# Patient Record
Sex: MALE | Race: WHITE | HISPANIC OR LATINO | URBAN - METROPOLITAN AREA
[De-identification: names, ages, dates, MRNs, and addresses within clinical notes are randomized per-mention and may not be internally consistent; named-entity substitution may affect disease eponyms.]

---

## 2016-07-03 RX ORDER — LABETALOL HCL 100 MG
2 TABLET ORAL
Qty: 180 | Refills: 0 | COMMUNITY
Start: 2016-07-03 | End: 2016-08-02

## 2017-01-10 ENCOUNTER — EMERGENCY (EMERGENCY)
Facility: HOSPITAL | Age: 62
LOS: 0 days | Discharge: ANOTHER HOSPITAL | End: 2017-01-10
Attending: EMERGENCY MEDICINE
Payer: MEDICAID

## 2017-01-10 ENCOUNTER — INPATIENT (INPATIENT)
Facility: HOSPITAL | Age: 62
LOS: 13 days | Discharge: HOME | DRG: 248 | End: 2017-01-24
Attending: STUDENT IN AN ORGANIZED HEALTH CARE EDUCATION/TRAINING PROGRAM | Admitting: STUDENT IN AN ORGANIZED HEALTH CARE EDUCATION/TRAINING PROGRAM
Payer: MEDICAID

## 2017-01-10 VITALS
HEIGHT: 66 IN | OXYGEN SATURATION: 97 % | SYSTOLIC BLOOD PRESSURE: 157 MMHG | RESPIRATION RATE: 18 BRPM | HEART RATE: 84 BPM | DIASTOLIC BLOOD PRESSURE: 96 MMHG | TEMPERATURE: 98 F | WEIGHT: 141.98 LBS

## 2017-01-10 VITALS
RESPIRATION RATE: 16 BRPM | TEMPERATURE: 98 F | HEIGHT: 68 IN | WEIGHT: 138.45 LBS | DIASTOLIC BLOOD PRESSURE: 88 MMHG | SYSTOLIC BLOOD PRESSURE: 156 MMHG | OXYGEN SATURATION: 99 % | HEART RATE: 82 BPM

## 2017-01-10 VITALS
HEART RATE: 78 BPM | OXYGEN SATURATION: 99 % | DIASTOLIC BLOOD PRESSURE: 103 MMHG | SYSTOLIC BLOOD PRESSURE: 171 MMHG | RESPIRATION RATE: 18 BRPM

## 2017-01-10 DIAGNOSIS — I12.0 HYPERTENSIVE CHRONIC KIDNEY DISEASE WITH STAGE 5 CHRONIC KIDNEY DISEASE OR END STAGE RENAL DISEASE: ICD-10-CM

## 2017-01-10 DIAGNOSIS — R11.10 VOMITING, UNSPECIFIED: ICD-10-CM

## 2017-01-10 DIAGNOSIS — Z79.4 LONG TERM (CURRENT) USE OF INSULIN: ICD-10-CM

## 2017-01-10 DIAGNOSIS — N18.6 END STAGE RENAL DISEASE: ICD-10-CM

## 2017-01-10 DIAGNOSIS — I21.3 ST ELEVATION (STEMI) MYOCARDIAL INFARCTION OF UNSPECIFIED SITE: ICD-10-CM

## 2017-01-10 DIAGNOSIS — E11.9 TYPE 2 DIABETES MELLITUS WITHOUT COMPLICATIONS: ICD-10-CM

## 2017-01-10 DIAGNOSIS — R10.9 UNSPECIFIED ABDOMINAL PAIN: ICD-10-CM

## 2017-01-10 DIAGNOSIS — Z99.2 DEPENDENCE ON RENAL DIALYSIS: ICD-10-CM

## 2017-01-10 DIAGNOSIS — E13.10 OTHER SPECIFIED DIABETES MELLITUS WITH KETOACIDOSIS WITHOUT COMA: ICD-10-CM

## 2017-01-10 DIAGNOSIS — N18.9 CHRONIC KIDNEY DISEASE, UNSPECIFIED: ICD-10-CM

## 2017-01-10 LAB
ACETONE SERPL-MCNC: NEGATIVE — SIGNIFICANT CHANGE UP
ALBUMIN SERPL ELPH-MCNC: 3.5 G/DL — SIGNIFICANT CHANGE UP (ref 3.3–5)
ALBUMIN SERPL ELPH-MCNC: 3.7 G/DL — SIGNIFICANT CHANGE UP (ref 3.3–5)
ALBUMIN SERPL ELPH-MCNC: 3.9 G/DL — SIGNIFICANT CHANGE UP (ref 3.3–5)
ALBUMIN SERPL ELPH-MCNC: 3.9 G/DL — SIGNIFICANT CHANGE UP (ref 3.3–5)
ALBUMIN SERPL ELPH-MCNC: 4.1 G/DL — SIGNIFICANT CHANGE UP (ref 3.3–5)
ALBUMIN SERPL ELPH-MCNC: 4.1 G/DL — SIGNIFICANT CHANGE UP (ref 3.3–5)
ALP SERPL-CCNC: 138 U/L — HIGH (ref 40–120)
ALP SERPL-CCNC: 144 U/L — HIGH (ref 40–120)
ALP SERPL-CCNC: 147 U/L — HIGH (ref 40–120)
ALP SERPL-CCNC: 153 U/L — HIGH (ref 40–120)
ALP SERPL-CCNC: 181 U/L — HIGH (ref 40–120)
ALP SERPL-CCNC: 184 U/L — HIGH (ref 40–120)
ALT FLD-CCNC: 127 U/L — HIGH (ref 12–78)
ALT FLD-CCNC: 134 U/L RC — HIGH (ref 10–45)
ALT FLD-CCNC: 138 U/L RC — HIGH (ref 10–45)
ALT FLD-CCNC: 140 U/L RC — HIGH (ref 10–45)
ALT FLD-CCNC: 142 U/L RC — HIGH (ref 10–45)
ALT FLD-CCNC: 147 U/L RC — HIGH (ref 10–45)
AMYLASE P1 CFR SERPL: 56 U/L — SIGNIFICANT CHANGE UP (ref 25–125)
AMYLASE P1 CFR SERPL: 60 U/L — SIGNIFICANT CHANGE UP (ref 25–125)
ANION GAP SERPL CALC-SCNC: 19 MMOL/L — HIGH (ref 5–17)
ANION GAP SERPL CALC-SCNC: 20 MMOL/L — HIGH (ref 5–17)
ANION GAP SERPL CALC-SCNC: 20 MMOL/L — HIGH (ref 5–17)
ANION GAP SERPL CALC-SCNC: 22 MMOL/L — HIGH (ref 5–17)
ANION GAP SERPL CALC-SCNC: 27 MMOL/L — HIGH (ref 5–17)
ANION GAP SERPL CALC-SCNC: 34 MMOL/L — HIGH (ref 5–17)
APTT BLD: 119.3 SEC — HIGH (ref 27.5–37.4)
APTT BLD: 28.1 SEC — SIGNIFICANT CHANGE UP (ref 27.5–37.4)
AST SERPL-CCNC: 172 U/L — HIGH (ref 15–37)
AST SERPL-CCNC: 232 U/L — HIGH (ref 10–40)
AST SERPL-CCNC: 404 U/L — HIGH (ref 10–40)
AST SERPL-CCNC: 413 U/L — HIGH (ref 10–40)
AST SERPL-CCNC: 447 U/L — HIGH (ref 10–40)
AST SERPL-CCNC: 475 U/L — HIGH (ref 10–40)
B-OH-BUTYR SERPL-SCNC: 6.6 MMOL/L — HIGH
BASOPHILS # BLD AUTO: 0 K/UL — SIGNIFICANT CHANGE UP (ref 0–0.2)
BASOPHILS # BLD AUTO: 0 K/UL — SIGNIFICANT CHANGE UP (ref 0–0.2)
BASOPHILS NFR BLD AUTO: 0.1 % — SIGNIFICANT CHANGE UP (ref 0–2)
BASOPHILS NFR BLD AUTO: 0.3 % — SIGNIFICANT CHANGE UP (ref 0–2)
BILIRUB SERPL-MCNC: 0.3 MG/DL — SIGNIFICANT CHANGE UP (ref 0.2–1.2)
BILIRUB SERPL-MCNC: 0.3 MG/DL — SIGNIFICANT CHANGE UP (ref 0.2–1.2)
BILIRUB SERPL-MCNC: 0.4 MG/DL — SIGNIFICANT CHANGE UP (ref 0.2–1.2)
BILIRUB SERPL-MCNC: 0.6 MG/DL — SIGNIFICANT CHANGE UP (ref 0.2–1.2)
BLD GP AB SCN SERPL QL: NEGATIVE — SIGNIFICANT CHANGE UP
BUN SERPL-MCNC: 28 MG/DL — HIGH (ref 7–23)
BUN SERPL-MCNC: 51 MG/DL — HIGH (ref 7–23)
BUN SERPL-MCNC: 54 MG/DL — HIGH (ref 7–23)
BUN SERPL-MCNC: 58 MG/DL — HIGH (ref 7–23)
BUN SERPL-MCNC: 60 MG/DL — HIGH (ref 7–23)
BUN SERPL-MCNC: 60 MG/DL — HIGH (ref 7–23)
CALCIUM SERPL-MCNC: 8.1 MG/DL — LOW (ref 8.5–10.1)
CALCIUM SERPL-MCNC: 8.8 MG/DL — SIGNIFICANT CHANGE UP (ref 8.4–10.5)
CALCIUM SERPL-MCNC: 8.8 MG/DL — SIGNIFICANT CHANGE UP (ref 8.4–10.5)
CALCIUM SERPL-MCNC: 8.9 MG/DL — SIGNIFICANT CHANGE UP (ref 8.4–10.5)
CALCIUM SERPL-MCNC: 8.9 MG/DL — SIGNIFICANT CHANGE UP (ref 8.4–10.5)
CALCIUM SERPL-MCNC: 9.1 MG/DL — SIGNIFICANT CHANGE UP (ref 8.4–10.5)
CHLORIDE SERPL-SCNC: 81 MMOL/L — LOW (ref 96–108)
CHLORIDE SERPL-SCNC: 83 MMOL/L — LOW (ref 96–108)
CHLORIDE SERPL-SCNC: 90 MMOL/L — LOW (ref 96–108)
CHLORIDE SERPL-SCNC: 91 MMOL/L — LOW (ref 96–108)
CHLORIDE SERPL-SCNC: 93 MMOL/L — LOW (ref 96–108)
CHLORIDE SERPL-SCNC: 97 MMOL/L — SIGNIFICANT CHANGE UP (ref 96–108)
CHOLEST SERPL-MCNC: 166 MG/DL — SIGNIFICANT CHANGE UP (ref 10–199)
CK MB BLD-MCNC: 5.8 % — HIGH (ref 0–3.5)
CK MB BLD-MCNC: 6.6 % — HIGH (ref 0–3.5)
CK MB BLD-MCNC: 6.8 % — HIGH (ref 0–3.5)
CK MB BLD-MCNC: 7.1 % — HIGH (ref 0–3.5)
CK MB CFR SERPL CALC: 129.6 NG/ML — HIGH (ref 0–6.7)
CK MB CFR SERPL CALC: 165.5 NG/ML — HIGH (ref 0–6.7)
CK MB CFR SERPL CALC: 18.4 NG/ML — HIGH (ref 0.5–3.6)
CK MB CFR SERPL CALC: 48.3 NG/ML — HIGH (ref 0–6.7)
CK SERPL-CCNC: 1961 U/L — HIGH (ref 30–200)
CK SERPL-CCNC: 2426 U/L — HIGH (ref 30–200)
CK SERPL-CCNC: 315 U/L — HIGH (ref 26–308)
CK SERPL-CCNC: 684 U/L — HIGH (ref 30–200)
CO2 SERPL-SCNC: 17 MMOL/L — LOW (ref 22–31)
CO2 SERPL-SCNC: 21 MMOL/L — LOW (ref 22–31)
CO2 SERPL-SCNC: 25 MMOL/L — SIGNIFICANT CHANGE UP (ref 22–31)
CO2 SERPL-SCNC: 25 MMOL/L — SIGNIFICANT CHANGE UP (ref 22–31)
CO2 SERPL-SCNC: 26 MMOL/L — SIGNIFICANT CHANGE UP (ref 22–31)
CO2 SERPL-SCNC: 26 MMOL/L — SIGNIFICANT CHANGE UP (ref 22–31)
CREAT SERPL-MCNC: 3.8 MG/DL — HIGH (ref 0.5–1.3)
CREAT SERPL-MCNC: 5.68 MG/DL — HIGH (ref 0.5–1.3)
CREAT SERPL-MCNC: 5.86 MG/DL — HIGH (ref 0.5–1.3)
CREAT SERPL-MCNC: 6.07 MG/DL — HIGH (ref 0.5–1.3)
CREAT SERPL-MCNC: 6.32 MG/DL — HIGH (ref 0.5–1.3)
CREAT SERPL-MCNC: 6.43 MG/DL — HIGH (ref 0.5–1.3)
EOSINOPHIL # BLD AUTO: 0 K/UL — SIGNIFICANT CHANGE UP (ref 0–0.5)
EOSINOPHIL # BLD AUTO: 0 K/UL — SIGNIFICANT CHANGE UP (ref 0–0.5)
EOSINOPHIL NFR BLD AUTO: 0.3 % — SIGNIFICANT CHANGE UP (ref 0–6)
EOSINOPHIL NFR BLD AUTO: 0.9 % — SIGNIFICANT CHANGE UP (ref 0–6)
GAS PNL BLDA: SIGNIFICANT CHANGE UP
GLUCOSE SERPL-MCNC: 103 MG/DL — HIGH (ref 70–99)
GLUCOSE SERPL-MCNC: 109 MG/DL — HIGH (ref 70–99)
GLUCOSE SERPL-MCNC: 183 MG/DL — HIGH (ref 70–99)
GLUCOSE SERPL-MCNC: 372 MG/DL — HIGH (ref 70–99)
GLUCOSE SERPL-MCNC: 871 MG/DL — CRITICAL HIGH (ref 70–99)
GLUCOSE SERPL-MCNC: 909 MG/DL — CRITICAL HIGH (ref 70–99)
HAV IGM SER-ACNC: SIGNIFICANT CHANGE UP
HBA1C BLD-MCNC: 12.2 % — HIGH (ref 4–5.6)
HBV CORE IGM SER-ACNC: SIGNIFICANT CHANGE UP
HBV SURFACE AG SER-ACNC: SIGNIFICANT CHANGE UP
HCOV OC43 RNA SPEC QL NAA+PROBE: DETECTED
HCT VFR BLD CALC: 35.1 % — LOW (ref 39–50)
HCT VFR BLD CALC: 36.2 % — LOW (ref 39–50)
HCV AB S/CO SERPL IA: 0.11 S/CO — SIGNIFICANT CHANGE UP
HCV AB SERPL-IMP: SIGNIFICANT CHANGE UP
HDLC SERPL-MCNC: 62 MG/DL — SIGNIFICANT CHANGE UP (ref 40–125)
HGB BLD-MCNC: 12 G/DL — LOW (ref 13–17)
HGB BLD-MCNC: 12.1 G/DL — LOW (ref 13–17)
INR BLD: 0.92 RATIO — SIGNIFICANT CHANGE UP (ref 0.88–1.16)
INR BLD: 1 RATIO — SIGNIFICANT CHANGE UP (ref 0.88–1.16)
LACTATE SERPL-SCNC: 3.8 MMOL/L — HIGH (ref 0.7–2)
LIDOCAIN IGE QN: 28 U/L — SIGNIFICANT CHANGE UP (ref 7–60)
LIPID PNL WITH DIRECT LDL SERPL: 78 MG/DL — SIGNIFICANT CHANGE UP
LYMPHOCYTES # BLD AUTO: 0.4 K/UL — LOW (ref 1–3.3)
LYMPHOCYTES # BLD AUTO: 0.4 K/UL — LOW (ref 1–3.3)
LYMPHOCYTES # BLD AUTO: 6.3 % — LOW (ref 13–44)
LYMPHOCYTES # BLD AUTO: 7.6 % — LOW (ref 13–44)
MAGNESIUM SERPL-MCNC: 2.1 MG/DL — SIGNIFICANT CHANGE UP (ref 1.6–2.6)
MAGNESIUM SERPL-MCNC: 2.4 MG/DL — SIGNIFICANT CHANGE UP (ref 1.6–2.6)
MCHC RBC-ENTMCNC: 31.7 PG — SIGNIFICANT CHANGE UP (ref 27–34)
MCHC RBC-ENTMCNC: 32.4 PG — SIGNIFICANT CHANGE UP (ref 27–34)
MCHC RBC-ENTMCNC: 33.6 GM/DL — SIGNIFICANT CHANGE UP (ref 32–36)
MCHC RBC-ENTMCNC: 34.2 GM/DL — SIGNIFICANT CHANGE UP (ref 32–36)
MCV RBC AUTO: 92.8 FL — SIGNIFICANT CHANGE UP (ref 80–100)
MCV RBC AUTO: 96.3 FL — SIGNIFICANT CHANGE UP (ref 80–100)
MONOCYTES # BLD AUTO: 0.4 K/UL — SIGNIFICANT CHANGE UP (ref 0–0.9)
MONOCYTES # BLD AUTO: 0.4 K/UL — SIGNIFICANT CHANGE UP (ref 0–0.9)
MONOCYTES NFR BLD AUTO: 5.8 % — SIGNIFICANT CHANGE UP (ref 2–14)
MONOCYTES NFR BLD AUTO: 8 % — SIGNIFICANT CHANGE UP (ref 2–14)
NEUTROPHILS # BLD AUTO: 4.3 K/UL — SIGNIFICANT CHANGE UP (ref 1.8–7.4)
NEUTROPHILS # BLD AUTO: 5.6 K/UL — SIGNIFICANT CHANGE UP (ref 1.8–7.4)
NEUTROPHILS NFR BLD AUTO: 83.3 % — HIGH (ref 43–77)
NEUTROPHILS NFR BLD AUTO: 87.3 % — HIGH (ref 43–77)
PHOSPHATE SERPL-MCNC: 6.7 MG/DL — HIGH (ref 2.5–4.5)
PLATELET # BLD AUTO: 94 K/UL — LOW (ref 150–400)
PLATELET # BLD AUTO: 99 K/UL — LOW (ref 150–400)
POTASSIUM SERPL-MCNC: 3.5 MMOL/L — SIGNIFICANT CHANGE UP (ref 3.5–5.3)
POTASSIUM SERPL-MCNC: 3.7 MMOL/L — SIGNIFICANT CHANGE UP (ref 3.5–5.3)
POTASSIUM SERPL-MCNC: 4 MMOL/L — SIGNIFICANT CHANGE UP (ref 3.5–5.3)
POTASSIUM SERPL-MCNC: 4.1 MMOL/L — SIGNIFICANT CHANGE UP (ref 3.5–5.3)
POTASSIUM SERPL-MCNC: 5 MMOL/L — SIGNIFICANT CHANGE UP (ref 3.5–5.3)
POTASSIUM SERPL-MCNC: 5.2 MMOL/L — SIGNIFICANT CHANGE UP (ref 3.5–5.3)
POTASSIUM SERPL-SCNC: 3.5 MMOL/L — SIGNIFICANT CHANGE UP (ref 3.5–5.3)
POTASSIUM SERPL-SCNC: 3.7 MMOL/L — SIGNIFICANT CHANGE UP (ref 3.5–5.3)
POTASSIUM SERPL-SCNC: 4 MMOL/L — SIGNIFICANT CHANGE UP (ref 3.5–5.3)
POTASSIUM SERPL-SCNC: 4.1 MMOL/L — SIGNIFICANT CHANGE UP (ref 3.5–5.3)
POTASSIUM SERPL-SCNC: 5 MMOL/L — SIGNIFICANT CHANGE UP (ref 3.5–5.3)
POTASSIUM SERPL-SCNC: 5.2 MMOL/L — SIGNIFICANT CHANGE UP (ref 3.5–5.3)
PROT SERPL-MCNC: 6.5 G/DL — SIGNIFICANT CHANGE UP (ref 6–8.3)
PROT SERPL-MCNC: 6.5 G/DL — SIGNIFICANT CHANGE UP (ref 6–8.3)
PROT SERPL-MCNC: 6.9 G/DL — SIGNIFICANT CHANGE UP (ref 6–8.3)
PROT SERPL-MCNC: 7.2 GM/DL — SIGNIFICANT CHANGE UP (ref 6–8.3)
PROT SERPL-MCNC: 7.3 G/DL — SIGNIFICANT CHANGE UP (ref 6–8.3)
PROT SERPL-MCNC: 7.4 G/DL — SIGNIFICANT CHANGE UP (ref 6–8.3)
PROTHROM AB SERPL-ACNC: 10.3 SEC — SIGNIFICANT CHANGE UP (ref 10–13.1)
PROTHROM AB SERPL-ACNC: 10.8 SEC — SIGNIFICANT CHANGE UP (ref 10–13.1)
RAPID RVP RESULT: DETECTED
RBC # BLD: 3.75 M/UL — LOW (ref 4.2–5.8)
RBC # BLD: 3.78 M/UL — LOW (ref 4.2–5.8)
RBC # FLD: 12 % — SIGNIFICANT CHANGE UP (ref 11–15)
RBC # FLD: 12.2 % — SIGNIFICANT CHANGE UP (ref 10.3–14.5)
RH IG SCN BLD-IMP: POSITIVE — SIGNIFICANT CHANGE UP
SODIUM SERPL-SCNC: 131 MMOL/L — LOW (ref 135–145)
SODIUM SERPL-SCNC: 132 MMOL/L — LOW (ref 135–145)
SODIUM SERPL-SCNC: 136 MMOL/L — SIGNIFICANT CHANGE UP (ref 135–145)
SODIUM SERPL-SCNC: 138 MMOL/L — SIGNIFICANT CHANGE UP (ref 135–145)
SODIUM SERPL-SCNC: 139 MMOL/L — SIGNIFICANT CHANGE UP (ref 135–145)
SODIUM SERPL-SCNC: 141 MMOL/L — SIGNIFICANT CHANGE UP (ref 135–145)
TOTAL CHOLESTEROL/HDL RATIO MEASUREMENT: 2.7 RATIO — LOW (ref 3.4–9.6)
TRIGL SERPL-MCNC: 128 MG/DL — SIGNIFICANT CHANGE UP (ref 10–149)
TROPONIN I SERPL-MCNC: 9.99 NG/ML — HIGH (ref 0.01–0.04)
TROPONIN T SERPL-MCNC: 1.66 NG/ML — HIGH (ref 0–0.06)
TROPONIN T SERPL-MCNC: 17.62 NG/ML — HIGH (ref 0–0.06)
TROPONIN T SERPL-MCNC: 22.19 NG/ML — HIGH (ref 0–0.06)
TSH SERPL-MCNC: 4.23 UU/ML — HIGH (ref 0.27–4.2)
WBC # BLD: 5.1 K/UL — SIGNIFICANT CHANGE UP (ref 3.8–10.5)
WBC # BLD: 6.4 K/UL — SIGNIFICANT CHANGE UP (ref 3.8–10.5)
WBC # FLD AUTO: 5.1 K/UL — SIGNIFICANT CHANGE UP (ref 3.8–10.5)
WBC # FLD AUTO: 6.4 K/UL — SIGNIFICANT CHANGE UP (ref 3.8–10.5)

## 2017-01-10 PROCEDURE — 93010 ELECTROCARDIOGRAM REPORT: CPT | Mod: 77,76

## 2017-01-10 PROCEDURE — 93454 CORONARY ARTERY ANGIO S&I: CPT | Mod: 26,59

## 2017-01-10 PROCEDURE — 92928 PRQ TCAT PLMT NTRAC ST 1 LES: CPT | Mod: LD

## 2017-01-10 PROCEDURE — 93010 ELECTROCARDIOGRAM REPORT: CPT

## 2017-01-10 PROCEDURE — 76705 ECHO EXAM OF ABDOMEN: CPT | Mod: 26,RT

## 2017-01-10 PROCEDURE — 99223 1ST HOSP IP/OBS HIGH 75: CPT | Mod: GC

## 2017-01-10 PROCEDURE — 93306 TTE W/DOPPLER COMPLETE: CPT | Mod: 26

## 2017-01-10 PROCEDURE — 71010: CPT | Mod: 26

## 2017-01-10 PROCEDURE — 99291 CRITICAL CARE FIRST HOUR: CPT

## 2017-01-10 PROCEDURE — 93010 ELECTROCARDIOGRAM REPORT: CPT | Mod: 77

## 2017-01-10 RX ORDER — INSULIN HUMAN 100 [IU]/ML
9 INJECTION, SOLUTION SUBCUTANEOUS
Qty: 100 | Refills: 0 | Status: DISCONTINUED | OUTPATIENT
Start: 2017-01-10 | End: 2017-01-10

## 2017-01-10 RX ORDER — HEPARIN SODIUM 5000 [USP'U]/ML
3800 INJECTION INTRAVENOUS; SUBCUTANEOUS EVERY 6 HOURS
Qty: 0 | Refills: 0 | Status: DISCONTINUED | OUTPATIENT
Start: 2017-01-10 | End: 2017-01-10

## 2017-01-10 RX ORDER — TICAGRELOR 90 MG/1
180 TABLET ORAL ONCE
Qty: 0 | Refills: 0 | Status: COMPLETED | OUTPATIENT
Start: 2017-01-10 | End: 2017-01-10

## 2017-01-10 RX ORDER — PANTOPRAZOLE SODIUM 20 MG/1
80 TABLET, DELAYED RELEASE ORAL ONCE
Qty: 0 | Refills: 0 | Status: COMPLETED | OUTPATIENT
Start: 2017-01-10 | End: 2017-01-10

## 2017-01-10 RX ORDER — HEPARIN SODIUM 5000 [USP'U]/ML
INJECTION INTRAVENOUS; SUBCUTANEOUS
Qty: 25000 | Refills: 0 | Status: DISCONTINUED | OUTPATIENT
Start: 2017-01-10 | End: 2017-01-10

## 2017-01-10 RX ORDER — PANTOPRAZOLE SODIUM 20 MG/1
8 TABLET, DELAYED RELEASE ORAL
Qty: 80 | Refills: 0 | Status: DISCONTINUED | OUTPATIENT
Start: 2017-01-10 | End: 2017-01-10

## 2017-01-10 RX ORDER — HEPARIN SODIUM 5000 [USP'U]/ML
3800 INJECTION INTRAVENOUS; SUBCUTANEOUS ONCE
Qty: 0 | Refills: 0 | Status: COMPLETED | OUTPATIENT
Start: 2017-01-10 | End: 2017-01-10

## 2017-01-10 RX ORDER — TICAGRELOR 90 MG/1
90 TABLET ORAL
Qty: 0 | Refills: 0 | Status: DISCONTINUED | OUTPATIENT
Start: 2017-01-11 | End: 2017-01-13

## 2017-01-10 RX ORDER — INSULIN HUMAN 100 [IU]/ML
7 INJECTION, SOLUTION SUBCUTANEOUS
Qty: 100 | Refills: 0 | Status: DISCONTINUED | OUTPATIENT
Start: 2017-01-10 | End: 2017-01-10

## 2017-01-10 RX ORDER — HEPARIN SODIUM 5000 [USP'U]/ML
5000 INJECTION INTRAVENOUS; SUBCUTANEOUS EVERY 12 HOURS
Qty: 0 | Refills: 0 | Status: DISCONTINUED | OUTPATIENT
Start: 2017-01-10 | End: 2017-01-10

## 2017-01-10 RX ORDER — SODIUM CHLORIDE 9 MG/ML
250 INJECTION INTRAMUSCULAR; INTRAVENOUS; SUBCUTANEOUS ONCE
Qty: 0 | Refills: 0 | Status: COMPLETED | OUTPATIENT
Start: 2017-01-10 | End: 2017-01-10

## 2017-01-10 RX ORDER — INSULIN HUMAN 100 [IU]/ML
7 INJECTION, SOLUTION SUBCUTANEOUS ONCE
Qty: 0 | Refills: 0 | Status: COMPLETED | OUTPATIENT
Start: 2017-01-10 | End: 2017-01-10

## 2017-01-10 RX ORDER — ONDANSETRON 8 MG/1
8 TABLET, FILM COATED ORAL ONCE
Qty: 0 | Refills: 0 | Status: COMPLETED | OUTPATIENT
Start: 2017-01-10 | End: 2017-01-10

## 2017-01-10 RX ORDER — CLOPIDOGREL BISULFATE 75 MG/1
300 TABLET, FILM COATED ORAL ONCE
Qty: 0 | Refills: 0 | Status: COMPLETED | OUTPATIENT
Start: 2017-01-10 | End: 2017-01-10

## 2017-01-10 RX ORDER — ASPIRIN/CALCIUM CARB/MAGNESIUM 324 MG
81 TABLET ORAL DAILY
Qty: 0 | Refills: 0 | Status: DISCONTINUED | OUTPATIENT
Start: 2017-01-10 | End: 2017-01-24

## 2017-01-10 RX ORDER — INSULIN HUMAN 100 [IU]/ML
6 INJECTION, SOLUTION SUBCUTANEOUS
Qty: 100 | Refills: 0 | Status: DISCONTINUED | OUTPATIENT
Start: 2017-01-10 | End: 2017-01-10

## 2017-01-10 RX ORDER — PANTOPRAZOLE SODIUM 20 MG/1
40 TABLET, DELAYED RELEASE ORAL ONCE
Qty: 0 | Refills: 0 | Status: COMPLETED | OUTPATIENT
Start: 2017-01-10 | End: 2017-01-10

## 2017-01-10 RX ORDER — CLOPIDOGREL BISULFATE 75 MG/1
75 TABLET, FILM COATED ORAL DAILY
Qty: 0 | Refills: 0 | Status: DISCONTINUED | OUTPATIENT
Start: 2017-01-10 | End: 2017-01-10

## 2017-01-10 RX ORDER — SODIUM CHLORIDE 9 MG/ML
1000 INJECTION INTRAMUSCULAR; INTRAVENOUS; SUBCUTANEOUS ONCE
Qty: 0 | Refills: 0 | Status: COMPLETED | OUTPATIENT
Start: 2017-01-10 | End: 2017-01-10

## 2017-01-10 RX ORDER — HEPARIN SODIUM 5000 [USP'U]/ML
550 INJECTION INTRAVENOUS; SUBCUTANEOUS
Qty: 25000 | Refills: 0 | Status: DISCONTINUED | OUTPATIENT
Start: 2017-01-10 | End: 2017-01-10

## 2017-01-10 RX ORDER — DEXTROSE 50 % IN WATER 50 %
50 SYRINGE (ML) INTRAVENOUS ONCE
Qty: 0 | Refills: 0 | Status: COMPLETED | OUTPATIENT
Start: 2017-01-10 | End: 2017-01-10

## 2017-01-10 RX ORDER — INSULIN HUMAN 100 [IU]/ML
9 INJECTION, SOLUTION SUBCUTANEOUS ONCE
Qty: 0 | Refills: 0 | Status: COMPLETED | OUTPATIENT
Start: 2017-01-10 | End: 2017-01-10

## 2017-01-10 RX ORDER — HYDRALAZINE HCL 50 MG
10 TABLET ORAL ONCE
Qty: 0 | Refills: 0 | Status: COMPLETED | OUTPATIENT
Start: 2017-01-10 | End: 2017-01-10

## 2017-01-10 RX ORDER — ASPIRIN/CALCIUM CARB/MAGNESIUM 324 MG
325 TABLET ORAL ONCE
Qty: 0 | Refills: 0 | Status: COMPLETED | OUTPATIENT
Start: 2017-01-10 | End: 2017-01-10

## 2017-01-10 RX ORDER — ATORVASTATIN CALCIUM 80 MG/1
80 TABLET, FILM COATED ORAL AT BEDTIME
Qty: 0 | Refills: 0 | Status: DISCONTINUED | OUTPATIENT
Start: 2017-01-10 | End: 2017-01-10

## 2017-01-10 RX ORDER — SODIUM CHLORIDE 9 MG/ML
1000 INJECTION, SOLUTION INTRAVENOUS
Qty: 0 | Refills: 0 | Status: DISCONTINUED | OUTPATIENT
Start: 2017-01-10 | End: 2017-01-10

## 2017-01-10 RX ADMIN — SODIUM CHLORIDE 250 MILLILITER(S): 9 INJECTION INTRAMUSCULAR; INTRAVENOUS; SUBCUTANEOUS at 06:00

## 2017-01-10 RX ADMIN — Medication 10 MILLIGRAM(S): at 07:33

## 2017-01-10 RX ADMIN — SODIUM CHLORIDE 75 MILLILITER(S): 9 INJECTION, SOLUTION INTRAVENOUS at 13:54

## 2017-01-10 RX ADMIN — PANTOPRAZOLE SODIUM 40 MILLIGRAM(S): 20 TABLET, DELAYED RELEASE ORAL at 09:23

## 2017-01-10 RX ADMIN — HEPARIN SODIUM 3800 UNIT(S): 5000 INJECTION INTRAVENOUS; SUBCUTANEOUS at 03:21

## 2017-01-10 RX ADMIN — SODIUM CHLORIDE 2000 MILLILITER(S): 9 INJECTION INTRAMUSCULAR; INTRAVENOUS; SUBCUTANEOUS at 02:56

## 2017-01-10 RX ADMIN — INSULIN HUMAN 9 UNIT(S): 100 INJECTION, SOLUTION SUBCUTANEOUS at 05:45

## 2017-01-10 RX ADMIN — ONDANSETRON 8 MILLIGRAM(S): 8 TABLET, FILM COATED ORAL at 02:44

## 2017-01-10 RX ADMIN — TICAGRELOR 180 MILLIGRAM(S): 90 TABLET ORAL at 09:53

## 2017-01-10 RX ADMIN — PANTOPRAZOLE SODIUM 10 MG/HR: 20 TABLET, DELAYED RELEASE ORAL at 03:13

## 2017-01-10 RX ADMIN — INSULIN HUMAN 9 UNIT(S)/HR: 100 INJECTION, SOLUTION SUBCUTANEOUS at 05:50

## 2017-01-10 RX ADMIN — CLOPIDOGREL BISULFATE 300 MILLIGRAM(S): 75 TABLET, FILM COATED ORAL at 03:12

## 2017-01-10 RX ADMIN — PANTOPRAZOLE SODIUM 80 MILLIGRAM(S): 20 TABLET, DELAYED RELEASE ORAL at 03:13

## 2017-01-10 RX ADMIN — Medication 325 MILLIGRAM(S): at 02:44

## 2017-01-10 RX ADMIN — HEPARIN SODIUM 550 UNIT(S)/HR: 5000 INJECTION INTRAVENOUS; SUBCUTANEOUS at 05:59

## 2017-01-10 RX ADMIN — INSULIN HUMAN 7 UNIT(S): 100 INJECTION, SOLUTION SUBCUTANEOUS at 03:20

## 2017-01-10 RX ADMIN — Medication 50 MILLILITER(S): at 15:14

## 2017-01-10 RX ADMIN — CLOPIDOGREL BISULFATE 300 MILLIGRAM(S): 75 TABLET, FILM COATED ORAL at 02:43

## 2017-01-10 NOTE — H&P ADULT. - HISTORY OF PRESENT ILLNESS
61 M pmh HTN, DM2, ESRD on HD m/w/f (last HD last night) presents as a transfer from  due to STEMI.  Pt has had "flu-like" symptoms - n/v, anorexia, fever/chills, diaphoresis for past 3 days and went to ED at  last night.  Pt denied chest pain.  Pt was found to have TOBIAS in inferior leads with reciprocal changes.  OSH found pt's blood sugar to be 909.   gave started asa/plavix/hep gtt and protonix gtt.    On arrival patient appears uncomfortable.  Pt states chest pain in xyphoid region with associated shortness of breath, nausea.  Pt denies abdominal pain, palpitations.  Pt is nonsmoker, with previous alcohol use.  Pt lives with wife and family. 61 M pmh HTN, DM2, ESRD on HD m/w/f (last HD last night) presents as a transfer from  due to STEMI.  Pt has had "flu-like" symptoms - n/v, anorexia, fever/chills, diaphoresis, shortness of breath for past 3 days and went to ED at  last night.  Pt denied chest pain.  Pt was found to have TOBIAS in inferior leads with reciprocal changes.   found pt's blood sugar to be 909.   gave started asa/plavix/hep gtt and protonix gtt.    On arrival patient appears uncomfortable.  Pt states chest pain in xyphoid region with associated shortness of breath, nausea.  Pt denies abdominal pain, palpitations.  Pt is nonsmoker, with previous alcohol use.  Pt lives with wife and family.    Pt had prior cath showing: Moderate to severe prox LAD disease. Multiple small  vessels with severe disease - too small for PCI (OM1, OM2, D1, D2) 61 M pmh HTN, DM2, ESRD on HD m/w/f (last HD last night) presents as a transfer from  due to STEMI.  Pt has had "flu-like" symptoms - n/v, anorexia, fever/chills, diaphoresis, shortness of breath for past 3 days and went to ED at  last night.  Pt denied chest pain, butwas found to have TOBIAS in inferior leads with reciprocal changes.   found pt's blood sugar to be 909.   gave started asa/plavix/hep gtt and protonix gtt.    On arrival patient appears uncomfortable.  Pt states chest pain in xyphoid region with associated shortness of breath, nausea.  Pt denies abdominal pain, palpitations.  Pt is nonsmoker, with previous alcohol use.  Pt lives with wife and family.    Pt had prior cath (6/2016) showing: moderate to severe prox LAD disease. Multiple small  vessels with severe disease - too small for PCI (OM1, OM2, D1, D2) 61 M pmh HTN, DM2, ESRD on HD m/w/f (last HD last night) presents as a transfer from  due to STEMI.  Pt has had "flu-like" symptoms - n/v, anorexia, fever/chills, diaphoresis for past 3 days and went to ED at  last night.  Pt denied chest pain, shortness of breath, but was found to have TOBIAS in inferior leads with reciprocal changes.   found pt's blood sugar to be 909.   gave started asa/plavix/hep gtt and protonix gtt.    On arrival patient appears uncomfortable and lethargic.  Pt states chest/abd pain in epigastric region with associated nausea.  Pt denies palpitations, chest pain, shortness of breath, HA.  Pt is nonsmoker, with previous alcohol use.  Pt lives with wife and family.    Pt had prior cath (6/2016) showing: moderate to severe prox LAD disease. Multiple small  vessels with severe disease - too small for PCI (OM1, OM2, D1, D2)

## 2017-01-10 NOTE — ED PROVIDER NOTE - MEDICAL DECISION MAKING DETAILS
Pt w STEMI, given ASA, plavix, heparin.  Transfer arranged, pt going to CCU bed 3, Dr. Mcdonough accepting. Pt w STEMI, given ASA, plavix, heparin.  Given insulin for DKA.  Transfer arranged, pt going to CCU bed 3, Dr. Mcdonough accepting.

## 2017-01-10 NOTE — ED PROVIDER NOTE - CRITICAL CARE PROVIDED
interpretation of diagnostic studies/direct patient care (not related to procedure)/consult w/ pt's family directly relating to pts condition/additional history taking/consultation with other physicians/documentation

## 2017-01-10 NOTE — ED PROVIDER NOTE - OBJECTIVE STATEMENT
Pertinent PMH/PSH/FHx/SHx and Review of Systems contained within:    60yo M w PMH of HTN, HL, ESRD on HD presents to ED c/o vomiting.  Pt states he has not felt well,   for about 2d, then after dialysis at 3pm had mult episodes of vomiting.  P Pertinent PMH/PSH/FHx/SHx and Review of Systems contained within:    60yo M w PMH of HTN, HL, ESRD on HD presents to ED c/o vomiting.  Pt states he has not felt well, for about 2d, then after dialysis at 3pm had mult episodes of vomiting.  Denies fever, chills, CP, SOB, ingestion of contaminated foods.    No fever/chills, No photophobia/eye pain/changes in vision, No ear pain/sore throat/dysphagia, No chest pain/palpitations, no SOB/cough/wheeze/stridor, no dysuria/frequency/discharge, No neck/back pain, no rash, no changes in neurological status/function.

## 2017-01-10 NOTE — ED PROVIDER NOTE - PHYSICAL EXAMINATION
Gen: Awake, c/o discomfort;  Head: NC, AT, EOMI, normal lids/conjunctiva;  ENT: normal hearing, patent oropharynx, MMM;  Neck: supple, no tenderness/meningismus, FROM;  Pulm: Bilateral clear BS, normal resp effort, no wheeze/stridor/retractions;  CV: RRR, no M/R/G, +dist pulses;  Abd: soft, no focal TTP, ND, +BS, no guarding/rebound tenderness;  Mskel: no edema/erythema/cyanosis;  Skin: no rash;  Neuro: AAOx3, no sensory/motor deficits

## 2017-01-10 NOTE — H&P ADULT. - RS GEN PE MLT RESP DETAILS PC
no rhonchi/airway patent/no chest wall tenderness/no wheezes/rales/good air movement/breath sounds equal

## 2017-01-10 NOTE — H&P ADULT. - ASSESSMENT
61 M pmh HTN, DM2, ESRD on HD m/w/f presents as a transfer from  due to STEMI.  Pt also hyperglycemic.

## 2017-01-10 NOTE — ED PROVIDER NOTE - CARE PLAN
Principal Discharge DX:	STEMI (ST elevation myocardial infarction) Principal Discharge DX:	STEMI (ST elevation myocardial infarction)  Secondary Diagnosis:	DKA (diabetic ketoacidoses)

## 2017-01-11 LAB
ACETONE SERPL-MCNC: SIGNIFICANT CHANGE UP
ALBUMIN SERPL ELPH-MCNC: 3.8 G/DL — SIGNIFICANT CHANGE UP (ref 3.3–5)
ALBUMIN SERPL ELPH-MCNC: 3.9 G/DL — SIGNIFICANT CHANGE UP (ref 3.3–5)
ALBUMIN SERPL ELPH-MCNC: 4 G/DL — SIGNIFICANT CHANGE UP (ref 3.3–5)
ALP SERPL-CCNC: 131 U/L — HIGH (ref 40–120)
ALP SERPL-CCNC: 135 U/L — HIGH (ref 40–120)
ALP SERPL-CCNC: 138 U/L — HIGH (ref 40–120)
ALT FLD-CCNC: 125 U/L RC — HIGH (ref 10–45)
ALT FLD-CCNC: 128 U/L RC — HIGH (ref 10–45)
ALT FLD-CCNC: 133 U/L RC — HIGH (ref 10–45)
AMYLASE P1 CFR SERPL: 57 U/L — SIGNIFICANT CHANGE UP (ref 25–125)
ANION GAP SERPL CALC-SCNC: 17 MMOL/L — SIGNIFICANT CHANGE UP (ref 5–17)
ANION GAP SERPL CALC-SCNC: 17 MMOL/L — SIGNIFICANT CHANGE UP (ref 5–17)
ANION GAP SERPL CALC-SCNC: 21 MMOL/L — HIGH (ref 5–17)
APTT BLD: 26.1 SEC — LOW (ref 27.5–37.4)
AST SERPL-CCNC: 215 U/L — HIGH (ref 10–40)
AST SERPL-CCNC: 282 U/L — HIGH (ref 10–40)
AST SERPL-CCNC: 323 U/L — HIGH (ref 10–40)
BASOPHILS # BLD AUTO: 0 K/UL — SIGNIFICANT CHANGE UP (ref 0–0.2)
BASOPHILS # BLD AUTO: 0 K/UL — SIGNIFICANT CHANGE UP (ref 0–0.2)
BASOPHILS NFR BLD AUTO: 0 % — SIGNIFICANT CHANGE UP (ref 0–2)
BASOPHILS NFR BLD AUTO: 0.1 % — SIGNIFICANT CHANGE UP (ref 0–2)
BILIRUB SERPL-MCNC: 0.3 MG/DL — SIGNIFICANT CHANGE UP (ref 0.2–1.2)
BILIRUB SERPL-MCNC: 0.4 MG/DL — SIGNIFICANT CHANGE UP (ref 0.2–1.2)
BILIRUB SERPL-MCNC: 0.4 MG/DL — SIGNIFICANT CHANGE UP (ref 0.2–1.2)
BUN SERPL-MCNC: 33 MG/DL — HIGH (ref 7–23)
BUN SERPL-MCNC: 37 MG/DL — HIGH (ref 7–23)
BUN SERPL-MCNC: 43 MG/DL — HIGH (ref 7–23)
CALCIUM SERPL-MCNC: 9 MG/DL — SIGNIFICANT CHANGE UP (ref 8.4–10.5)
CALCIUM SERPL-MCNC: 9.3 MG/DL — SIGNIFICANT CHANGE UP (ref 8.4–10.5)
CALCIUM SERPL-MCNC: 9.3 MG/DL — SIGNIFICANT CHANGE UP (ref 8.4–10.5)
CHLORIDE SERPL-SCNC: 92 MMOL/L — LOW (ref 96–108)
CHLORIDE SERPL-SCNC: 94 MMOL/L — LOW (ref 96–108)
CHLORIDE SERPL-SCNC: 97 MMOL/L — SIGNIFICANT CHANGE UP (ref 96–108)
CK MB BLD-MCNC: 5.3 % — HIGH (ref 0–3.5)
CK MB BLD-MCNC: 5.8 % — HIGH (ref 0–3.5)
CK MB CFR SERPL CALC: 65.4 NG/ML — HIGH (ref 0–6.7)
CK MB CFR SERPL CALC: 88.2 NG/ML — HIGH (ref 0–6.7)
CK SERPL-CCNC: 1234 U/L — HIGH (ref 30–200)
CK SERPL-CCNC: 1512 U/L — HIGH (ref 30–200)
CO2 SERPL-SCNC: 22 MMOL/L — SIGNIFICANT CHANGE UP (ref 22–31)
CO2 SERPL-SCNC: 25 MMOL/L — SIGNIFICANT CHANGE UP (ref 22–31)
CO2 SERPL-SCNC: 26 MMOL/L — SIGNIFICANT CHANGE UP (ref 22–31)
CREAT SERPL-MCNC: 4.7 MG/DL — HIGH (ref 0.5–1.3)
CREAT SERPL-MCNC: 5.34 MG/DL — HIGH (ref 0.5–1.3)
CREAT SERPL-MCNC: 6.09 MG/DL — HIGH (ref 0.5–1.3)
EOSINOPHIL # BLD AUTO: 0.1 K/UL — SIGNIFICANT CHANGE UP (ref 0–0.5)
EOSINOPHIL # BLD AUTO: 0.1 K/UL — SIGNIFICANT CHANGE UP (ref 0–0.5)
EOSINOPHIL NFR BLD AUTO: 0.5 % — SIGNIFICANT CHANGE UP (ref 0–6)
EOSINOPHIL NFR BLD AUTO: 0.9 % — SIGNIFICANT CHANGE UP (ref 0–6)
GAS PNL BLDA: SIGNIFICANT CHANGE UP
GLUCOSE SERPL-MCNC: 150 MG/DL — HIGH (ref 70–99)
GLUCOSE SERPL-MCNC: 269 MG/DL — HIGH (ref 70–99)
GLUCOSE SERPL-MCNC: 307 MG/DL — HIGH (ref 70–99)
HCT VFR BLD CALC: 34.5 % — LOW (ref 39–50)
HCT VFR BLD CALC: 35.4 % — LOW (ref 39–50)
HGB BLD-MCNC: 12.1 G/DL — LOW (ref 13–17)
HGB BLD-MCNC: 12.1 G/DL — LOW (ref 13–17)
INR BLD: 0.95 RATIO — SIGNIFICANT CHANGE UP (ref 0.88–1.16)
LYMPHOCYTES # BLD AUTO: 0.7 K/UL — LOW (ref 1–3.3)
LYMPHOCYTES # BLD AUTO: 1.1 K/UL — SIGNIFICANT CHANGE UP (ref 1–3.3)
LYMPHOCYTES # BLD AUTO: 5.9 % — LOW (ref 13–44)
LYMPHOCYTES # BLD AUTO: 8.8 % — LOW (ref 13–44)
MAGNESIUM SERPL-MCNC: 2 MG/DL — SIGNIFICANT CHANGE UP (ref 1.6–2.6)
MAGNESIUM SERPL-MCNC: 2.2 MG/DL — SIGNIFICANT CHANGE UP (ref 1.6–2.6)
MCHC RBC-ENTMCNC: 31.9 PG — SIGNIFICANT CHANGE UP (ref 27–34)
MCHC RBC-ENTMCNC: 32.2 PG — SIGNIFICANT CHANGE UP (ref 27–34)
MCHC RBC-ENTMCNC: 34.3 GM/DL — SIGNIFICANT CHANGE UP (ref 32–36)
MCHC RBC-ENTMCNC: 35 GM/DL — SIGNIFICANT CHANGE UP (ref 32–36)
MCV RBC AUTO: 92 FL — SIGNIFICANT CHANGE UP (ref 80–100)
MCV RBC AUTO: 92.9 FL — SIGNIFICANT CHANGE UP (ref 80–100)
MONOCYTES # BLD AUTO: 1.1 K/UL — HIGH (ref 0–0.9)
MONOCYTES # BLD AUTO: 1.4 K/UL — HIGH (ref 0–0.9)
MONOCYTES NFR BLD AUTO: 11.2 % — SIGNIFICANT CHANGE UP (ref 2–14)
MONOCYTES NFR BLD AUTO: 9.3 % — SIGNIFICANT CHANGE UP (ref 2–14)
NEUTROPHILS # BLD AUTO: 9.6 K/UL — HIGH (ref 1.8–7.4)
NEUTROPHILS # BLD AUTO: 9.9 K/UL — HIGH (ref 1.8–7.4)
NEUTROPHILS NFR BLD AUTO: 78.9 % — HIGH (ref 43–77)
NEUTROPHILS NFR BLD AUTO: 84.2 % — HIGH (ref 43–77)
PHOSPHATE SERPL-MCNC: 3.8 MG/DL — SIGNIFICANT CHANGE UP (ref 2.5–4.5)
PHOSPHATE SERPL-MCNC: 4.3 MG/DL — SIGNIFICANT CHANGE UP (ref 2.5–4.5)
PLATELET # BLD AUTO: 109 K/UL — LOW (ref 150–400)
PLATELET # BLD AUTO: 123 K/UL — LOW (ref 150–400)
POTASSIUM SERPL-MCNC: 4.4 MMOL/L — SIGNIFICANT CHANGE UP (ref 3.5–5.3)
POTASSIUM SERPL-MCNC: 4.5 MMOL/L — SIGNIFICANT CHANGE UP (ref 3.5–5.3)
POTASSIUM SERPL-MCNC: 4.6 MMOL/L — SIGNIFICANT CHANGE UP (ref 3.5–5.3)
POTASSIUM SERPL-SCNC: 4.4 MMOL/L — SIGNIFICANT CHANGE UP (ref 3.5–5.3)
POTASSIUM SERPL-SCNC: 4.5 MMOL/L — SIGNIFICANT CHANGE UP (ref 3.5–5.3)
POTASSIUM SERPL-SCNC: 4.6 MMOL/L — SIGNIFICANT CHANGE UP (ref 3.5–5.3)
PROT SERPL-MCNC: 7 G/DL — SIGNIFICANT CHANGE UP (ref 6–8.3)
PROTHROM AB SERPL-ACNC: 10.3 SEC — SIGNIFICANT CHANGE UP (ref 10–13.1)
RBC # BLD: 3.75 M/UL — LOW (ref 4.2–5.8)
RBC # BLD: 3.81 M/UL — LOW (ref 4.2–5.8)
RBC # FLD: 11.9 % — SIGNIFICANT CHANGE UP (ref 10.3–14.5)
RBC # FLD: 12.3 % — SIGNIFICANT CHANGE UP (ref 10.3–14.5)
SODIUM SERPL-SCNC: 135 MMOL/L — SIGNIFICANT CHANGE UP (ref 135–145)
SODIUM SERPL-SCNC: 137 MMOL/L — SIGNIFICANT CHANGE UP (ref 135–145)
SODIUM SERPL-SCNC: 139 MMOL/L — SIGNIFICANT CHANGE UP (ref 135–145)
T3 SERPL-MCNC: 80 NG/DL — SIGNIFICANT CHANGE UP (ref 80–200)
T4 FREE SERPL-MCNC: 1.6 NG/DL — SIGNIFICANT CHANGE UP (ref 0.9–1.8)
TROPONIN T SERPL-MCNC: 16.46 NG/ML — HIGH (ref 0–0.06)
TROPONIN T SERPL-MCNC: 18.2 NG/ML — HIGH (ref 0–0.06)
WBC # BLD: 11.4 K/UL — HIGH (ref 3.8–10.5)
WBC # BLD: 12.6 K/UL — HIGH (ref 3.8–10.5)
WBC # FLD AUTO: 11.4 K/UL — HIGH (ref 3.8–10.5)
WBC # FLD AUTO: 12.6 K/UL — HIGH (ref 3.8–10.5)

## 2017-01-11 PROCEDURE — 99223 1ST HOSP IP/OBS HIGH 75: CPT | Mod: GC

## 2017-01-11 PROCEDURE — 99233 SBSQ HOSP IP/OBS HIGH 50: CPT | Mod: GC

## 2017-01-11 PROCEDURE — 93010 ELECTROCARDIOGRAM REPORT: CPT

## 2017-01-11 PROCEDURE — 99223 1ST HOSP IP/OBS HIGH 75: CPT

## 2017-01-11 RX ORDER — INSULIN LISPRO 100/ML
4 VIAL (ML) SUBCUTANEOUS
Qty: 0 | Refills: 0 | Status: DISCONTINUED | OUTPATIENT
Start: 2017-01-11 | End: 2017-01-13

## 2017-01-11 RX ORDER — INSULIN GLARGINE 100 [IU]/ML
6 INJECTION, SOLUTION SUBCUTANEOUS EVERY MORNING
Qty: 0 | Refills: 0 | Status: DISCONTINUED | OUTPATIENT
Start: 2017-01-11 | End: 2017-01-11

## 2017-01-11 RX ORDER — HEPARIN SODIUM 5000 [USP'U]/ML
5000 INJECTION INTRAVENOUS; SUBCUTANEOUS EVERY 12 HOURS
Qty: 0 | Refills: 0 | Status: DISCONTINUED | OUTPATIENT
Start: 2017-01-11 | End: 2017-01-24

## 2017-01-11 RX ORDER — DEXTROSE 50 % IN WATER 50 %
12.5 SYRINGE (ML) INTRAVENOUS ONCE
Qty: 0 | Refills: 0 | Status: DISCONTINUED | OUTPATIENT
Start: 2017-01-11 | End: 2017-01-24

## 2017-01-11 RX ORDER — ATORVASTATIN CALCIUM 80 MG/1
40 TABLET, FILM COATED ORAL AT BEDTIME
Qty: 0 | Refills: 0 | Status: DISCONTINUED | OUTPATIENT
Start: 2017-01-11 | End: 2017-01-24

## 2017-01-11 RX ORDER — INSULIN LISPRO 100/ML
VIAL (ML) SUBCUTANEOUS AT BEDTIME
Qty: 0 | Refills: 0 | Status: DISCONTINUED | OUTPATIENT
Start: 2017-01-11 | End: 2017-01-11

## 2017-01-11 RX ORDER — CARVEDILOL PHOSPHATE 80 MG/1
6.25 CAPSULE, EXTENDED RELEASE ORAL EVERY 12 HOURS
Qty: 0 | Refills: 0 | Status: DISCONTINUED | OUTPATIENT
Start: 2017-01-11 | End: 2017-01-11

## 2017-01-11 RX ORDER — DEXTROSE 50 % IN WATER 50 %
25 SYRINGE (ML) INTRAVENOUS ONCE
Qty: 0 | Refills: 0 | Status: DISCONTINUED | OUTPATIENT
Start: 2017-01-11 | End: 2017-01-11

## 2017-01-11 RX ORDER — INSULIN LISPRO 100/ML
VIAL (ML) SUBCUTANEOUS EVERY 4 HOURS
Qty: 0 | Refills: 0 | Status: DISCONTINUED | OUTPATIENT
Start: 2017-01-11 | End: 2017-01-11

## 2017-01-11 RX ORDER — DEXTROSE 50 % IN WATER 50 %
1 SYRINGE (ML) INTRAVENOUS ONCE
Qty: 0 | Refills: 0 | Status: DISCONTINUED | OUTPATIENT
Start: 2017-01-11 | End: 2017-01-24

## 2017-01-11 RX ORDER — SODIUM CHLORIDE 9 MG/ML
1000 INJECTION, SOLUTION INTRAVENOUS
Qty: 0 | Refills: 0 | Status: DISCONTINUED | OUTPATIENT
Start: 2017-01-11 | End: 2017-01-11

## 2017-01-11 RX ORDER — GLUCAGON INJECTION, SOLUTION 0.5 MG/.1ML
1 INJECTION, SOLUTION SUBCUTANEOUS ONCE
Qty: 0 | Refills: 0 | Status: DISCONTINUED | OUTPATIENT
Start: 2017-01-11 | End: 2017-01-24

## 2017-01-11 RX ORDER — LOSARTAN POTASSIUM 100 MG/1
50 TABLET, FILM COATED ORAL DAILY
Qty: 0 | Refills: 0 | Status: DISCONTINUED | OUTPATIENT
Start: 2017-01-11 | End: 2017-01-11

## 2017-01-11 RX ORDER — INSULIN LISPRO 100/ML
VIAL (ML) SUBCUTANEOUS AT BEDTIME
Qty: 0 | Refills: 0 | Status: DISCONTINUED | OUTPATIENT
Start: 2017-01-11 | End: 2017-01-19

## 2017-01-11 RX ORDER — DEXTROSE 50 % IN WATER 50 %
1 SYRINGE (ML) INTRAVENOUS ONCE
Qty: 0 | Refills: 0 | Status: DISCONTINUED | OUTPATIENT
Start: 2017-01-11 | End: 2017-01-11

## 2017-01-11 RX ORDER — DEXTROSE 50 % IN WATER 50 %
25 SYRINGE (ML) INTRAVENOUS ONCE
Qty: 0 | Refills: 0 | Status: DISCONTINUED | OUTPATIENT
Start: 2017-01-11 | End: 2017-01-24

## 2017-01-11 RX ORDER — CARVEDILOL PHOSPHATE 80 MG/1
6.25 CAPSULE, EXTENDED RELEASE ORAL EVERY 12 HOURS
Qty: 0 | Refills: 0 | Status: DISCONTINUED | OUTPATIENT
Start: 2017-01-11 | End: 2017-01-24

## 2017-01-11 RX ORDER — INSULIN LISPRO 100/ML
2 VIAL (ML) SUBCUTANEOUS ONCE
Qty: 0 | Refills: 0 | Status: COMPLETED | OUTPATIENT
Start: 2017-01-11 | End: 2017-01-11

## 2017-01-11 RX ORDER — GLUCAGON INJECTION, SOLUTION 0.5 MG/.1ML
1 INJECTION, SOLUTION SUBCUTANEOUS ONCE
Qty: 0 | Refills: 0 | Status: DISCONTINUED | OUTPATIENT
Start: 2017-01-11 | End: 2017-01-11

## 2017-01-11 RX ORDER — INSULIN LISPRO 100/ML
VIAL (ML) SUBCUTANEOUS
Qty: 0 | Refills: 0 | Status: DISCONTINUED | OUTPATIENT
Start: 2017-01-11 | End: 2017-01-11

## 2017-01-11 RX ORDER — LOSARTAN POTASSIUM 100 MG/1
50 TABLET, FILM COATED ORAL DAILY
Qty: 0 | Refills: 0 | Status: DISCONTINUED | OUTPATIENT
Start: 2017-01-11 | End: 2017-01-24

## 2017-01-11 RX ORDER — INSULIN GLARGINE 100 [IU]/ML
10 INJECTION, SOLUTION SUBCUTANEOUS ONCE
Qty: 0 | Refills: 0 | Status: DISCONTINUED | OUTPATIENT
Start: 2017-01-11 | End: 2017-01-11

## 2017-01-11 RX ORDER — INSULIN LISPRO 100/ML
VIAL (ML) SUBCUTANEOUS
Qty: 0 | Refills: 0 | Status: DISCONTINUED | OUTPATIENT
Start: 2017-01-11 | End: 2017-01-24

## 2017-01-11 RX ORDER — HEPARIN SODIUM 5000 [USP'U]/ML
5000 INJECTION INTRAVENOUS; SUBCUTANEOUS EVERY 8 HOURS
Qty: 0 | Refills: 0 | Status: DISCONTINUED | OUTPATIENT
Start: 2017-01-11 | End: 2017-01-11

## 2017-01-11 RX ORDER — CARVEDILOL PHOSPHATE 80 MG/1
3.12 CAPSULE, EXTENDED RELEASE ORAL EVERY 12 HOURS
Qty: 0 | Refills: 0 | Status: DISCONTINUED | OUTPATIENT
Start: 2017-01-11 | End: 2017-01-11

## 2017-01-11 RX ORDER — INSULIN LISPRO 100/ML
2 VIAL (ML) SUBCUTANEOUS
Qty: 0 | Refills: 0 | Status: DISCONTINUED | OUTPATIENT
Start: 2017-01-11 | End: 2017-01-11

## 2017-01-11 RX ORDER — DEXTROSE 50 % IN WATER 50 %
12.5 SYRINGE (ML) INTRAVENOUS ONCE
Qty: 0 | Refills: 0 | Status: DISCONTINUED | OUTPATIENT
Start: 2017-01-11 | End: 2017-01-11

## 2017-01-11 RX ORDER — SODIUM CHLORIDE 9 MG/ML
1000 INJECTION, SOLUTION INTRAVENOUS
Qty: 0 | Refills: 0 | Status: DISCONTINUED | OUTPATIENT
Start: 2017-01-11 | End: 2017-01-24

## 2017-01-11 RX ORDER — INSULIN GLARGINE 100 [IU]/ML
10 INJECTION, SOLUTION SUBCUTANEOUS AT BEDTIME
Qty: 0 | Refills: 0 | Status: DISCONTINUED | OUTPATIENT
Start: 2017-01-11 | End: 2017-01-11

## 2017-01-11 RX ORDER — INSULIN GLARGINE 100 [IU]/ML
6 INJECTION, SOLUTION SUBCUTANEOUS ONCE
Qty: 0 | Refills: 0 | Status: COMPLETED | OUTPATIENT
Start: 2017-01-11 | End: 2017-01-11

## 2017-01-11 RX ORDER — INSULIN GLARGINE 100 [IU]/ML
10 INJECTION, SOLUTION SUBCUTANEOUS EVERY MORNING
Qty: 0 | Refills: 0 | Status: DISCONTINUED | OUTPATIENT
Start: 2017-01-12 | End: 2017-01-12

## 2017-01-11 RX ADMIN — LOSARTAN POTASSIUM 50 MILLIGRAM(S): 100 TABLET, FILM COATED ORAL at 11:19

## 2017-01-11 RX ADMIN — ATORVASTATIN CALCIUM 40 MILLIGRAM(S): 80 TABLET, FILM COATED ORAL at 22:42

## 2017-01-11 RX ADMIN — LOSARTAN POTASSIUM 50 MILLIGRAM(S): 100 TABLET, FILM COATED ORAL at 22:42

## 2017-01-11 RX ADMIN — Medication 4: at 08:29

## 2017-01-11 RX ADMIN — Medication 6: at 11:19

## 2017-01-11 RX ADMIN — INSULIN GLARGINE 6 UNIT(S): 100 INJECTION, SOLUTION SUBCUTANEOUS at 08:26

## 2017-01-11 RX ADMIN — Medication 2 UNIT(S): at 02:00

## 2017-01-11 RX ADMIN — Medication 81 MILLIGRAM(S): at 11:19

## 2017-01-11 RX ADMIN — CARVEDILOL PHOSPHATE 6.25 MILLIGRAM(S): 80 CAPSULE, EXTENDED RELEASE ORAL at 22:42

## 2017-01-11 RX ADMIN — TICAGRELOR 90 MILLIGRAM(S): 90 TABLET ORAL at 05:45

## 2017-01-11 RX ADMIN — TICAGRELOR 90 MILLIGRAM(S): 90 TABLET ORAL at 17:20

## 2017-01-11 RX ADMIN — CARVEDILOL PHOSPHATE 3.12 MILLIGRAM(S): 80 CAPSULE, EXTENDED RELEASE ORAL at 05:45

## 2017-01-11 RX ADMIN — CARVEDILOL PHOSPHATE 6.25 MILLIGRAM(S): 80 CAPSULE, EXTENDED RELEASE ORAL at 17:20

## 2017-01-11 RX ADMIN — Medication 2 UNIT(S): at 17:15

## 2017-01-11 RX ADMIN — Medication 2 UNIT(S): at 11:31

## 2017-01-11 RX ADMIN — HEPARIN SODIUM 5000 UNIT(S): 5000 INJECTION INTRAVENOUS; SUBCUTANEOUS at 22:41

## 2017-01-12 LAB
ALBUMIN SERPL ELPH-MCNC: 3.6 G/DL — SIGNIFICANT CHANGE UP (ref 3.3–5)
ALP SERPL-CCNC: 127 U/L — HIGH (ref 40–120)
ALT FLD-CCNC: 98 U/L — HIGH (ref 10–45)
ANION GAP SERPL CALC-SCNC: 22 MMOL/L — HIGH (ref 5–17)
AST SERPL-CCNC: 109 U/L — HIGH (ref 10–40)
BASOPHILS # BLD AUTO: 0 K/UL — SIGNIFICANT CHANGE UP (ref 0–0.2)
BASOPHILS NFR BLD AUTO: 0 % — SIGNIFICANT CHANGE UP (ref 0–2)
BILIRUB SERPL-MCNC: 0.5 MG/DL — SIGNIFICANT CHANGE UP (ref 0.2–1.2)
BUN SERPL-MCNC: 60 MG/DL — HIGH (ref 7–23)
CALCIUM SERPL-MCNC: 8.5 MG/DL — SIGNIFICANT CHANGE UP (ref 8.4–10.5)
CHLORIDE SERPL-SCNC: 85 MMOL/L — LOW (ref 96–108)
CO2 SERPL-SCNC: 22 MMOL/L — SIGNIFICANT CHANGE UP (ref 22–31)
CREAT SERPL-MCNC: 7.28 MG/DL — HIGH (ref 0.5–1.3)
EOSINOPHIL # BLD AUTO: 0.35 K/UL — SIGNIFICANT CHANGE UP (ref 0–0.5)
EOSINOPHIL NFR BLD AUTO: 4.6 % — SIGNIFICANT CHANGE UP (ref 0–6)
GLUCOSE SERPL-MCNC: 364 MG/DL — HIGH (ref 70–99)
HCT VFR BLD CALC: 35.3 % — LOW (ref 39–50)
HGB BLD-MCNC: 11.2 G/DL — LOW (ref 13–17)
IMM GRANULOCYTES NFR BLD AUTO: 0.1 % — SIGNIFICANT CHANGE UP (ref 0–1.5)
LYMPHOCYTES # BLD AUTO: 0.96 K/UL — LOW (ref 1–3.3)
LYMPHOCYTES # BLD AUTO: 12.7 % — LOW (ref 13–44)
MAGNESIUM SERPL-MCNC: 2.2 MG/DL — SIGNIFICANT CHANGE UP (ref 1.6–2.6)
MCHC RBC-ENTMCNC: 29.9 PG — SIGNIFICANT CHANGE UP (ref 27–34)
MCHC RBC-ENTMCNC: 31.7 GM/DL — LOW (ref 32–36)
MCV RBC AUTO: 94.1 FL — SIGNIFICANT CHANGE UP (ref 80–100)
MONOCYTES # BLD AUTO: 0.74 K/UL — SIGNIFICANT CHANGE UP (ref 0–0.9)
MONOCYTES NFR BLD AUTO: 9.8 % — SIGNIFICANT CHANGE UP (ref 2–14)
NEUTROPHILS # BLD AUTO: 5.52 K/UL — SIGNIFICANT CHANGE UP (ref 1.8–7.4)
NEUTROPHILS NFR BLD AUTO: 72.8 % — SIGNIFICANT CHANGE UP (ref 43–77)
PHOSPHATE SERPL-MCNC: 5.9 MG/DL — HIGH (ref 2.5–4.5)
PLATELET # BLD AUTO: 141 K/UL — LOW (ref 150–400)
POTASSIUM SERPL-MCNC: 4.8 MMOL/L — SIGNIFICANT CHANGE UP (ref 3.5–5.3)
POTASSIUM SERPL-SCNC: 4.8 MMOL/L — SIGNIFICANT CHANGE UP (ref 3.5–5.3)
PROT SERPL-MCNC: 7 G/DL — SIGNIFICANT CHANGE UP (ref 6–8.3)
RBC # BLD: 3.75 M/UL — LOW (ref 4.2–5.8)
RBC # FLD: 12.8 % — SIGNIFICANT CHANGE UP (ref 10.3–14.5)
SODIUM SERPL-SCNC: 129 MMOL/L — LOW (ref 135–145)
T4 FREE SERPL-MCNC: 1.5 NG/DL — SIGNIFICANT CHANGE UP (ref 0.9–1.8)
TSH SERPL-MCNC: 0.97 UIU/ML — SIGNIFICANT CHANGE UP (ref 0.27–4.2)
WBC # BLD: 7.58 K/UL — SIGNIFICANT CHANGE UP (ref 3.8–10.5)
WBC # FLD AUTO: 7.58 K/UL — SIGNIFICANT CHANGE UP (ref 3.8–10.5)

## 2017-01-12 PROCEDURE — 99232 SBSQ HOSP IP/OBS MODERATE 35: CPT | Mod: GC

## 2017-01-12 PROCEDURE — 99232 SBSQ HOSP IP/OBS MODERATE 35: CPT

## 2017-01-12 PROCEDURE — 99233 SBSQ HOSP IP/OBS HIGH 50: CPT | Mod: GC

## 2017-01-12 RX ORDER — INSULIN GLARGINE 100 [IU]/ML
12 INJECTION, SOLUTION SUBCUTANEOUS EVERY MORNING
Qty: 0 | Refills: 0 | Status: DISCONTINUED | OUTPATIENT
Start: 2017-01-12 | End: 2017-01-14

## 2017-01-12 RX ADMIN — Medication 4 UNIT(S): at 12:43

## 2017-01-12 RX ADMIN — Medication 100 MILLIGRAM(S): at 11:14

## 2017-01-12 RX ADMIN — Medication 4 UNIT(S): at 17:10

## 2017-01-12 RX ADMIN — Medication 100 MILLIGRAM(S): at 05:09

## 2017-01-12 RX ADMIN — TICAGRELOR 90 MILLIGRAM(S): 90 TABLET ORAL at 05:38

## 2017-01-12 RX ADMIN — LOSARTAN POTASSIUM 50 MILLIGRAM(S): 100 TABLET, FILM COATED ORAL at 05:38

## 2017-01-12 RX ADMIN — Medication 6: at 08:08

## 2017-01-12 RX ADMIN — Medication 81 MILLIGRAM(S): at 12:43

## 2017-01-12 RX ADMIN — HEPARIN SODIUM 5000 UNIT(S): 5000 INJECTION INTRAVENOUS; SUBCUTANEOUS at 22:42

## 2017-01-12 RX ADMIN — ATORVASTATIN CALCIUM 40 MILLIGRAM(S): 80 TABLET, FILM COATED ORAL at 22:42

## 2017-01-12 RX ADMIN — Medication 4 UNIT(S): at 08:08

## 2017-01-12 RX ADMIN — INSULIN GLARGINE 10 UNIT(S): 100 INJECTION, SOLUTION SUBCUTANEOUS at 08:08

## 2017-01-12 RX ADMIN — HEPARIN SODIUM 5000 UNIT(S): 5000 INJECTION INTRAVENOUS; SUBCUTANEOUS at 09:14

## 2017-01-12 RX ADMIN — Medication 3: at 12:43

## 2017-01-12 RX ADMIN — Medication 100 MILLIGRAM(S): at 23:55

## 2017-01-12 RX ADMIN — CARVEDILOL PHOSPHATE 6.25 MILLIGRAM(S): 80 CAPSULE, EXTENDED RELEASE ORAL at 17:10

## 2017-01-12 RX ADMIN — Medication 100 MILLIGRAM(S): at 17:10

## 2017-01-12 RX ADMIN — CARVEDILOL PHOSPHATE 6.25 MILLIGRAM(S): 80 CAPSULE, EXTENDED RELEASE ORAL at 05:38

## 2017-01-12 RX ADMIN — TICAGRELOR 90 MILLIGRAM(S): 90 TABLET ORAL at 17:10

## 2017-01-12 NOTE — DIETITIAN INITIAL EVALUATION ADULT. - NS AS NUTRI INTERV FEED ASSISTANCE
Feeding Assistance/Other (specify)/Encouraged pt to maintain oral intake for optimal health. Recommended consuming nutrient dense snacks with meals and supplementing Nepro as needed. Reviewed protein rich items on menu and encouraged pt to eat slowly throughout the day as able

## 2017-01-12 NOTE — DIETITIAN INITIAL EVALUATION ADULT. - PT NOT SOURCE
Pt reports speaking both  [place] and English. Interview conducted in English per pt request/other (specify)

## 2017-01-12 NOTE — DIETITIAN INITIAL EVALUATION ADULT. - ORAL INTAKE PTA
Pt reports good oral intake 2 weeks prior to admission, however started have decreased appetite affecting his intake./poor

## 2017-01-12 NOTE — DIETITIAN INITIAL EVALUATION ADULT. - DIET TYPE
renal replacement pts:no protein restr,no conc K & phos, low sodium/DASH/TLC (sodium and cholesterol restricted diet)/consistent carbohydrate (evening snack)

## 2017-01-12 NOTE — DIETITIAN INITIAL EVALUATION ADULT. - NUTRITIONGOAL OUTCOME1
1. Pt will meet at least 75% of nutrient needs 2. teach back points from diet education 1. Pt will meet at least 75% of nutrient needs 1. Pt will meet at least 75% of estimated nutrient needs

## 2017-01-12 NOTE — DIETITIAN INITIAL EVALUATION ADULT. - ETIOLOGY
Chronic disease  related malnutrition secondary to ESRD and Pain Unwilling to applying food and nutrition related education Increased nutrient needs related to ESRD Unable to apply food and nutrition related education

## 2017-01-12 NOTE — DIETITIAN INITIAL EVALUATION ADULT. - SIGNS/SYMPTOMS
Weight loss 6% in 2weeks, Estimated energy intake < 50% of nutrient requirements, decreased appetite Verbalizes incomplete information regarding diet management of diabetes and ESRD Admitted with DKA, Blood Glucose: 411 mg/dL, Phos:5.9, HgbA1C: 12.2%

## 2017-01-12 NOTE — DIETITIAN INITIAL EVALUATION ADULT. - NS AS NUTRI INTERV MEDICAL AND FOOD SUPPLEMENTS
Nutrient intake needs to be increased therefore recommend Nepro twice daily providing (kcal: 850/Protein: 38.2grams) as medically feasible/Commercial beverage

## 2017-01-12 NOTE — PROVIDER CONTACT NOTE (OTHER) - ACTION/TREATMENT ORDERED:
Give sliding scale, pre-meal, and AM lantus now. Lantus was increased yesterday from 6 units to 10 today, so his AM FS was expected to be a little high. Recheck FS again before lunch.

## 2017-01-12 NOTE — DIETITIAN INITIAL EVALUATION ADULT. - PERTINENT LABORATORY DATA
Reviewed:1/12: Na: 129, BUN: 60, Cr: 7.25, Blood Glucose: 364, Phos: 5.9. 1/10 Hgb A1C: 12.2. Finger Sticks: 1/11-12:182-350 Reviewed:1/12: Na: 129, BUN: 60, Cr: 7.25, Blood Glucose: 364, Phos: 5.9. AST/SGOT:109, ALT/SGPT:98. 1/10 Hgb A1C:12.2. Finger Sticks:1/11:106-248, 1/12:Finger stick:411

## 2017-01-12 NOTE — DIETITIAN INITIAL EVALUATION ADULT. - NS AS NUTRI DX KNOWLEDGE BELIEFS2
Undesirable food choices/Limited adherence to nutrition - related recommendations Limited adherence to nutrition - related recommendations

## 2017-01-12 NOTE — DIETITIAN INITIAL EVALUATION ADULT. - NUTRITION INTERVENTION
Meals and Snack/Nutrition Education/Medical Food Supplements/Feeding Assistance Nutrition Education Medical Food Supplements/Feeding Assistance/Meals and Snack

## 2017-01-12 NOTE — DIETITIAN INITIAL EVALUATION ADULT. - OTHER INFO
PT consulted for DKI and initial finger sticks 800s. Pt visited and reports persistent lack of appetite and oral intake of meals <25% in-house. No GI distress of nausea/ vomiting or chewing and swallowing issues noted at this time. Pt reports 2 weeks prior to admission he was eating well and weighed 148 pounds but quickly began to lose weight with a PTA bodyweight of 138 pounds due to abdominal pt. Pt current bodyweight: 136 pounds. Per previous RD note 6/16 pt with a usual bodyweight of 150lbs. Note pt with HD dialysis treatments 1/9 with 2 L fluid removal. Questions accuracy of weight trends secondary to possible increased weight loss masked by fluid shifts. Pt does not endorse diet supplements at this time.  PT reports checking is blood sugars at home up to 5-6 times a day which ranges from 86-300s. NKFA. PT consulted for DKA and initial finger sticks 800s. Pt visited and reports persistent lack of appetite and oral intake of meals <25% in-house. No GI distress of nausea/ vomiting or chewing and swallowing issues noted at this time. Pt reports 2 weeks prior to admission he was eating well and weighed 148 pounds but quickly began to lose weight with a PTA bodyweight of 138 pounds due to abdominal pain. Pt current bodyweight: 136 pounds. Per previous RD note 6/16 pt with a usual bodyweight of 150lbs. Note pt with HD treatments 1/9 with 2 L fluid removal. Questions accuracy of weight trends secondary to possible increased weight loss masked by fluid shifts. Pt does not endorse diet supplements at this time.  PT reports checking is blood sugars at home up to 5-6 times a day which ranges from 86-300s. NKFA. PT consulted for DKA and initial finger sticks 800s. Pt visited and reports persistent lack of appetite and oral intake of meals <25% in-house. No GI distress of nausea/ vomiting or chewing and swallowing issues noted at this time. Pt reports 2 weeks prior to admission he was eating well and weighed 148 pounds but quickly began to lose weight with a PTA bodyweight of 138 pounds due to abdominal pain. Pt current bodyweight: 136 pounds. Per previous RD note 6/16 pt with a usual bodyweight of 150lbs. Note pt with HD treatments 1/9 with 2 L fluid removal. Questions accuracy of weight trends secondary to possible increased weight loss masked by fluid shifts. Pt does not endorse diet supplements at this time.  PT reports checking his blood sugars at home up to 5-6 times a day which ranges from 86-300s. NKFA.

## 2017-01-12 NOTE — DIETITIAN INITIAL EVALUATION ADULT. - NS AS NUTRI INTERV ED CONTENT
Survival information/Purpose of the nutrition education/Provided extensive education on the importance of managing blood glucose levels with diabetes. Reviewed foods with CHO, serving sizes and food pairing. Encouraged pt to check blood sugars often at home, up to three times a day. Reviewed diet management of ESRD by reviewing foods low/high in Na, K, and Phos. Handouts from nutrition care manual on Carbohydrate Counting with Diabetes, Low Sodium and Chronic Kidney Disease Stage 5 Nutrition Therapy provided in both [place]ian and English. Pt receptive to education at this time. Survival information/Provided extensive education on the importance of managing blood glucose levels with diabetes. Reviewed foods with CHO, serving sizes and food pairing. Encouraged pt to check blood sugars often at home, up to three times a day. Reviewed diet management of ESRD by reviewing foods low/high in Na, K, and Phos. Handouts from nutrition care manual on Carbohydrate Counting with Diabetes, Low Sodium and Chronic Kidney Disease Stage 5 Nutrition Therapy provided. Pt reports been both  [place] and English speaking, nutrition education provided in both languages. Pt receptive to education at this time./Purpose of the nutrition education

## 2017-01-12 NOTE — DIETITIAN INITIAL EVALUATION ADULT. - NS AS NUTRI INTERV MEALS SNACK
General/healthful diet/Recommend continue current diet of Renal, Consistent CHO with evening snacks and DASH/TLC. Monitor PO intake for improvements and labs/weights. RD to remain available to offer additional recommendations.

## 2017-01-12 NOTE — DIETITIAN INITIAL EVALUATION ADULT. - PERTINENT MEDS FT
Reviewed: Lantus, Humalog Sliding Scale, Dextrose Reviewed: Lantus, Humalog Sliding Scale, Dextrose 5% @ 50ml/hr

## 2017-01-12 NOTE — DIETITIAN INITIAL EVALUATION ADULT. - NS AS NUTRI INTERV ED CONTENT3
Provided extensive education on the importance of managing blood glucose levels with diabetes. Reviewed foods with CHO, serving sizes and food pairing. Encouraged pt to check blood sugars often at home, up to three times a day. Reviewed diet management of ESRD by reviewing foods low/high in Na, K, and Phos. Handouts from nutrition care manual on Carbohydrate Counting with Diabetes, Low Sodium and Chronic Kidney Disease Stage 5 Nutrition Therapy provided in both English and Hungarian. Pt receptive to education at this time.

## 2017-01-12 NOTE — DIETITIAN INITIAL EVALUATION ADULT. - NS AS NUTRI DX NUTRIENT
Moderate protein energy malnutrition/Malnutrition Malnutrition/Chronic moderate protein/ energy malnutrition

## 2017-01-12 NOTE — DIETITIAN INITIAL EVALUATION ADULT. - NS FNS WEIGHT USED FOR CALC
136.4 pounds. Defer fluids to team secondary to ESRD./other (specify) current/136.4 pounds. Defer fluids to team secondary to ESRD.

## 2017-01-12 NOTE — DIETITIAN INITIAL EVALUATION ADULT. - ADHERENCE
Pt reports following a low sodium/diabetes/ renal diet at home with the guidance of his outpatient renal RD. Per pt his usual meals consist of breakfast: Eggs, wheat bread with coffee. Lunch:  Brown rice, beans and/or chicken soup. Dinner: Salad with chicken. Per pt his home beverages include apple juice secondary to needing energy post dialysis treatments./fair

## 2017-01-12 NOTE — DIETITIAN INITIAL EVALUATION ADULT. - ENERGY NEEDS
Height: 5feet 5inches, Weight (Current): 136.4 pounds, IBW:154 pounds +/-10%, %IBW:88%, Current BMI: 21.1. Pt with pmh HTN, DM2, ESRD on HD m/w/f as a transfer from  due to STEMI and DKI. Patient s/p stent to EDGAR and mLAD. Pt currently with epigastric pain but with DKI resolved. Plan for JEAN-PIERRE. Height: 5feet 5inches, Weight (Current): 136.4 pounds, IBW:154 pounds +/-10%, %IBW:88%, Current BMI: 21.1. Pt with pmh HTN, DM2, ESRD on HD m/w/f as a transfer from  due to STEMI and DKI. Patient s/p stent to EDGAR and mLAD. Pt currently with epigastric pain but with DKA resolved. Plan for JEAN-PIERRE. Height: 5feet 5inches, Weight (Current): 136.4 pounds, IBW:154 pounds +/-10%, %IBW:88%, Current BMI: 21.1. Pt with pmh HTN, DM2, ESRD on HD m/w/f as a transfer from  due to STEMI and DKA. Patient s/p stent to EDGAR and mLAD. Pt currently with epigastric pain but with DKA resolved. Plan for JEAN-PIERRE.

## 2017-01-13 LAB
ALBUMIN SERPL ELPH-MCNC: 3.3 G/DL — SIGNIFICANT CHANGE UP (ref 3.3–5)
ALP SERPL-CCNC: 117 U/L — SIGNIFICANT CHANGE UP (ref 40–120)
ALT FLD-CCNC: 73 U/L — HIGH (ref 10–45)
ANION GAP SERPL CALC-SCNC: 23 MMOL/L — HIGH (ref 5–17)
AST SERPL-CCNC: 51 U/L — HIGH (ref 10–40)
BILIRUB SERPL-MCNC: 0.4 MG/DL — SIGNIFICANT CHANGE UP (ref 0.2–1.2)
BUN SERPL-MCNC: 78 MG/DL — HIGH (ref 7–23)
CALCIUM SERPL-MCNC: 8.2 MG/DL — LOW (ref 8.4–10.5)
CHLORIDE SERPL-SCNC: 84 MMOL/L — LOW (ref 96–108)
CO2 SERPL-SCNC: 21 MMOL/L — LOW (ref 22–31)
CREAT SERPL-MCNC: 9.29 MG/DL — HIGH (ref 0.5–1.3)
GLUCOSE SERPL-MCNC: 303 MG/DL — HIGH (ref 70–99)
HCT VFR BLD CALC: 33.3 % — LOW (ref 39–50)
HGB BLD-MCNC: 11.1 G/DL — LOW (ref 13–17)
MAGNESIUM SERPL-MCNC: 2.1 MG/DL — SIGNIFICANT CHANGE UP (ref 1.6–2.6)
MCHC RBC-ENTMCNC: 30.1 PG — SIGNIFICANT CHANGE UP (ref 27–34)
MCHC RBC-ENTMCNC: 33.3 GM/DL — SIGNIFICANT CHANGE UP (ref 32–36)
MCV RBC AUTO: 90.2 FL — SIGNIFICANT CHANGE UP (ref 80–100)
PHOSPHATE SERPL-MCNC: 6.2 MG/DL — HIGH (ref 2.5–4.5)
PLATELET # BLD AUTO: 162 K/UL — SIGNIFICANT CHANGE UP (ref 150–400)
POTASSIUM SERPL-MCNC: 4.9 MMOL/L — SIGNIFICANT CHANGE UP (ref 3.5–5.3)
POTASSIUM SERPL-SCNC: 4.9 MMOL/L — SIGNIFICANT CHANGE UP (ref 3.5–5.3)
PROT SERPL-MCNC: 6.8 G/DL — SIGNIFICANT CHANGE UP (ref 6–8.3)
RBC # BLD: 3.69 M/UL — LOW (ref 4.2–5.8)
RBC # FLD: 12.5 % — SIGNIFICANT CHANGE UP (ref 10.3–14.5)
SODIUM SERPL-SCNC: 128 MMOL/L — LOW (ref 135–145)
WBC # BLD: 10.58 K/UL — HIGH (ref 3.8–10.5)
WBC # FLD AUTO: 10.58 K/UL — HIGH (ref 3.8–10.5)

## 2017-01-13 PROCEDURE — 76536 US EXAM OF HEAD AND NECK: CPT | Mod: 26

## 2017-01-13 PROCEDURE — 99233 SBSQ HOSP IP/OBS HIGH 50: CPT

## 2017-01-13 PROCEDURE — 99232 SBSQ HOSP IP/OBS MODERATE 35: CPT | Mod: GC

## 2017-01-13 PROCEDURE — 99233 SBSQ HOSP IP/OBS HIGH 50: CPT | Mod: GC

## 2017-01-13 RX ORDER — TICAGRELOR 90 MG/1
1 TABLET ORAL
Qty: 360 | Refills: 0 | OUTPATIENT
Start: 2017-01-13 | End: 2017-07-12

## 2017-01-13 RX ORDER — INSULIN LISPRO 100/ML
2 VIAL (ML) SUBCUTANEOUS
Qty: 0 | Refills: 0 | Status: DISCONTINUED | OUTPATIENT
Start: 2017-01-13 | End: 2017-01-13

## 2017-01-13 RX ORDER — CLOPIDOGREL BISULFATE 75 MG/1
75 TABLET, FILM COATED ORAL DAILY
Qty: 0 | Refills: 0 | Status: DISCONTINUED | OUTPATIENT
Start: 2017-01-14 | End: 2017-01-24

## 2017-01-13 RX ORDER — INSULIN LISPRO 100/ML
4 VIAL (ML) SUBCUTANEOUS
Qty: 0 | Refills: 0 | Status: DISCONTINUED | OUTPATIENT
Start: 2017-01-13 | End: 2017-01-14

## 2017-01-13 RX ORDER — CLOPIDOGREL BISULFATE 75 MG/1
600 TABLET, FILM COATED ORAL ONCE
Qty: 0 | Refills: 0 | Status: COMPLETED | OUTPATIENT
Start: 2017-01-13 | End: 2017-01-13

## 2017-01-13 RX ADMIN — HEPARIN SODIUM 5000 UNIT(S): 5000 INJECTION INTRAVENOUS; SUBCUTANEOUS at 21:37

## 2017-01-13 RX ADMIN — Medication 4 UNIT(S): at 07:59

## 2017-01-13 RX ADMIN — Medication 1: at 17:48

## 2017-01-13 RX ADMIN — CLOPIDOGREL BISULFATE 600 MILLIGRAM(S): 75 TABLET, FILM COATED ORAL at 17:57

## 2017-01-13 RX ADMIN — ATORVASTATIN CALCIUM 40 MILLIGRAM(S): 80 TABLET, FILM COATED ORAL at 21:37

## 2017-01-13 RX ADMIN — TICAGRELOR 90 MILLIGRAM(S): 90 TABLET ORAL at 06:06

## 2017-01-13 RX ADMIN — HEPARIN SODIUM 5000 UNIT(S): 5000 INJECTION INTRAVENOUS; SUBCUTANEOUS at 14:59

## 2017-01-13 RX ADMIN — Medication 2: at 14:58

## 2017-01-13 RX ADMIN — Medication 81 MILLIGRAM(S): at 14:59

## 2017-01-13 RX ADMIN — CARVEDILOL PHOSPHATE 6.25 MILLIGRAM(S): 80 CAPSULE, EXTENDED RELEASE ORAL at 17:57

## 2017-01-13 RX ADMIN — Medication 5: at 07:58

## 2017-01-13 RX ADMIN — Medication 2 UNIT(S): at 17:48

## 2017-01-13 RX ADMIN — INSULIN GLARGINE 12 UNIT(S): 100 INJECTION, SOLUTION SUBCUTANEOUS at 07:59

## 2017-01-13 RX ADMIN — Medication 2 UNIT(S): at 14:58

## 2017-01-13 NOTE — PROVIDER CONTACT NOTE (OTHER) - BACKGROUND
61M PMH HTN, DM2, ESRD on HD (MWF), transferred from  with IWSTEMI, s/p EDGAR to mLAD (90% stenosis), and hyperglycemia to 900s requiring insulin gtt.

## 2017-01-14 LAB
ALBUMIN SERPL ELPH-MCNC: 3 G/DL — LOW (ref 3.3–5)
ALP SERPL-CCNC: 109 U/L — SIGNIFICANT CHANGE UP (ref 40–120)
ALT FLD-CCNC: 45 U/L — SIGNIFICANT CHANGE UP (ref 10–45)
ANION GAP SERPL CALC-SCNC: 16 MMOL/L — SIGNIFICANT CHANGE UP (ref 5–17)
ANION GAP SERPL CALC-SCNC: 18 MMOL/L — HIGH (ref 5–17)
AST SERPL-CCNC: 27 U/L — SIGNIFICANT CHANGE UP (ref 10–40)
BILIRUB SERPL-MCNC: 0.4 MG/DL — SIGNIFICANT CHANGE UP (ref 0.2–1.2)
BUN SERPL-MCNC: 43 MG/DL — HIGH (ref 7–23)
BUN SERPL-MCNC: 53 MG/DL — HIGH (ref 7–23)
CALCIUM SERPL-MCNC: 8.3 MG/DL — LOW (ref 8.4–10.5)
CALCIUM SERPL-MCNC: 8.3 MG/DL — LOW (ref 8.4–10.5)
CHLORIDE SERPL-SCNC: 89 MMOL/L — LOW (ref 96–108)
CHLORIDE SERPL-SCNC: 91 MMOL/L — LOW (ref 96–108)
CO2 SERPL-SCNC: 22 MMOL/L — SIGNIFICANT CHANGE UP (ref 22–31)
CO2 SERPL-SCNC: 25 MMOL/L — SIGNIFICANT CHANGE UP (ref 22–31)
CREAT SERPL-MCNC: 6.27 MG/DL — HIGH (ref 0.5–1.3)
CREAT SERPL-MCNC: 7.35 MG/DL — HIGH (ref 0.5–1.3)
GLUCOSE SERPL-MCNC: 292 MG/DL — HIGH (ref 70–99)
GLUCOSE SERPL-MCNC: 443 MG/DL — HIGH (ref 70–99)
HCT VFR BLD CALC: 31.3 % — LOW (ref 39–50)
HGB BLD-MCNC: 10.1 G/DL — LOW (ref 13–17)
MAGNESIUM SERPL-MCNC: 2 MG/DL — SIGNIFICANT CHANGE UP (ref 1.6–2.6)
MCHC RBC-ENTMCNC: 29.8 PG — SIGNIFICANT CHANGE UP (ref 27–34)
MCHC RBC-ENTMCNC: 32.3 GM/DL — SIGNIFICANT CHANGE UP (ref 32–36)
MCV RBC AUTO: 92.3 FL — SIGNIFICANT CHANGE UP (ref 80–100)
PHOSPHATE SERPL-MCNC: 4 MG/DL — SIGNIFICANT CHANGE UP (ref 2.5–4.5)
PLATELET # BLD AUTO: 161 K/UL — SIGNIFICANT CHANGE UP (ref 150–400)
POTASSIUM SERPL-MCNC: 4 MMOL/L — SIGNIFICANT CHANGE UP (ref 3.5–5.3)
POTASSIUM SERPL-MCNC: 4.3 MMOL/L — SIGNIFICANT CHANGE UP (ref 3.5–5.3)
POTASSIUM SERPL-SCNC: 4 MMOL/L — SIGNIFICANT CHANGE UP (ref 3.5–5.3)
POTASSIUM SERPL-SCNC: 4.3 MMOL/L — SIGNIFICANT CHANGE UP (ref 3.5–5.3)
PROT SERPL-MCNC: 6 G/DL — SIGNIFICANT CHANGE UP (ref 6–8.3)
RBC # BLD: 3.39 M/UL — LOW (ref 4.2–5.8)
RBC # FLD: 12.6 % — SIGNIFICANT CHANGE UP (ref 10.3–14.5)
SODIUM SERPL-SCNC: 130 MMOL/L — LOW (ref 135–145)
SODIUM SERPL-SCNC: 131 MMOL/L — LOW (ref 135–145)
WBC # BLD: 7.78 K/UL — SIGNIFICANT CHANGE UP (ref 3.8–10.5)
WBC # FLD AUTO: 7.78 K/UL — SIGNIFICANT CHANGE UP (ref 3.8–10.5)

## 2017-01-14 PROCEDURE — 71010: CPT | Mod: 26

## 2017-01-14 PROCEDURE — 93306 TTE W/DOPPLER COMPLETE: CPT | Mod: 26

## 2017-01-14 PROCEDURE — 99232 SBSQ HOSP IP/OBS MODERATE 35: CPT

## 2017-01-14 RX ORDER — INSULIN GLARGINE 100 [IU]/ML
15 INJECTION, SOLUTION SUBCUTANEOUS EVERY MORNING
Qty: 0 | Refills: 0 | Status: DISCONTINUED | OUTPATIENT
Start: 2017-01-14 | End: 2017-01-15

## 2017-01-14 RX ORDER — INSULIN LISPRO 100/ML
4 VIAL (ML) SUBCUTANEOUS
Qty: 0 | Refills: 0 | Status: DISCONTINUED | OUTPATIENT
Start: 2017-01-14 | End: 2017-01-15

## 2017-01-14 RX ORDER — ACETAMINOPHEN 500 MG
650 TABLET ORAL ONCE
Qty: 0 | Refills: 0 | Status: COMPLETED | OUTPATIENT
Start: 2017-01-14 | End: 2017-01-14

## 2017-01-14 RX ADMIN — CARVEDILOL PHOSPHATE 6.25 MILLIGRAM(S): 80 CAPSULE, EXTENDED RELEASE ORAL at 06:12

## 2017-01-14 RX ADMIN — Medication 4 UNIT(S): at 18:18

## 2017-01-14 RX ADMIN — Medication 650 MILLIGRAM(S): at 21:41

## 2017-01-14 RX ADMIN — Medication 4 UNIT(S): at 09:02

## 2017-01-14 RX ADMIN — LOSARTAN POTASSIUM 50 MILLIGRAM(S): 100 TABLET, FILM COATED ORAL at 06:11

## 2017-01-14 RX ADMIN — CLOPIDOGREL BISULFATE 75 MILLIGRAM(S): 75 TABLET, FILM COATED ORAL at 12:41

## 2017-01-14 RX ADMIN — HEPARIN SODIUM 5000 UNIT(S): 5000 INJECTION INTRAVENOUS; SUBCUTANEOUS at 09:07

## 2017-01-14 RX ADMIN — ATORVASTATIN CALCIUM 40 MILLIGRAM(S): 80 TABLET, FILM COATED ORAL at 21:41

## 2017-01-14 RX ADMIN — Medication 1: at 18:18

## 2017-01-14 RX ADMIN — Medication 81 MILLIGRAM(S): at 12:41

## 2017-01-14 RX ADMIN — HEPARIN SODIUM 5000 UNIT(S): 5000 INJECTION INTRAVENOUS; SUBCUTANEOUS at 21:41

## 2017-01-14 RX ADMIN — Medication 3: at 09:01

## 2017-01-14 RX ADMIN — INSULIN GLARGINE 12 UNIT(S): 100 INJECTION, SOLUTION SUBCUTANEOUS at 09:05

## 2017-01-14 RX ADMIN — Medication 4 UNIT(S): at 12:41

## 2017-01-14 RX ADMIN — Medication 6: at 12:41

## 2017-01-14 NOTE — DISCHARGE NOTE ADULT - ADDITIONAL INSTRUCTIONS
Please follow up with cardiology as an outpatient - phone number is listed below.  Please follow up at the endocrinology clinic for further management of your diabetes - call [phone] to make an appointment.

## 2017-01-14 NOTE — DISCHARGE NOTE ADULT - NS AS DC HF EDUCATION INSTRUCTIONS
Low salt diet/Activities as tolerated/Call Primary Care Provider for follow-up after discharge/Monitor Weight Daily/Report weight gain of 2 or more pounds in one day or 3 or more pounds in one week, worsening shortness of breath, fatigue, weakness, increased swelling of hands and feet to primary care provider

## 2017-01-14 NOTE — DISCHARGE NOTE ADULT - MEDICATION SUMMARY - MEDICATIONS TO TAKE
I will START or STAY ON the medications listed below when I get home from the hospital:    Rolling Walker  -- One RW  -- Indication: For for assistance    ceFAZolin    -- 2gm IV on Mondays after HD, 2gm IV on Wednesdays after HD, and 3gm IV on Fridays after HD. Last day of Antibiotics is 3/1/2017  -- Indication: For MSSA Bacteremia    aspirin 81 mg oral tablet, chewable  -- 1 tab(s) by mouth once a day  -- Indication: For ST elevation myocardial infarction (STEMI)    losartan 50 mg oral tablet  -- 1 tab(s) by mouth once a day  -- Indication: For hypertension    HumaLOG KwikPen 100 units/mL subcutaneous solution  -- 8 unit(s) subcutaneous bwfore before breakfast  -- Indication: For Diabetes mellitus type 2 in nonobese    Lantus Solostar Pen 100 units/mL subcutaneous solution  -- 15 unit(s) subcutaneous once a day (at bedtime)  -- Indication: For Diabetes mellitus type 2 in nonobese    HumaLOG KwikPen 100 units/mL subcutaneous solution  -- 5 unit(s) subcutaneous before lunch and dinner  -- Indication: For Diabetes mellitus type 2 in nonobese    atorvastatin 40 mg oral tablet  -- 1 tab(s) by mouth once a day (at bedtime)  -- Indication: For ST elevation myocardial infarction (STEMI)    clopidogrel 75 mg oral tablet  -- 1 tab(s) by mouth once a day  -- Indication: For STEMI (ST elevation myocardial infarction)    carvedilol 6.25 mg oral tablet  -- 1 tab(s) by mouth every 12 hours  -- Indication: For STEMI (ST elevation myocardial infarction)    pantoprazole 40 mg oral delayed release tablet  -- 1 tab(s) by mouth once a day (before a meal)  -- Indication: For need for prophylactic measure

## 2017-01-14 NOTE — DISCHARGE NOTE ADULT - CARE PROVIDER_API CALL
Bobo Mckeon), Cardiovascular Disease; Internal Medicine  [address] [place] [address]  Phone: [phone]  Fax: [phone]    [facility],   [phone]  [address] [place] [id number]  Phone: (   )    -  Fax: (   )    -

## 2017-01-14 NOTE — DISCHARGE NOTE ADULT - PROVIDER TOKENS
TOKEN:'[id number]',FREE:[LAST:[Endocrine Clinic],PHONE:[(   )    -],FAX:[(   )    -],ADDRESS:[[phone]  [address] [place] [address]]]

## 2017-01-14 NOTE — DISCHARGE NOTE ADULT - HOME CARE AGENCY
[facility] care  agency . assessment. teaching and evaluation for Home aide and home physical therapy

## 2017-01-14 NOTE — DISCHARGE NOTE ADULT - CARE PLAN
Principal Discharge DX:	ST elevation myocardial infarction (STEMI), unspecified artery  Goal:	Continue medications  Instructions for follow-up, activity and diet:	You have a heart attack and had a stent placed. Continue Aspirin, plavix, coreg, losartan and lipitor. Follow up with your cardiologist in 1 week.  Secondary Diagnosis:	Secondary hypertension  Goal:	Stable.  Instructions for follow-up, activity and diet:	Continue with coreg and losartan.  Secondary Diagnosis:	ESRD (end stage renal disease) on dialysis  Instructions for follow-up, activity and diet:	Continue with your nephrologist as an outpatient.  Secondary Diagnosis:	Transaminitis  Instructions for follow-up, activity and diet:	Follow up with your Primary care physician regarding elevated liver function tests. The ultrasound of your liver showed evidence of hepatic congestion.  Secondary Diagnosis:	Diabetes mellitus type 2 in nonobese  Instructions for follow-up, activity and diet:	You should follow up with your primary care physician regarding your diabetes. You will need to go on insulin as you HgA1c was 12.2 You were instructed on how to take insulin, because whe nyou came in your diabetes was very poorly controlled and you developed acid in your blood. Principal Discharge DX:	ST elevation myocardial infarction (STEMI), unspecified artery  Goal:	Continue medications  Instructions for follow-up, activity and diet:	You have a heart attack and had a stent placed. Continue Aspirin, plavix, coreg, losartan and lipitor. Follow up with your cardiologist in 1 week.  Secondary Diagnosis:	Secondary hypertension  Goal:	Stable.  Instructions for follow-up, activity and diet:	Continue with coreg and losartan.  Secondary Diagnosis:	ESRD (end stage renal disease) on dialysis  Instructions for follow-up, activity and diet:	Continue with your nephrologist as an outpatient.  Secondary Diagnosis:	Transaminitis  Instructions for follow-up, activity and diet:	Follow up with your Primary care physician regarding elevated liver function tests. The ultrasound of your liver showed evidence of hepatic congestion.  Secondary Diagnosis:	Diabetes mellitus type 2 in nonobese  Instructions for follow-up, activity and diet:	You should follow up with your primary care physician regarding your diabetes. You will need to go on insulin as you HgA1c was 12.2 You were instructed on how to take insulin, because when you came in your diabetes was very poorly controlled and you developed acid in your blood.

## 2017-01-14 NOTE — DISCHARGE NOTE ADULT - PATIENT PORTAL LINK FT
“You can access the Follow[facility] Patient Portal, offered by [facility], by registering with the following website: [email or web address]”

## 2017-01-14 NOTE — DISCHARGE NOTE ADULT - MEDICATION SUMMARY - MEDICATIONS TO CHANGE
I will SWITCH the dose or number of times a day I take the medications listed below when I get home from the hospital:    hydrALAZINE 50 mg oral tablet  -- 1 tab(s) by mouth 3 times a day    labetalol 200 mg oral tablet  -- 2 tab(s) by mouth 2 times a day

## 2017-01-14 NOTE — DISCHARGE NOTE ADULT - SECONDARY DIAGNOSIS.
Secondary hypertension ESRD (end stage renal disease) on dialysis Transaminitis Diabetes mellitus type 2 in nonobese

## 2017-01-14 NOTE — DISCHARGE NOTE ADULT - HOSPITAL COURSE
62 yo M pmh of HTN, DM2, ESRD (on HD MWF) presents s/p 1 EDGAR to the mid LAD for STEMI. Patient tolerated the procedure well and was discharged by cardiology on ASA, plavix, coreg, losartan and lipitor. The patient was evaluated by sonogram for a LV thrombus due to an akinetic wall and echo showed: _____________The patient was noted to have elevated LFTs in the setting of hepatic congestion on sono which were trended and improved. The patient was also in DKA on admission which improved with insulin administration. Endocrinology was consulted and recommended lantus and humalog on discharge with a regimen of ______. The patient was discharged with diabetic teaching. The patient was continued on MWF HD per renal. 60 yo M pmh of HTN, DM2, ESRD (on HD MWF) presents s/p 1 EDGAR to the mid LAD for STEMI. Patient tolerated the procedure well and was discharged by cardiology on ASA, plavix, coreg, losartan and lipitor. The patient was evaluated by sonogram for a LV thrombus due to an akinetic wall and echo showed no evidence of a thrombus. The patient was noted to have elevated LFTs in the setting of hepatic congestion on sono which were trended and improved. The patient was also in DKA on admission which improved with insulin administration. Endocrinology was consulted and recommended lantus and humalog on discharge with a regimen of ______. The patient was discharged with diabetic teaching. The patient was continued on MWF HD per renal.  The patient's hospital course as complicated by a fever, upon which blood cultures showed bacteremia with Staph Aureus. The patient was seen by ID for evaluation of possible endocarditis given his recent EDGAR. The patient showed no evidence of endocarditis on TTE, and a JEAN-PIERRE showed: ____________. The patient was also evaluated for possible RP bleed given he developed L flank pain but there was no evidence of pathology on a CT of the abdomen. 60 yo M pmh of HTN, DM2, ESRD (on HD MWF) presents s/p 1 EDGAR to the mid LAD for STEMI. Patient tolerated the procedure well and was discharged by cardiology on ASA, plavix, coreg, losartan and lipitor. The patient was evaluated by sonogram for a LV thrombus due to an akinetic wall and echo showed no evidence of a thrombus. The patient was noted to have elevated LFTs in the setting of hepatic congestion on sono which were trended and improved. The patient was also in DKA on admission which improved with insulin administration. Endocrinology was consulted and recommended lantus and humalog on discharge. The patient was discharged with diabetic teaching. The patient was continued on MWF HD per renal. The patient's hospital course as complicated by a fever, upon which blood cultures showed bacteremia with Staph Aureus. The patient was seen by ID for evaluation of possible endocarditis given his recent EDGAR. The patient showed no evidence of endocarditis on TTE, and a JEAN-PIERRE showed no evidence of endocarditis. The patient was also evaluated for possible RP bleed given he developed L flank pain but there was no evidence of pathology on a CT of the abdomen. 60 yo M pmh of HTN, uncontrolled DM2(HbA1c 12), ESRD (on HD MWF) admitted with AW STEMI s/p EDGAR to LAD. Patient tolerated the procedure well and treated with ASA, plavix, coreg, losartan and lipitor. TTE showed no evidence of a thrombus. The patient was noted to have elevated LFTs in the setting of hepatic congestion on sono which were trended and improved. The patient was also in DKA on admission which improved with insulin administration. Endocrinology was consulted and recommended lantus and humalog on discharge. The patient was continued on MWF HD per renal. The patient's hospital course as complicated by a fever, found to have MSSA bacteremia of unclear source. The patient was seen by ID and treated with IV ancef. Repeat Bcx negative. No evidence of endocarditis on TTE as well as JEAN-PIERRE. The patient was also evaluated for possible RP bleed given he developed L flank pain but it was negative on CT a/p. 62 yo M pmh of HTN, uncontrolled DM2(HbA1c 12), ESRD (on HD MWF) admitted with AW STEMI s/p EDGAR to LAD. Patient tolerated the procedure well and treated with ASA, plavix, coreg, losartan and lipitor. TTE showed no evidence of a thrombus. The patient was noted to have elevated LFTs, hepatitis panel negative, Ab sono negative for acute pathology and transaminitis resolved. The patient was also in DKA on admission which improved with insulin administration. Endocrinology was consulted and recommended lantus and humalog on discharge. The patient was also evaluated for possible RP bleed given he developed L flank pain but it was negative on CT a/p. The patient was continued on M/W/F HD per renal. The patient's hospital course as complicated by a fever, found to have MSSA bacteremia of unclear source. The patient was seen by ID and treated with IV ancef. Repeat Bcx negative. No evidence of endocarditis on TTE as well as JEAN-PIERRE. Patient will continue IV ancef 2g/2g/3g with M/W/F HD through 3/1/17 per ID.

## 2017-01-15 LAB
ALBUMIN SERPL ELPH-MCNC: 3.1 G/DL — LOW (ref 3.3–5)
ALP SERPL-CCNC: 105 U/L — SIGNIFICANT CHANGE UP (ref 40–120)
ALT FLD-CCNC: 33 U/L — SIGNIFICANT CHANGE UP (ref 10–45)
ANION GAP SERPL CALC-SCNC: 20 MMOL/L — HIGH (ref 5–17)
APPEARANCE UR: ABNORMAL
AST SERPL-CCNC: 20 U/L — SIGNIFICANT CHANGE UP (ref 10–40)
BILIRUB SERPL-MCNC: 0.4 MG/DL — SIGNIFICANT CHANGE UP (ref 0.2–1.2)
BILIRUB UR-MCNC: ABNORMAL
BUN SERPL-MCNC: 64 MG/DL — HIGH (ref 7–23)
CALCIUM SERPL-MCNC: 8.4 MG/DL — SIGNIFICANT CHANGE UP (ref 8.4–10.5)
CHLORIDE SERPL-SCNC: 90 MMOL/L — LOW (ref 96–108)
CO2 SERPL-SCNC: 20 MMOL/L — LOW (ref 22–31)
COLOR SPEC: YELLOW — SIGNIFICANT CHANGE UP
CREAT SERPL-MCNC: 8.06 MG/DL — HIGH (ref 0.5–1.3)
CULTURE RESULTS: SIGNIFICANT CHANGE UP
DIFF PNL FLD: NEGATIVE — SIGNIFICANT CHANGE UP
GLUCOSE SERPL-MCNC: 219 MG/DL — HIGH (ref 70–99)
GLUCOSE UR QL: 50
GRAM STN FLD: SIGNIFICANT CHANGE UP
HCT VFR BLD CALC: 31.1 % — LOW (ref 39–50)
HGB BLD-MCNC: 10.5 G/DL — LOW (ref 13–17)
KETONES UR-MCNC: NEGATIVE — SIGNIFICANT CHANGE UP
LEUKOCYTE ESTERASE UR-ACNC: ABNORMAL
MAGNESIUM SERPL-MCNC: 1.8 MG/DL — SIGNIFICANT CHANGE UP (ref 1.6–2.6)
MCHC RBC-ENTMCNC: 30.6 PG — SIGNIFICANT CHANGE UP (ref 27–34)
MCHC RBC-ENTMCNC: 33.8 GM/DL — SIGNIFICANT CHANGE UP (ref 32–36)
MCV RBC AUTO: 90.7 FL — SIGNIFICANT CHANGE UP (ref 80–100)
NITRITE UR-MCNC: NEGATIVE — SIGNIFICANT CHANGE UP
PH UR: 5.5 — SIGNIFICANT CHANGE UP (ref 4.8–8)
PHOSPHATE SERPL-MCNC: 4.2 MG/DL — SIGNIFICANT CHANGE UP (ref 2.5–4.5)
PLATELET # BLD AUTO: 149 K/UL — LOW (ref 150–400)
POTASSIUM SERPL-MCNC: 4.2 MMOL/L — SIGNIFICANT CHANGE UP (ref 3.5–5.3)
POTASSIUM SERPL-SCNC: 4.2 MMOL/L — SIGNIFICANT CHANGE UP (ref 3.5–5.3)
PROT SERPL-MCNC: 6 G/DL — SIGNIFICANT CHANGE UP (ref 6–8.3)
PROT UR-MCNC: 150 MG/DL
RBC # BLD: 3.43 M/UL — LOW (ref 4.2–5.8)
RBC # FLD: 12.6 % — SIGNIFICANT CHANGE UP (ref 10.3–14.5)
SODIUM SERPL-SCNC: 130 MMOL/L — LOW (ref 135–145)
SP GR SPEC: 1.02 — SIGNIFICANT CHANGE UP (ref 1.01–1.02)
SPECIMEN SOURCE: SIGNIFICANT CHANGE UP
UROBILINOGEN FLD QL: NEGATIVE — SIGNIFICANT CHANGE UP
WBC # BLD: 11.27 K/UL — HIGH (ref 3.8–10.5)
WBC # FLD AUTO: 11.27 K/UL — HIGH (ref 3.8–10.5)

## 2017-01-15 PROCEDURE — 99232 SBSQ HOSP IP/OBS MODERATE 35: CPT

## 2017-01-15 RX ORDER — INSULIN GLARGINE 100 [IU]/ML
18 INJECTION, SOLUTION SUBCUTANEOUS EVERY MORNING
Qty: 0 | Refills: 0 | Status: DISCONTINUED | OUTPATIENT
Start: 2017-01-15 | End: 2017-01-19

## 2017-01-15 RX ORDER — MORPHINE SULFATE 50 MG/1
1 CAPSULE, EXTENDED RELEASE ORAL ONCE
Qty: 0 | Refills: 0 | Status: DISCONTINUED | OUTPATIENT
Start: 2017-01-15 | End: 2017-01-15

## 2017-01-15 RX ORDER — MORPHINE SULFATE 50 MG/1
1 CAPSULE, EXTENDED RELEASE ORAL EVERY 6 HOURS
Qty: 0 | Refills: 0 | Status: DISCONTINUED | OUTPATIENT
Start: 2017-01-15 | End: 2017-01-15

## 2017-01-15 RX ORDER — VANCOMYCIN HCL 1 G
1000 VIAL (EA) INTRAVENOUS ONCE
Qty: 0 | Refills: 0 | Status: COMPLETED | OUTPATIENT
Start: 2017-01-15 | End: 2017-01-15

## 2017-01-15 RX ORDER — CEFTRIAXONE 500 MG/1
INJECTION, POWDER, FOR SOLUTION INTRAMUSCULAR; INTRAVENOUS
Qty: 0 | Refills: 0 | Status: DISCONTINUED | OUTPATIENT
Start: 2017-01-15 | End: 2017-01-15

## 2017-01-15 RX ORDER — PIPERACILLIN AND TAZOBACTAM 4; .5 G/20ML; G/20ML
3.38 INJECTION, POWDER, LYOPHILIZED, FOR SOLUTION INTRAVENOUS EVERY 12 HOURS
Qty: 0 | Refills: 0 | Status: DISCONTINUED | OUTPATIENT
Start: 2017-01-15 | End: 2017-01-17

## 2017-01-15 RX ORDER — CEFTRIAXONE 500 MG/1
1 INJECTION, POWDER, FOR SOLUTION INTRAMUSCULAR; INTRAVENOUS ONCE
Qty: 0 | Refills: 0 | Status: COMPLETED | OUTPATIENT
Start: 2017-01-15 | End: 2017-01-15

## 2017-01-15 RX ORDER — INSULIN LISPRO 100/ML
6 VIAL (ML) SUBCUTANEOUS
Qty: 0 | Refills: 0 | Status: DISCONTINUED | OUTPATIENT
Start: 2017-01-15 | End: 2017-01-17

## 2017-01-15 RX ORDER — ACETAMINOPHEN 500 MG
650 TABLET ORAL EVERY 6 HOURS
Qty: 0 | Refills: 0 | Status: DISCONTINUED | OUTPATIENT
Start: 2017-01-15 | End: 2017-01-17

## 2017-01-15 RX ORDER — PIPERACILLIN AND TAZOBACTAM 4; .5 G/20ML; G/20ML
3.38 INJECTION, POWDER, LYOPHILIZED, FOR SOLUTION INTRAVENOUS ONCE
Qty: 0 | Refills: 0 | Status: COMPLETED | OUTPATIENT
Start: 2017-01-15 | End: 2017-01-15

## 2017-01-15 RX ADMIN — CLOPIDOGREL BISULFATE 75 MILLIGRAM(S): 75 TABLET, FILM COATED ORAL at 12:10

## 2017-01-15 RX ADMIN — CARVEDILOL PHOSPHATE 6.25 MILLIGRAM(S): 80 CAPSULE, EXTENDED RELEASE ORAL at 17:02

## 2017-01-15 RX ADMIN — HEPARIN SODIUM 5000 UNIT(S): 5000 INJECTION INTRAVENOUS; SUBCUTANEOUS at 12:09

## 2017-01-15 RX ADMIN — MORPHINE SULFATE 1 MILLIGRAM(S): 50 CAPSULE, EXTENDED RELEASE ORAL at 16:35

## 2017-01-15 RX ADMIN — MORPHINE SULFATE 1 MILLIGRAM(S): 50 CAPSULE, EXTENDED RELEASE ORAL at 21:33

## 2017-01-15 RX ADMIN — MORPHINE SULFATE 1 MILLIGRAM(S): 50 CAPSULE, EXTENDED RELEASE ORAL at 16:24

## 2017-01-15 RX ADMIN — INSULIN GLARGINE 15 UNIT(S): 100 INJECTION, SOLUTION SUBCUTANEOUS at 08:46

## 2017-01-15 RX ADMIN — Medication 5: at 12:56

## 2017-01-15 RX ADMIN — PIPERACILLIN AND TAZOBACTAM 200 GRAM(S): 4; .5 INJECTION, POWDER, LYOPHILIZED, FOR SOLUTION INTRAVENOUS at 21:20

## 2017-01-15 RX ADMIN — Medication 3: at 08:45

## 2017-01-15 RX ADMIN — ATORVASTATIN CALCIUM 40 MILLIGRAM(S): 80 TABLET, FILM COATED ORAL at 21:19

## 2017-01-15 RX ADMIN — Medication 6 UNIT(S): at 17:01

## 2017-01-15 RX ADMIN — HEPARIN SODIUM 5000 UNIT(S): 5000 INJECTION INTRAVENOUS; SUBCUTANEOUS at 21:19

## 2017-01-15 RX ADMIN — Medication 81 MILLIGRAM(S): at 12:10

## 2017-01-15 RX ADMIN — CEFTRIAXONE 100 GRAM(S): 500 INJECTION, POWDER, FOR SOLUTION INTRAMUSCULAR; INTRAVENOUS at 12:09

## 2017-01-15 RX ADMIN — Medication 650 MILLIGRAM(S): at 16:00

## 2017-01-15 RX ADMIN — Medication 250 MILLIGRAM(S): at 20:56

## 2017-01-15 RX ADMIN — Medication 5: at 17:02

## 2017-01-15 RX ADMIN — Medication 4 UNIT(S): at 12:57

## 2017-01-15 RX ADMIN — Medication 4 UNIT(S): at 08:45

## 2017-01-15 RX ADMIN — MORPHINE SULFATE 1 MILLIGRAM(S): 50 CAPSULE, EXTENDED RELEASE ORAL at 21:17

## 2017-01-15 RX ADMIN — Medication 650 MILLIGRAM(S): at 15:28

## 2017-01-15 NOTE — PROVIDER CONTACT NOTE (OTHER) - ACTION/TREATMENT ORDERED:
labs ordered  rvp ordered  Tylenol 650mg x1 ordered.  wi continue to monitor pt. labs ordered,  Tylenol 650mg x1 ordered.  will continue to monitor pt.

## 2017-01-15 NOTE — PROVIDER CONTACT NOTE (OTHER) - BACKGROUND
patient had fever yesterday, urine culture showed uti, pt is on iv antibiotic,first dose ceftriaxone gave today patient had fever yesterday, urine culture showed uti, pt is on iv antibiotic, first dose ceftriaxone gave today

## 2017-01-15 NOTE — PROVIDER CONTACT NOTE (OTHER) - SITUATION
patient c/o admitted with chest pain,renal failure on dialysis patient c/o admitted with chest pain, renal failure on dialysis

## 2017-01-16 LAB
ANION GAP SERPL CALC-SCNC: 23 MMOL/L — HIGH (ref 5–17)
BASOPHILS # BLD AUTO: 0 K/UL — SIGNIFICANT CHANGE UP (ref 0–0.2)
BASOPHILS NFR BLD AUTO: 0 % — SIGNIFICANT CHANGE UP (ref 0–2)
BLD GP AB SCN SERPL QL: NEGATIVE — SIGNIFICANT CHANGE UP
BUN SERPL-MCNC: 81 MG/DL — HIGH (ref 7–23)
CALCIUM SERPL-MCNC: 8.3 MG/DL — LOW (ref 8.4–10.5)
CHLORIDE SERPL-SCNC: 89 MMOL/L — LOW (ref 96–108)
CO2 SERPL-SCNC: 18 MMOL/L — LOW (ref 22–31)
CREAT SERPL-MCNC: 9.22 MG/DL — HIGH (ref 0.5–1.3)
EOSINOPHIL # BLD AUTO: 0.83 K/UL — HIGH (ref 0–0.5)
EOSINOPHIL NFR BLD AUTO: 11 % — HIGH (ref 0–6)
FERRITIN SERPL-MCNC: 480.1 NG/ML — HIGH (ref 30–400)
FOLATE SERPL-MCNC: >20 NG/ML — SIGNIFICANT CHANGE UP (ref 4.8–24.2)
GLUCOSE SERPL-MCNC: 179 MG/DL — HIGH (ref 70–99)
HCT VFR BLD CALC: 28.2 % — LOW (ref 39–50)
HCT VFR BLD CALC: 28.3 % — LOW (ref 39–50)
HGB BLD-MCNC: 9.4 G/DL — LOW (ref 13–17)
HGB BLD-MCNC: 9.6 G/DL — LOW (ref 13–17)
IMM GRANULOCYTES NFR BLD AUTO: 0.3 % — SIGNIFICANT CHANGE UP (ref 0–1.5)
IRON SATN MFR SERPL: 23 % — SIGNIFICANT CHANGE UP (ref 16–55)
IRON SATN MFR SERPL: 37 UG/DL — LOW (ref 45–165)
LYMPHOCYTES # BLD AUTO: 0.65 K/UL — LOW (ref 1–3.3)
LYMPHOCYTES # BLD AUTO: 8.6 % — LOW (ref 13–44)
MAGNESIUM SERPL-MCNC: 2 MG/DL — SIGNIFICANT CHANGE UP (ref 1.6–2.6)
MCHC RBC-ENTMCNC: 30 PG — SIGNIFICANT CHANGE UP (ref 27–34)
MCHC RBC-ENTMCNC: 30.9 PG — SIGNIFICANT CHANGE UP (ref 27–34)
MCHC RBC-ENTMCNC: 33.2 GM/DL — SIGNIFICANT CHANGE UP (ref 32–36)
MCHC RBC-ENTMCNC: 34.1 GM/DL — SIGNIFICANT CHANGE UP (ref 32–36)
MCV RBC AUTO: 90.4 FL — SIGNIFICANT CHANGE UP (ref 80–100)
MCV RBC AUTO: 90.6 FL — SIGNIFICANT CHANGE UP (ref 80–100)
MONOCYTES # BLD AUTO: 0.94 K/UL — HIGH (ref 0–0.9)
MONOCYTES NFR BLD AUTO: 12.5 % — SIGNIFICANT CHANGE UP (ref 2–14)
NEUTROPHILS # BLD AUTO: 5.11 K/UL — SIGNIFICANT CHANGE UP (ref 1.8–7.4)
NEUTROPHILS NFR BLD AUTO: 67.6 % — SIGNIFICANT CHANGE UP (ref 43–77)
PHOSPHATE SERPL-MCNC: 5.2 MG/DL — HIGH (ref 2.5–4.5)
PLATELET # BLD AUTO: 132 K/UL — LOW (ref 150–400)
PLATELET # BLD AUTO: 147 K/UL — LOW (ref 150–400)
POTASSIUM SERPL-MCNC: 4.1 MMOL/L — SIGNIFICANT CHANGE UP (ref 3.5–5.3)
POTASSIUM SERPL-SCNC: 4.1 MMOL/L — SIGNIFICANT CHANGE UP (ref 3.5–5.3)
RBC # BLD: 3.11 M/UL — LOW (ref 4.2–5.8)
RBC # BLD: 3.13 M/UL — LOW (ref 4.2–5.8)
RBC # FLD: 11.9 % — SIGNIFICANT CHANGE UP (ref 10.3–14.5)
RBC # FLD: 12.6 % — SIGNIFICANT CHANGE UP (ref 10.3–14.5)
RH IG SCN BLD-IMP: POSITIVE — SIGNIFICANT CHANGE UP
SODIUM SERPL-SCNC: 130 MMOL/L — LOW (ref 135–145)
TIBC SERPL-MCNC: 158 UG/DL — LOW (ref 220–430)
UIBC SERPL-MCNC: 121 UG/DL — SIGNIFICANT CHANGE UP (ref 110–370)
VANCOMYCIN TROUGH SERPL-MCNC: 5.6 UG/ML — LOW (ref 10–20)
VIT B12 SERPL-MCNC: 1944 PG/ML — HIGH (ref 243–894)
WBC # BLD: 5.5 K/UL — SIGNIFICANT CHANGE UP (ref 3.8–10.5)
WBC # BLD: 7.55 K/UL — SIGNIFICANT CHANGE UP (ref 3.8–10.5)
WBC # FLD AUTO: 5.5 K/UL — SIGNIFICANT CHANGE UP (ref 3.8–10.5)
WBC # FLD AUTO: 7.55 K/UL — SIGNIFICANT CHANGE UP (ref 3.8–10.5)

## 2017-01-16 PROCEDURE — 99233 SBSQ HOSP IP/OBS HIGH 50: CPT | Mod: GC

## 2017-01-16 PROCEDURE — 74176 CT ABD & PELVIS W/O CONTRAST: CPT | Mod: 26

## 2017-01-16 RX ORDER — VANCOMYCIN HCL 1 G
1250 VIAL (EA) INTRAVENOUS ONCE
Qty: 0 | Refills: 0 | Status: COMPLETED | OUTPATIENT
Start: 2017-01-16 | End: 2017-01-17

## 2017-01-16 RX ORDER — ERYTHROPOIETIN 10000 [IU]/ML
10000 INJECTION, SOLUTION INTRAVENOUS; SUBCUTANEOUS EVERY OTHER DAY
Qty: 0 | Refills: 0 | Status: DISCONTINUED | OUTPATIENT
Start: 2017-01-16 | End: 2017-01-24

## 2017-01-16 RX ADMIN — PIPERACILLIN AND TAZOBACTAM 25 GRAM(S): 4; .5 INJECTION, POWDER, LYOPHILIZED, FOR SOLUTION INTRAVENOUS at 22:05

## 2017-01-16 RX ADMIN — ERYTHROPOIETIN 10000 UNIT(S): 10000 INJECTION, SOLUTION INTRAVENOUS; SUBCUTANEOUS at 18:14

## 2017-01-16 RX ADMIN — Medication 1: at 08:09

## 2017-01-16 RX ADMIN — Medication 6 UNIT(S): at 21:25

## 2017-01-16 RX ADMIN — INSULIN GLARGINE 18 UNIT(S): 100 INJECTION, SOLUTION SUBCUTANEOUS at 08:17

## 2017-01-16 RX ADMIN — CARVEDILOL PHOSPHATE 6.25 MILLIGRAM(S): 80 CAPSULE, EXTENDED RELEASE ORAL at 21:26

## 2017-01-16 RX ADMIN — ATORVASTATIN CALCIUM 40 MILLIGRAM(S): 80 TABLET, FILM COATED ORAL at 21:26

## 2017-01-16 RX ADMIN — CLOPIDOGREL BISULFATE 75 MILLIGRAM(S): 75 TABLET, FILM COATED ORAL at 13:17

## 2017-01-16 RX ADMIN — LOSARTAN POTASSIUM 50 MILLIGRAM(S): 100 TABLET, FILM COATED ORAL at 06:56

## 2017-01-16 RX ADMIN — Medication 81 MILLIGRAM(S): at 13:17

## 2017-01-16 RX ADMIN — Medication 6 UNIT(S): at 08:10

## 2017-01-16 RX ADMIN — Medication 3: at 13:17

## 2017-01-16 RX ADMIN — CARVEDILOL PHOSPHATE 6.25 MILLIGRAM(S): 80 CAPSULE, EXTENDED RELEASE ORAL at 06:56

## 2017-01-16 RX ADMIN — HEPARIN SODIUM 5000 UNIT(S): 5000 INJECTION INTRAVENOUS; SUBCUTANEOUS at 21:26

## 2017-01-16 RX ADMIN — HEPARIN SODIUM 5000 UNIT(S): 5000 INJECTION INTRAVENOUS; SUBCUTANEOUS at 13:17

## 2017-01-16 RX ADMIN — PIPERACILLIN AND TAZOBACTAM 25 GRAM(S): 4; .5 INJECTION, POWDER, LYOPHILIZED, FOR SOLUTION INTRAVENOUS at 08:10

## 2017-01-16 RX ADMIN — Medication 6 UNIT(S): at 13:17

## 2017-01-16 NOTE — PROVIDER CONTACT NOTE (CRITICAL VALUE NOTIFICATION) - SITUATION
Serum glucose 871
ABG glucose 683
Blood culture drawn on 1/14 resulted in growth in anaerobic bottle with gram positive cocci in clusters
PATIENT ADMITTED WITH NSTEMI,ESRD,ON DIALYSIS

## 2017-01-17 LAB
-  AMIKACIN: SIGNIFICANT CHANGE UP
-  AMPICILLIN/SULBACTAM: SIGNIFICANT CHANGE UP
-  AMPICILLIN/SULBACTAM: SIGNIFICANT CHANGE UP
-  AMPICILLIN: SIGNIFICANT CHANGE UP
-  AZTREONAM: SIGNIFICANT CHANGE UP
-  CEFAZOLIN: SIGNIFICANT CHANGE UP
-  CEFAZOLIN: SIGNIFICANT CHANGE UP
-  CEFEPIME: SIGNIFICANT CHANGE UP
-  CEFOXITIN: SIGNIFICANT CHANGE UP
-  CEFTAZIDIME: SIGNIFICANT CHANGE UP
-  CEFTRIAXONE: SIGNIFICANT CHANGE UP
-  CIPROFLOXACIN: SIGNIFICANT CHANGE UP
-  CIPROFLOXACIN: SIGNIFICANT CHANGE UP
-  CLINDAMYCIN: SIGNIFICANT CHANGE UP
-  ERTAPENEM: SIGNIFICANT CHANGE UP
-  ERYTHROMYCIN: SIGNIFICANT CHANGE UP
-  GENTAMICIN: SIGNIFICANT CHANGE UP
-  GENTAMICIN: SIGNIFICANT CHANGE UP
-  IMIPENEM: SIGNIFICANT CHANGE UP
-  LEVOFLOXACIN: SIGNIFICANT CHANGE UP
-  LEVOFLOXACIN: SIGNIFICANT CHANGE UP
-  MEROPENEM: SIGNIFICANT CHANGE UP
-  MOXIFLOXACIN(AEROBIC): SIGNIFICANT CHANGE UP
-  NITROFURANTOIN: SIGNIFICANT CHANGE UP
-  OXACILLIN: SIGNIFICANT CHANGE UP
-  PENICILLIN: SIGNIFICANT CHANGE UP
-  PIPERACILLIN/TAZOBACTAM: SIGNIFICANT CHANGE UP
-  RIFAMPIN: SIGNIFICANT CHANGE UP
-  TETRACYCLINE: SIGNIFICANT CHANGE UP
-  TOBRAMYCIN: SIGNIFICANT CHANGE UP
-  TRIMETHOPRIM/SULFAMETHOXAZOLE: SIGNIFICANT CHANGE UP
-  TRIMETHOPRIM/SULFAMETHOXAZOLE: SIGNIFICANT CHANGE UP
-  VANCOMYCIN: SIGNIFICANT CHANGE UP
ANION GAP SERPL CALC-SCNC: 13 MMOL/L — SIGNIFICANT CHANGE UP (ref 5–17)
BLD GP AB SCN SERPL QL: NEGATIVE — SIGNIFICANT CHANGE UP
BUN SERPL-MCNC: 44 MG/DL — HIGH (ref 7–23)
CALCIUM SERPL-MCNC: 7.9 MG/DL — LOW (ref 8.4–10.5)
CHLORIDE SERPL-SCNC: 91 MMOL/L — LOW (ref 96–108)
CO2 SERPL-SCNC: 26 MMOL/L — SIGNIFICANT CHANGE UP (ref 22–31)
CREAT SERPL-MCNC: 6.02 MG/DL — HIGH (ref 0.5–1.3)
CULTURE RESULTS: SIGNIFICANT CHANGE UP
GLUCOSE SERPL-MCNC: 203 MG/DL — HIGH (ref 70–99)
GRAM STN FLD: SIGNIFICANT CHANGE UP
HCT VFR BLD CALC: 28.6 % — LOW (ref 39–50)
HGB BLD-MCNC: 9.5 G/DL — LOW (ref 13–17)
MAGNESIUM SERPL-MCNC: 1.8 MG/DL — SIGNIFICANT CHANGE UP (ref 1.6–2.6)
MCHC RBC-ENTMCNC: 29.9 PG — SIGNIFICANT CHANGE UP (ref 27–34)
MCHC RBC-ENTMCNC: 33.2 GM/DL — SIGNIFICANT CHANGE UP (ref 32–36)
MCV RBC AUTO: 89.9 FL — SIGNIFICANT CHANGE UP (ref 80–100)
METHOD TYPE: SIGNIFICANT CHANGE UP
METHOD TYPE: SIGNIFICANT CHANGE UP
ORGANISM # SPEC MICROSCOPIC CNT: SIGNIFICANT CHANGE UP
PHOSPHATE SERPL-MCNC: 4.2 MG/DL — SIGNIFICANT CHANGE UP (ref 2.5–4.5)
PLATELET # BLD AUTO: 157 K/UL — SIGNIFICANT CHANGE UP (ref 150–400)
POTASSIUM SERPL-MCNC: 4.2 MMOL/L — SIGNIFICANT CHANGE UP (ref 3.5–5.3)
POTASSIUM SERPL-SCNC: 4.2 MMOL/L — SIGNIFICANT CHANGE UP (ref 3.5–5.3)
RBC # BLD: 3.18 M/UL — LOW (ref 4.2–5.8)
RBC # FLD: 12.5 % — SIGNIFICANT CHANGE UP (ref 10.3–14.5)
RH IG SCN BLD-IMP: POSITIVE — SIGNIFICANT CHANGE UP
SODIUM SERPL-SCNC: 130 MMOL/L — LOW (ref 135–145)
SPECIMEN SOURCE: SIGNIFICANT CHANGE UP
WBC # BLD: 5.88 K/UL — SIGNIFICANT CHANGE UP (ref 3.8–10.5)
WBC # FLD AUTO: 5.88 K/UL — SIGNIFICANT CHANGE UP (ref 3.8–10.5)

## 2017-01-17 PROCEDURE — 99233 SBSQ HOSP IP/OBS HIGH 50: CPT | Mod: GC

## 2017-01-17 PROCEDURE — 99222 1ST HOSP IP/OBS MODERATE 55: CPT | Mod: GC

## 2017-01-17 PROCEDURE — 99232 SBSQ HOSP IP/OBS MODERATE 35: CPT

## 2017-01-17 RX ORDER — ACETAMINOPHEN 500 MG
650 TABLET ORAL EVERY 6 HOURS
Qty: 0 | Refills: 0 | Status: DISCONTINUED | OUTPATIENT
Start: 2017-01-17 | End: 2017-01-24

## 2017-01-17 RX ORDER — INSULIN LISPRO 100/ML
4 VIAL (ML) SUBCUTANEOUS
Qty: 0 | Refills: 0 | Status: DISCONTINUED | OUTPATIENT
Start: 2017-01-17 | End: 2017-01-19

## 2017-01-17 RX ADMIN — INSULIN GLARGINE 18 UNIT(S): 100 INJECTION, SOLUTION SUBCUTANEOUS at 08:16

## 2017-01-17 RX ADMIN — ATORVASTATIN CALCIUM 40 MILLIGRAM(S): 80 TABLET, FILM COATED ORAL at 20:58

## 2017-01-17 RX ADMIN — Medication 81 MILLIGRAM(S): at 12:19

## 2017-01-17 RX ADMIN — CLOPIDOGREL BISULFATE 75 MILLIGRAM(S): 75 TABLET, FILM COATED ORAL at 12:19

## 2017-01-17 RX ADMIN — PIPERACILLIN AND TAZOBACTAM 25 GRAM(S): 4; .5 INJECTION, POWDER, LYOPHILIZED, FOR SOLUTION INTRAVENOUS at 08:18

## 2017-01-17 RX ADMIN — Medication 6 UNIT(S): at 08:18

## 2017-01-17 RX ADMIN — Medication 2: at 12:18

## 2017-01-17 RX ADMIN — Medication 1: at 08:18

## 2017-01-17 RX ADMIN — Medication 6 UNIT(S): at 12:18

## 2017-01-17 RX ADMIN — Medication 166.67 MILLIGRAM(S): at 01:18

## 2017-01-17 RX ADMIN — HEPARIN SODIUM 5000 UNIT(S): 5000 INJECTION INTRAVENOUS; SUBCUTANEOUS at 20:58

## 2017-01-17 RX ADMIN — Medication 6 UNIT(S): at 18:21

## 2017-01-18 LAB
ANION GAP SERPL CALC-SCNC: 18 MMOL/L — HIGH (ref 5–17)
BUN SERPL-MCNC: 54 MG/DL — HIGH (ref 7–23)
CALCIUM SERPL-MCNC: 7.8 MG/DL — LOW (ref 8.4–10.5)
CHLORIDE SERPL-SCNC: 87 MMOL/L — LOW (ref 96–108)
CO2 SERPL-SCNC: 21 MMOL/L — LOW (ref 22–31)
CREAT ?TM UR-MCNC: 240 MG/DL — SIGNIFICANT CHANGE UP
CREAT SERPL-MCNC: 7.32 MG/DL — HIGH (ref 0.5–1.3)
GLUCOSE SERPL-MCNC: 306 MG/DL — HIGH (ref 70–99)
HCT VFR BLD CALC: 28.8 % — LOW (ref 39–50)
HGB BLD-MCNC: 9.3 G/DL — LOW (ref 13–17)
MAGNESIUM SERPL-MCNC: 2 MG/DL — SIGNIFICANT CHANGE UP (ref 1.6–2.6)
MCHC RBC-ENTMCNC: 29.2 PG — SIGNIFICANT CHANGE UP (ref 27–34)
MCHC RBC-ENTMCNC: 32.3 GM/DL — SIGNIFICANT CHANGE UP (ref 32–36)
MCV RBC AUTO: 90.6 FL — SIGNIFICANT CHANGE UP (ref 80–100)
OSMOLALITY SERPL: 297 MOS/KG — SIGNIFICANT CHANGE UP (ref 275–300)
OSMOLALITY UR: 297 MOS/KG — LOW (ref 300–900)
PLATELET # BLD AUTO: 156 K/UL — SIGNIFICANT CHANGE UP (ref 150–400)
POTASSIUM SERPL-MCNC: 4.2 MMOL/L — SIGNIFICANT CHANGE UP (ref 3.5–5.3)
POTASSIUM SERPL-SCNC: 4.2 MMOL/L — SIGNIFICANT CHANGE UP (ref 3.5–5.3)
RBC # BLD: 3.18 M/UL — LOW (ref 4.2–5.8)
RBC # FLD: 12.6 % — SIGNIFICANT CHANGE UP (ref 10.3–14.5)
SODIUM SERPL-SCNC: 126 MMOL/L — LOW (ref 135–145)
SODIUM UR-SCNC: 20 MMOL/L — SIGNIFICANT CHANGE UP
WBC # BLD: 5.84 K/UL — SIGNIFICANT CHANGE UP (ref 3.8–10.5)
WBC # FLD AUTO: 5.84 K/UL — SIGNIFICANT CHANGE UP (ref 3.8–10.5)

## 2017-01-18 PROCEDURE — 99233 SBSQ HOSP IP/OBS HIGH 50: CPT

## 2017-01-18 PROCEDURE — 99233 SBSQ HOSP IP/OBS HIGH 50: CPT | Mod: GC

## 2017-01-18 RX ORDER — CEFAZOLIN SODIUM 1 G
1000 VIAL (EA) INJECTION EVERY 24 HOURS
Qty: 0 | Refills: 0 | Status: DISCONTINUED | OUTPATIENT
Start: 2017-01-18 | End: 2017-01-24

## 2017-01-18 RX ORDER — CEFAZOLIN SODIUM 1 G
1000 VIAL (EA) INJECTION EVERY 24 HOURS
Qty: 0 | Refills: 0 | Status: DISCONTINUED | OUTPATIENT
Start: 2017-01-18 | End: 2017-01-18

## 2017-01-18 RX ADMIN — Medication 4: at 12:24

## 2017-01-18 RX ADMIN — Medication 3: at 08:15

## 2017-01-18 RX ADMIN — HEPARIN SODIUM 5000 UNIT(S): 5000 INJECTION INTRAVENOUS; SUBCUTANEOUS at 12:26

## 2017-01-18 RX ADMIN — Medication 100 MILLIGRAM(S): at 22:18

## 2017-01-18 RX ADMIN — Medication 4 UNIT(S): at 17:16

## 2017-01-18 RX ADMIN — Medication 4 UNIT(S): at 12:24

## 2017-01-18 RX ADMIN — LOSARTAN POTASSIUM 50 MILLIGRAM(S): 100 TABLET, FILM COATED ORAL at 05:58

## 2017-01-18 RX ADMIN — Medication 650 MILLIGRAM(S): at 14:43

## 2017-01-18 RX ADMIN — Medication 100 MILLIGRAM(S): at 17:41

## 2017-01-18 RX ADMIN — ERYTHROPOIETIN 10000 UNIT(S): 10000 INJECTION, SOLUTION INTRAVENOUS; SUBCUTANEOUS at 13:38

## 2017-01-18 RX ADMIN — HEPARIN SODIUM 5000 UNIT(S): 5000 INJECTION INTRAVENOUS; SUBCUTANEOUS at 21:19

## 2017-01-18 RX ADMIN — CLOPIDOGREL BISULFATE 75 MILLIGRAM(S): 75 TABLET, FILM COATED ORAL at 12:26

## 2017-01-18 RX ADMIN — Medication 81 MILLIGRAM(S): at 12:26

## 2017-01-18 RX ADMIN — Medication 4 UNIT(S): at 08:15

## 2017-01-18 RX ADMIN — CARVEDILOL PHOSPHATE 6.25 MILLIGRAM(S): 80 CAPSULE, EXTENDED RELEASE ORAL at 05:58

## 2017-01-18 RX ADMIN — Medication 650 MILLIGRAM(S): at 14:13

## 2017-01-18 RX ADMIN — INSULIN GLARGINE 18 UNIT(S): 100 INJECTION, SOLUTION SUBCUTANEOUS at 08:19

## 2017-01-18 RX ADMIN — CARVEDILOL PHOSPHATE 6.25 MILLIGRAM(S): 80 CAPSULE, EXTENDED RELEASE ORAL at 17:42

## 2017-01-18 RX ADMIN — ATORVASTATIN CALCIUM 40 MILLIGRAM(S): 80 TABLET, FILM COATED ORAL at 21:19

## 2017-01-18 NOTE — PROVIDER CONTACT NOTE (CRITICAL VALUE NOTIFICATION) - TEST AND RESULT REPORTED:
Blood culture drawn on 1/14 resulted as growth in anaerobic bottle with gram positive cocci in clusters
blood culture from 1/16/17 growth in aerobic bottle gram positive cocci in clusters
blood culture preliminary result is positive for aerobic and anarobic gram positive cocci and clustures
growth in anaerobic bottle gram positive cocci in clusters
ABG glucose 683
Serum glucose 871

## 2017-01-18 NOTE — PROVIDER CONTACT NOTE (CRITICAL VALUE NOTIFICATION) - ASSESSMENT
patient afebrile  vs stable
patient vs stable
ALERT ORIENT #3,VS Stable, no FEVER TODAY
Patient A+O x4; V/S stable; denies chest pain/ SOB.
Patient A+O x4; V/S stable; denies chest pain/ SOB.
pt axox3, vss, no sob or vomiting noted.no chest pain noted.

## 2017-01-18 NOTE — PROVIDER CONTACT NOTE (CRITICAL VALUE NOTIFICATION) - ACTION/TREATMENT ORDERED:
as per md as patient is dialysis .vanco trough after tomorrow dialysis , dayteam decide about iv abs
pt on iv a/b  will continue to monitor pt.
tomorrow after dialysis they will check vanco trough abd decide
Start insulin gtts
VANCOMYCIN STAT DOSE NOW
start insulin gtts

## 2017-01-18 NOTE — PROVIDER CONTACT NOTE (CRITICAL VALUE NOTIFICATION) - BACKGROUND
admitted with stemi ,ckd, dm, on dialysis mon ,wed,friday
admitted with stemi dka, ckd on dialysis
DKA
DKA
Dx: STEMI, DKA
PATIENT HAD FEVER Yesterday, positive UTI .ON CEFTRIAXONE

## 2017-01-18 NOTE — PROVIDER CONTACT NOTE (CRITICAL VALUE NOTIFICATION) - PERSON GIVING RESULT:
Laura Rodriguez/core lab
Tonia Flores
core lab/pernell tovar
iqra gregory
Blaze Mancini
Rosemarie Londono

## 2017-01-18 NOTE — PROVIDER CONTACT NOTE (CRITICAL VALUE NOTIFICATION) - RECOMMENDATIONS
as per md patient aLLREADY GOT 1250MG VANCO 1/17 AT 02 AM
patient  was on abs iv zosyn till 1/17/17 dcd by md. also got vanco 1250 mg 1/17/17 at 02 am
Start insulin gtts
VANCOMYCIN
start insulin gtts

## 2017-01-19 LAB
ANION GAP SERPL CALC-SCNC: 16 MMOL/L — SIGNIFICANT CHANGE UP (ref 5–17)
BUN SERPL-MCNC: 32 MG/DL — HIGH (ref 7–23)
CALCIUM SERPL-MCNC: 8 MG/DL — LOW (ref 8.4–10.5)
CHLORIDE SERPL-SCNC: 94 MMOL/L — LOW (ref 96–108)
CO2 SERPL-SCNC: 23 MMOL/L — SIGNIFICANT CHANGE UP (ref 22–31)
CREAT SERPL-MCNC: 5.29 MG/DL — HIGH (ref 0.5–1.3)
CULTURE RESULTS: SIGNIFICANT CHANGE UP
GLUCOSE SERPL-MCNC: 264 MG/DL — HIGH (ref 70–99)
HCT VFR BLD CALC: 28.7 % — LOW (ref 39–50)
HGB BLD-MCNC: 9.1 G/DL — LOW (ref 13–17)
MAGNESIUM SERPL-MCNC: 2 MG/DL — SIGNIFICANT CHANGE UP (ref 1.6–2.6)
MCHC RBC-ENTMCNC: 29.3 PG — SIGNIFICANT CHANGE UP (ref 27–34)
MCHC RBC-ENTMCNC: 31.7 GM/DL — LOW (ref 32–36)
MCV RBC AUTO: 92.3 FL — SIGNIFICANT CHANGE UP (ref 80–100)
PHOSPHATE SERPL-MCNC: 4.6 MG/DL — HIGH (ref 2.5–4.5)
PLATELET # BLD AUTO: 170 K/UL — SIGNIFICANT CHANGE UP (ref 150–400)
POTASSIUM SERPL-MCNC: 4.7 MMOL/L — SIGNIFICANT CHANGE UP (ref 3.5–5.3)
POTASSIUM SERPL-SCNC: 4.7 MMOL/L — SIGNIFICANT CHANGE UP (ref 3.5–5.3)
RBC # BLD: 3.11 M/UL — LOW (ref 4.2–5.8)
RBC # FLD: 12.7 % — SIGNIFICANT CHANGE UP (ref 10.3–14.5)
SODIUM SERPL-SCNC: 133 MMOL/L — LOW (ref 135–145)
SPECIMEN SOURCE: SIGNIFICANT CHANGE UP
WBC # BLD: 6.22 K/UL — SIGNIFICANT CHANGE UP (ref 3.8–10.5)
WBC # FLD AUTO: 6.22 K/UL — SIGNIFICANT CHANGE UP (ref 3.8–10.5)

## 2017-01-19 PROCEDURE — 99233 SBSQ HOSP IP/OBS HIGH 50: CPT

## 2017-01-19 PROCEDURE — 99232 SBSQ HOSP IP/OBS MODERATE 35: CPT

## 2017-01-19 PROCEDURE — 99233 SBSQ HOSP IP/OBS HIGH 50: CPT | Mod: GC

## 2017-01-19 PROCEDURE — 76882 US LMTD JT/FCL EVL NVASC XTR: CPT | Mod: 26,76,RT

## 2017-01-19 PROCEDURE — 93306 TTE W/DOPPLER COMPLETE: CPT | Mod: 26

## 2017-01-19 RX ORDER — INSULIN GLARGINE 100 [IU]/ML
20 INJECTION, SOLUTION SUBCUTANEOUS EVERY MORNING
Qty: 0 | Refills: 0 | Status: DISCONTINUED | OUTPATIENT
Start: 2017-01-19 | End: 2017-01-19

## 2017-01-19 RX ORDER — INSULIN LISPRO 100/ML
6 VIAL (ML) SUBCUTANEOUS
Qty: 0 | Refills: 0 | Status: DISCONTINUED | OUTPATIENT
Start: 2017-01-19 | End: 2017-01-20

## 2017-01-19 RX ORDER — INSULIN GLARGINE 100 [IU]/ML
18 INJECTION, SOLUTION SUBCUTANEOUS EVERY MORNING
Qty: 0 | Refills: 0 | Status: DISCONTINUED | OUTPATIENT
Start: 2017-01-20 | End: 2017-01-22

## 2017-01-19 RX ORDER — INSULIN LISPRO 100/ML
5 VIAL (ML) SUBCUTANEOUS
Qty: 0 | Refills: 0 | Status: DISCONTINUED | OUTPATIENT
Start: 2017-01-19 | End: 2017-01-19

## 2017-01-19 RX ORDER — INSULIN LISPRO 100/ML
5 VIAL (ML) SUBCUTANEOUS
Qty: 0 | Refills: 0 | Status: DISCONTINUED | OUTPATIENT
Start: 2017-01-19 | End: 2017-01-24

## 2017-01-19 RX ORDER — INSULIN LISPRO 100/ML
VIAL (ML) SUBCUTANEOUS AT BEDTIME
Qty: 0 | Refills: 0 | Status: DISCONTINUED | OUTPATIENT
Start: 2017-01-19 | End: 2017-01-24

## 2017-01-19 RX ORDER — DEXTROSE 50 % IN WATER 50 %
1 SYRINGE (ML) INTRAVENOUS ONCE
Qty: 0 | Refills: 0 | Status: COMPLETED | OUTPATIENT
Start: 2017-01-19 | End: 2017-01-19

## 2017-01-19 RX ADMIN — Medication 100 MILLIGRAM(S): at 17:18

## 2017-01-19 RX ADMIN — LOSARTAN POTASSIUM 50 MILLIGRAM(S): 100 TABLET, FILM COATED ORAL at 05:22

## 2017-01-19 RX ADMIN — CARVEDILOL PHOSPHATE 6.25 MILLIGRAM(S): 80 CAPSULE, EXTENDED RELEASE ORAL at 05:22

## 2017-01-19 RX ADMIN — Medication 100 MILLIGRAM(S): at 17:19

## 2017-01-19 RX ADMIN — Medication 4 UNIT(S): at 13:07

## 2017-01-19 RX ADMIN — Medication 81 MILLIGRAM(S): at 13:06

## 2017-01-19 RX ADMIN — Medication 3: at 13:06

## 2017-01-19 RX ADMIN — HEPARIN SODIUM 5000 UNIT(S): 5000 INJECTION INTRAVENOUS; SUBCUTANEOUS at 22:28

## 2017-01-19 RX ADMIN — Medication 4 UNIT(S): at 08:34

## 2017-01-19 RX ADMIN — Medication 1 DOSE(S): at 16:45

## 2017-01-19 RX ADMIN — CLOPIDOGREL BISULFATE 75 MILLIGRAM(S): 75 TABLET, FILM COATED ORAL at 13:06

## 2017-01-19 RX ADMIN — Medication 1: at 22:28

## 2017-01-19 RX ADMIN — Medication 1: at 08:34

## 2017-01-19 RX ADMIN — HEPARIN SODIUM 5000 UNIT(S): 5000 INJECTION INTRAVENOUS; SUBCUTANEOUS at 09:12

## 2017-01-19 RX ADMIN — CARVEDILOL PHOSPHATE 6.25 MILLIGRAM(S): 80 CAPSULE, EXTENDED RELEASE ORAL at 17:19

## 2017-01-19 RX ADMIN — ATORVASTATIN CALCIUM 40 MILLIGRAM(S): 80 TABLET, FILM COATED ORAL at 22:28

## 2017-01-19 RX ADMIN — INSULIN GLARGINE 18 UNIT(S): 100 INJECTION, SOLUTION SUBCUTANEOUS at 08:34

## 2017-01-19 NOTE — PROVIDER CONTACT NOTE (OTHER) - ACTION/TREATMENT ORDERED:
Follow hypoglycemia protocol until FS > 100. MD will adjust insulin. DO NOT give sliding scale OR pre-meal insulin before dinner. Will continue to monitor.

## 2017-01-19 NOTE — PHYSICAL THERAPY INITIAL EVALUATION ADULT - PERTINENT HX OF CURRENT PROBLEM, REHAB EVAL
Pt presented to [facility] from  with STEMI, had stent placed to mid LAD 1/11/17; PMH HTN, DM2, ESRD on HD M/W/F; echo +severe LV dysfunction, ECG + 1st degree AV block and occassional PACs, + s.aureus, possible endocarditis

## 2017-01-20 LAB
ANION GAP SERPL CALC-SCNC: 19 MMOL/L — HIGH (ref 5–17)
BUN SERPL-MCNC: 42 MG/DL — HIGH (ref 7–23)
CALCIUM SERPL-MCNC: 8 MG/DL — LOW (ref 8.4–10.5)
CHLORIDE SERPL-SCNC: 87 MMOL/L — LOW (ref 96–108)
CO2 SERPL-SCNC: 23 MMOL/L — SIGNIFICANT CHANGE UP (ref 22–31)
CREAT SERPL-MCNC: 6.51 MG/DL — HIGH (ref 0.5–1.3)
GLUCOSE SERPL-MCNC: 195 MG/DL — HIGH (ref 70–99)
HCT VFR BLD CALC: 29.1 % — LOW (ref 39–50)
HGB BLD-MCNC: 9.3 G/DL — LOW (ref 13–17)
MAGNESIUM SERPL-MCNC: 2 MG/DL — SIGNIFICANT CHANGE UP (ref 1.6–2.6)
MCHC RBC-ENTMCNC: 29.6 PG — SIGNIFICANT CHANGE UP (ref 27–34)
MCHC RBC-ENTMCNC: 32 GM/DL — SIGNIFICANT CHANGE UP (ref 32–36)
MCV RBC AUTO: 92.7 FL — SIGNIFICANT CHANGE UP (ref 80–100)
PHOSPHATE SERPL-MCNC: 5.1 MG/DL — HIGH (ref 2.5–4.5)
PLATELET # BLD AUTO: 196 K/UL — SIGNIFICANT CHANGE UP (ref 150–400)
POTASSIUM SERPL-MCNC: 4.7 MMOL/L — SIGNIFICANT CHANGE UP (ref 3.5–5.3)
POTASSIUM SERPL-SCNC: 4.7 MMOL/L — SIGNIFICANT CHANGE UP (ref 3.5–5.3)
RBC # BLD: 3.14 M/UL — LOW (ref 4.2–5.8)
RBC # FLD: 12.9 % — SIGNIFICANT CHANGE UP (ref 10.3–14.5)
SODIUM SERPL-SCNC: 129 MMOL/L — LOW (ref 135–145)
WBC # BLD: 7.19 K/UL — SIGNIFICANT CHANGE UP (ref 3.8–10.5)
WBC # FLD AUTO: 7.19 K/UL — SIGNIFICANT CHANGE UP (ref 3.8–10.5)

## 2017-01-20 PROCEDURE — 99233 SBSQ HOSP IP/OBS HIGH 50: CPT | Mod: GC

## 2017-01-20 PROCEDURE — 99232 SBSQ HOSP IP/OBS MODERATE 35: CPT

## 2017-01-20 PROCEDURE — 99233 SBSQ HOSP IP/OBS HIGH 50: CPT

## 2017-01-20 RX ORDER — INSULIN LISPRO 100/ML
8 VIAL (ML) SUBCUTANEOUS
Qty: 0 | Refills: 0 | Status: DISCONTINUED | OUTPATIENT
Start: 2017-01-20 | End: 2017-01-24

## 2017-01-20 RX ADMIN — INSULIN GLARGINE 18 UNIT(S): 100 INJECTION, SOLUTION SUBCUTANEOUS at 08:27

## 2017-01-20 RX ADMIN — HEPARIN SODIUM 5000 UNIT(S): 5000 INJECTION INTRAVENOUS; SUBCUTANEOUS at 08:26

## 2017-01-20 RX ADMIN — Medication 3: at 12:15

## 2017-01-20 RX ADMIN — CLOPIDOGREL BISULFATE 75 MILLIGRAM(S): 75 TABLET, FILM COATED ORAL at 12:15

## 2017-01-20 RX ADMIN — CARVEDILOL PHOSPHATE 6.25 MILLIGRAM(S): 80 CAPSULE, EXTENDED RELEASE ORAL at 18:28

## 2017-01-20 RX ADMIN — Medication 1: at 08:26

## 2017-01-20 RX ADMIN — Medication 5 UNIT(S): at 18:27

## 2017-01-20 RX ADMIN — Medication 81 MILLIGRAM(S): at 12:15

## 2017-01-20 RX ADMIN — CARVEDILOL PHOSPHATE 6.25 MILLIGRAM(S): 80 CAPSULE, EXTENDED RELEASE ORAL at 08:26

## 2017-01-20 RX ADMIN — ATORVASTATIN CALCIUM 40 MILLIGRAM(S): 80 TABLET, FILM COATED ORAL at 22:16

## 2017-01-20 RX ADMIN — Medication 100 MILLIGRAM(S): at 18:28

## 2017-01-20 RX ADMIN — HEPARIN SODIUM 5000 UNIT(S): 5000 INJECTION INTRAVENOUS; SUBCUTANEOUS at 21:25

## 2017-01-20 RX ADMIN — Medication 6 UNIT(S): at 08:27

## 2017-01-20 RX ADMIN — ERYTHROPOIETIN 10000 UNIT(S): 10000 INJECTION, SOLUTION INTRAVENOUS; SUBCUTANEOUS at 16:06

## 2017-01-20 RX ADMIN — LOSARTAN POTASSIUM 50 MILLIGRAM(S): 100 TABLET, FILM COATED ORAL at 08:28

## 2017-01-21 LAB
ANION GAP SERPL CALC-SCNC: 14 MMOL/L — SIGNIFICANT CHANGE UP (ref 5–17)
BUN SERPL-MCNC: 23 MG/DL — SIGNIFICANT CHANGE UP (ref 7–23)
CALCIUM SERPL-MCNC: 8.3 MG/DL — LOW (ref 8.4–10.5)
CHLORIDE SERPL-SCNC: 94 MMOL/L — LOW (ref 96–108)
CO2 SERPL-SCNC: 27 MMOL/L — SIGNIFICANT CHANGE UP (ref 22–31)
CREAT SERPL-MCNC: 4.46 MG/DL — HIGH (ref 0.5–1.3)
GLUCOSE SERPL-MCNC: 132 MG/DL — HIGH (ref 70–99)
HCT VFR BLD CALC: 29.3 % — LOW (ref 39–50)
HGB BLD-MCNC: 9.6 G/DL — LOW (ref 13–17)
MAGNESIUM SERPL-MCNC: 1.9 MG/DL — SIGNIFICANT CHANGE UP (ref 1.6–2.6)
MCHC RBC-ENTMCNC: 30.6 PG — SIGNIFICANT CHANGE UP (ref 27–34)
MCHC RBC-ENTMCNC: 32.8 GM/DL — SIGNIFICANT CHANGE UP (ref 32–36)
MCV RBC AUTO: 93.3 FL — SIGNIFICANT CHANGE UP (ref 80–100)
PHOSPHATE SERPL-MCNC: 3.8 MG/DL — SIGNIFICANT CHANGE UP (ref 2.5–4.5)
PLATELET # BLD AUTO: 201 K/UL — SIGNIFICANT CHANGE UP (ref 150–400)
POTASSIUM SERPL-MCNC: 4.6 MMOL/L — SIGNIFICANT CHANGE UP (ref 3.5–5.3)
POTASSIUM SERPL-SCNC: 4.6 MMOL/L — SIGNIFICANT CHANGE UP (ref 3.5–5.3)
RBC # BLD: 3.13 M/UL — LOW (ref 4.2–5.8)
RBC # FLD: 12 % — SIGNIFICANT CHANGE UP (ref 10.3–14.5)
SODIUM SERPL-SCNC: 135 MMOL/L — SIGNIFICANT CHANGE UP (ref 135–145)
WBC # BLD: 6.8 K/UL — SIGNIFICANT CHANGE UP (ref 3.8–10.5)
WBC # FLD AUTO: 6.8 K/UL — SIGNIFICANT CHANGE UP (ref 3.8–10.5)

## 2017-01-21 PROCEDURE — 99233 SBSQ HOSP IP/OBS HIGH 50: CPT | Mod: GC

## 2017-01-21 PROCEDURE — 70450 CT HEAD/BRAIN W/O DYE: CPT | Mod: 26

## 2017-01-21 RX ADMIN — HEPARIN SODIUM 5000 UNIT(S): 5000 INJECTION INTRAVENOUS; SUBCUTANEOUS at 08:29

## 2017-01-21 RX ADMIN — INSULIN GLARGINE 18 UNIT(S): 100 INJECTION, SOLUTION SUBCUTANEOUS at 08:30

## 2017-01-21 RX ADMIN — CARVEDILOL PHOSPHATE 6.25 MILLIGRAM(S): 80 CAPSULE, EXTENDED RELEASE ORAL at 06:27

## 2017-01-21 RX ADMIN — Medication 81 MILLIGRAM(S): at 12:20

## 2017-01-21 RX ADMIN — Medication 5 UNIT(S): at 18:33

## 2017-01-21 RX ADMIN — Medication 8 UNIT(S): at 08:29

## 2017-01-21 RX ADMIN — LOSARTAN POTASSIUM 50 MILLIGRAM(S): 100 TABLET, FILM COATED ORAL at 06:27

## 2017-01-21 RX ADMIN — Medication 100 MILLIGRAM(S): at 17:30

## 2017-01-21 RX ADMIN — ATORVASTATIN CALCIUM 40 MILLIGRAM(S): 80 TABLET, FILM COATED ORAL at 21:25

## 2017-01-21 RX ADMIN — Medication 1: at 12:20

## 2017-01-21 RX ADMIN — CLOPIDOGREL BISULFATE 75 MILLIGRAM(S): 75 TABLET, FILM COATED ORAL at 12:20

## 2017-01-21 RX ADMIN — HEPARIN SODIUM 5000 UNIT(S): 5000 INJECTION INTRAVENOUS; SUBCUTANEOUS at 21:25

## 2017-01-21 RX ADMIN — Medication 100 MILLIGRAM(S): at 14:25

## 2017-01-21 RX ADMIN — CARVEDILOL PHOSPHATE 6.25 MILLIGRAM(S): 80 CAPSULE, EXTENDED RELEASE ORAL at 17:29

## 2017-01-21 NOTE — PROVIDER CONTACT NOTE (FALL NOTIFICATION) - RECOMMENDATIONS
MD and supervisor Kaleigh villatoro. Patient was placed back in bed and bed alarm was placed and patient was instructed to use call bell when he needs assistance.

## 2017-01-21 NOTE — PROVIDER CONTACT NOTE (FALL NOTIFICATION) - ASSESSMENT
No trauma from fall noted, patient did not hit his head or any other parts of his body. AAOx4. Patient states he "lost his balance."

## 2017-01-21 NOTE — PROVIDER CONTACT NOTE (FALL NOTIFICATION) - SITUATION
Patient was walking to the bathroom with his wife to brush his teeth, lost his balance, and was lowered to the floor by his wife.

## 2017-01-22 LAB
ANION GAP SERPL CALC-SCNC: 17 MMOL/L — SIGNIFICANT CHANGE UP (ref 5–17)
BUN SERPL-MCNC: 38 MG/DL — HIGH (ref 7–23)
CALCIUM SERPL-MCNC: 8.2 MG/DL — LOW (ref 8.4–10.5)
CHLORIDE SERPL-SCNC: 91 MMOL/L — LOW (ref 96–108)
CO2 SERPL-SCNC: 21 MMOL/L — LOW (ref 22–31)
CREAT SERPL-MCNC: 5.7 MG/DL — HIGH (ref 0.5–1.3)
GLUCOSE SERPL-MCNC: 145 MG/DL — HIGH (ref 70–99)
HCT VFR BLD CALC: 27.6 % — LOW (ref 39–50)
HGB BLD-MCNC: 8.8 G/DL — LOW (ref 13–17)
MAGNESIUM SERPL-MCNC: 2 MG/DL — SIGNIFICANT CHANGE UP (ref 1.6–2.6)
MCHC RBC-ENTMCNC: 29.6 PG — SIGNIFICANT CHANGE UP (ref 27–34)
MCHC RBC-ENTMCNC: 31.9 GM/DL — LOW (ref 32–36)
MCV RBC AUTO: 92.9 FL — SIGNIFICANT CHANGE UP (ref 80–100)
PHOSPHATE SERPL-MCNC: 5.2 MG/DL — HIGH (ref 2.5–4.5)
PLATELET # BLD AUTO: 213 K/UL — SIGNIFICANT CHANGE UP (ref 150–400)
POTASSIUM SERPL-MCNC: 4.3 MMOL/L — SIGNIFICANT CHANGE UP (ref 3.5–5.3)
POTASSIUM SERPL-SCNC: 4.3 MMOL/L — SIGNIFICANT CHANGE UP (ref 3.5–5.3)
RBC # BLD: 2.97 M/UL — LOW (ref 4.2–5.8)
RBC # FLD: 12.8 % — SIGNIFICANT CHANGE UP (ref 10.3–14.5)
SODIUM SERPL-SCNC: 129 MMOL/L — LOW (ref 135–145)
WBC # BLD: 6.98 K/UL — SIGNIFICANT CHANGE UP (ref 3.8–10.5)
WBC # FLD AUTO: 6.98 K/UL — SIGNIFICANT CHANGE UP (ref 3.8–10.5)

## 2017-01-22 PROCEDURE — 99232 SBSQ HOSP IP/OBS MODERATE 35: CPT | Mod: GC

## 2017-01-22 RX ORDER — INSULIN GLARGINE 100 [IU]/ML
16 INJECTION, SOLUTION SUBCUTANEOUS EVERY MORNING
Qty: 0 | Refills: 0 | Status: DISCONTINUED | OUTPATIENT
Start: 2017-01-22 | End: 2017-01-23

## 2017-01-22 RX ORDER — LANOLIN ALCOHOL/MO/W.PET/CERES
3 CREAM (GRAM) TOPICAL AT BEDTIME
Qty: 0 | Refills: 0 | Status: DISCONTINUED | OUTPATIENT
Start: 2017-01-22 | End: 2017-01-24

## 2017-01-22 RX ADMIN — CARVEDILOL PHOSPHATE 6.25 MILLIGRAM(S): 80 CAPSULE, EXTENDED RELEASE ORAL at 18:13

## 2017-01-22 RX ADMIN — HEPARIN SODIUM 5000 UNIT(S): 5000 INJECTION INTRAVENOUS; SUBCUTANEOUS at 08:53

## 2017-01-22 RX ADMIN — Medication 81 MILLIGRAM(S): at 13:17

## 2017-01-22 RX ADMIN — Medication 3 MILLIGRAM(S): at 21:51

## 2017-01-22 RX ADMIN — CLOPIDOGREL BISULFATE 75 MILLIGRAM(S): 75 TABLET, FILM COATED ORAL at 13:17

## 2017-01-22 RX ADMIN — Medication 3: at 13:16

## 2017-01-22 RX ADMIN — Medication 100 MILLIGRAM(S): at 18:38

## 2017-01-22 RX ADMIN — ATORVASTATIN CALCIUM 40 MILLIGRAM(S): 80 TABLET, FILM COATED ORAL at 21:51

## 2017-01-22 RX ADMIN — Medication 1: at 08:52

## 2017-01-22 RX ADMIN — Medication 8 UNIT(S): at 08:53

## 2017-01-22 RX ADMIN — LOSARTAN POTASSIUM 50 MILLIGRAM(S): 100 TABLET, FILM COATED ORAL at 06:04

## 2017-01-22 RX ADMIN — INSULIN GLARGINE 18 UNIT(S): 100 INJECTION, SOLUTION SUBCUTANEOUS at 08:58

## 2017-01-22 RX ADMIN — HEPARIN SODIUM 5000 UNIT(S): 5000 INJECTION INTRAVENOUS; SUBCUTANEOUS at 21:51

## 2017-01-22 RX ADMIN — CARVEDILOL PHOSPHATE 6.25 MILLIGRAM(S): 80 CAPSULE, EXTENDED RELEASE ORAL at 06:04

## 2017-01-22 RX ADMIN — Medication 1: at 18:13

## 2017-01-22 RX ADMIN — Medication 5 UNIT(S): at 18:13

## 2017-01-23 LAB
ANION GAP SERPL CALC-SCNC: 18 MMOL/L — HIGH (ref 5–17)
BUN SERPL-MCNC: 54 MG/DL — HIGH (ref 7–23)
C DIFF BY PCR RESULT: SIGNIFICANT CHANGE UP
C DIFF TOX GENS STL QL NAA+PROBE: SIGNIFICANT CHANGE UP
CALCIUM SERPL-MCNC: 8 MG/DL — LOW (ref 8.4–10.5)
CHLORIDE SERPL-SCNC: 88 MMOL/L — LOW (ref 96–108)
CO2 SERPL-SCNC: 19 MMOL/L — LOW (ref 22–31)
CREAT SERPL-MCNC: 6.6 MG/DL — HIGH (ref 0.5–1.3)
GLUCOSE SERPL-MCNC: 185 MG/DL — HIGH (ref 70–99)
HCT VFR BLD CALC: 25.1 % — LOW (ref 39–50)
HCT VFR BLD CALC: 27.7 % — LOW (ref 39–50)
HGB BLD-MCNC: 8.1 G/DL — LOW (ref 13–17)
HGB BLD-MCNC: 9 G/DL — LOW (ref 13–17)
MAGNESIUM SERPL-MCNC: 2.1 MG/DL — SIGNIFICANT CHANGE UP (ref 1.6–2.6)
MCHC RBC-ENTMCNC: 29.6 PG — SIGNIFICANT CHANGE UP (ref 27–34)
MCHC RBC-ENTMCNC: 30.7 PG — SIGNIFICANT CHANGE UP (ref 27–34)
MCHC RBC-ENTMCNC: 32.3 GM/DL — SIGNIFICANT CHANGE UP (ref 32–36)
MCHC RBC-ENTMCNC: 32.6 GM/DL — SIGNIFICANT CHANGE UP (ref 32–36)
MCV RBC AUTO: 91.6 FL — SIGNIFICANT CHANGE UP (ref 80–100)
MCV RBC AUTO: 93.9 FL — SIGNIFICANT CHANGE UP (ref 80–100)
PHOSPHATE SERPL-MCNC: 5.8 MG/DL — HIGH (ref 2.5–4.5)
PLATELET # BLD AUTO: 207 K/UL — SIGNIFICANT CHANGE UP (ref 150–400)
PLATELET # BLD AUTO: 230 K/UL — SIGNIFICANT CHANGE UP (ref 150–400)
POTASSIUM SERPL-MCNC: 4.5 MMOL/L — SIGNIFICANT CHANGE UP (ref 3.5–5.3)
POTASSIUM SERPL-SCNC: 4.5 MMOL/L — SIGNIFICANT CHANGE UP (ref 3.5–5.3)
RBC # BLD: 2.74 M/UL — LOW (ref 4.2–5.8)
RBC # BLD: 2.95 M/UL — LOW (ref 4.2–5.8)
RBC # FLD: 13 % — SIGNIFICANT CHANGE UP (ref 10.3–14.5)
RBC # FLD: 13.3 % — SIGNIFICANT CHANGE UP (ref 10.3–14.5)
SODIUM SERPL-SCNC: 125 MMOL/L — LOW (ref 135–145)
WBC # BLD: 6.34 K/UL — SIGNIFICANT CHANGE UP (ref 3.8–10.5)
WBC # BLD: 7 K/UL — SIGNIFICANT CHANGE UP (ref 3.8–10.5)
WBC # FLD AUTO: 6.34 K/UL — SIGNIFICANT CHANGE UP (ref 3.8–10.5)
WBC # FLD AUTO: 7 K/UL — SIGNIFICANT CHANGE UP (ref 3.8–10.5)

## 2017-01-23 PROCEDURE — 93320 DOPPLER ECHO COMPLETE: CPT | Mod: 26

## 2017-01-23 PROCEDURE — 99232 SBSQ HOSP IP/OBS MODERATE 35: CPT

## 2017-01-23 PROCEDURE — 93325 DOPPLER ECHO COLOR FLOW MAPG: CPT | Mod: 26

## 2017-01-23 PROCEDURE — 93312 ECHO TRANSESOPHAGEAL: CPT | Mod: 26

## 2017-01-23 PROCEDURE — 99232 SBSQ HOSP IP/OBS MODERATE 35: CPT | Mod: GC

## 2017-01-23 PROCEDURE — 99233 SBSQ HOSP IP/OBS HIGH 50: CPT

## 2017-01-23 RX ORDER — INSULIN GLARGINE 100 [IU]/ML
15 INJECTION, SOLUTION SUBCUTANEOUS EVERY MORNING
Qty: 0 | Refills: 0 | Status: DISCONTINUED | OUTPATIENT
Start: 2017-01-24 | End: 2017-01-24

## 2017-01-23 RX ORDER — INSULIN GLARGINE 100 [IU]/ML
8 INJECTION, SOLUTION SUBCUTANEOUS EVERY MORNING
Qty: 0 | Refills: 0 | Status: DISCONTINUED | OUTPATIENT
Start: 2017-01-23 | End: 2017-01-23

## 2017-01-23 RX ADMIN — CARVEDILOL PHOSPHATE 6.25 MILLIGRAM(S): 80 CAPSULE, EXTENDED RELEASE ORAL at 06:18

## 2017-01-23 RX ADMIN — Medication 100 MILLIGRAM(S): at 23:14

## 2017-01-23 RX ADMIN — ERYTHROPOIETIN 10000 UNIT(S): 10000 INJECTION, SOLUTION INTRAVENOUS; SUBCUTANEOUS at 20:14

## 2017-01-23 RX ADMIN — Medication 81 MILLIGRAM(S): at 18:20

## 2017-01-23 RX ADMIN — HEPARIN SODIUM 5000 UNIT(S): 5000 INJECTION INTRAVENOUS; SUBCUTANEOUS at 23:13

## 2017-01-23 RX ADMIN — Medication 3 MILLIGRAM(S): at 23:13

## 2017-01-23 RX ADMIN — CLOPIDOGREL BISULFATE 75 MILLIGRAM(S): 75 TABLET, FILM COATED ORAL at 18:20

## 2017-01-23 RX ADMIN — Medication 1: at 18:21

## 2017-01-23 RX ADMIN — ATORVASTATIN CALCIUM 40 MILLIGRAM(S): 80 TABLET, FILM COATED ORAL at 23:13

## 2017-01-23 RX ADMIN — LOSARTAN POTASSIUM 50 MILLIGRAM(S): 100 TABLET, FILM COATED ORAL at 06:18

## 2017-01-23 RX ADMIN — INSULIN GLARGINE 8 UNIT(S): 100 INJECTION, SOLUTION SUBCUTANEOUS at 11:34

## 2017-01-24 VITALS
RESPIRATION RATE: 18 BRPM | SYSTOLIC BLOOD PRESSURE: 130 MMHG | HEART RATE: 73 BPM | TEMPERATURE: 98 F | OXYGEN SATURATION: 100 % | DIASTOLIC BLOOD PRESSURE: 74 MMHG

## 2017-01-24 LAB
ANION GAP SERPL CALC-SCNC: 16 MMOL/L — SIGNIFICANT CHANGE UP (ref 5–17)
BUN SERPL-MCNC: 28 MG/DL — HIGH (ref 7–23)
CALCIUM SERPL-MCNC: 8.4 MG/DL — SIGNIFICANT CHANGE UP (ref 8.4–10.5)
CHLORIDE SERPL-SCNC: 94 MMOL/L — LOW (ref 96–108)
CO2 SERPL-SCNC: 24 MMOL/L — SIGNIFICANT CHANGE UP (ref 22–31)
CREAT SERPL-MCNC: 4.24 MG/DL — HIGH (ref 0.5–1.3)
CULTURE RESULTS: SIGNIFICANT CHANGE UP
CULTURE RESULTS: SIGNIFICANT CHANGE UP
GLUCOSE SERPL-MCNC: 196 MG/DL — HIGH (ref 70–99)
MAGNESIUM SERPL-MCNC: 2 MG/DL — SIGNIFICANT CHANGE UP (ref 1.6–2.6)
PHOSPHATE SERPL-MCNC: 4.1 MG/DL — SIGNIFICANT CHANGE UP (ref 2.5–4.5)
POTASSIUM SERPL-MCNC: 4.6 MMOL/L — SIGNIFICANT CHANGE UP (ref 3.5–5.3)
POTASSIUM SERPL-SCNC: 4.6 MMOL/L — SIGNIFICANT CHANGE UP (ref 3.5–5.3)
SODIUM SERPL-SCNC: 133 MMOL/L — LOW (ref 135–145)
SPECIMEN SOURCE: SIGNIFICANT CHANGE UP
SPECIMEN SOURCE: SIGNIFICANT CHANGE UP

## 2017-01-24 PROCEDURE — 82550 ASSAY OF CK (CPK): CPT

## 2017-01-24 PROCEDURE — C9600: CPT | Mod: LD

## 2017-01-24 PROCEDURE — 83550 IRON BINDING TEST: CPT

## 2017-01-24 PROCEDURE — 84300 ASSAY OF URINE SODIUM: CPT

## 2017-01-24 PROCEDURE — 84132 ASSAY OF SERUM POTASSIUM: CPT

## 2017-01-24 PROCEDURE — 82009 KETONE BODYS QUAL: CPT

## 2017-01-24 PROCEDURE — 83605 ASSAY OF LACTIC ACID: CPT

## 2017-01-24 PROCEDURE — 84484 ASSAY OF TROPONIN QUANT: CPT

## 2017-01-24 PROCEDURE — 93005 ELECTROCARDIOGRAM TRACING: CPT

## 2017-01-24 PROCEDURE — 80048 BASIC METABOLIC PNL TOTAL CA: CPT

## 2017-01-24 PROCEDURE — 84100 ASSAY OF PHOSPHORUS: CPT

## 2017-01-24 PROCEDURE — 82570 ASSAY OF URINE CREATININE: CPT

## 2017-01-24 PROCEDURE — C1887: CPT

## 2017-01-24 PROCEDURE — 82746 ASSAY OF FOLIC ACID SERUM: CPT

## 2017-01-24 PROCEDURE — 87581 M.PNEUMON DNA AMP PROBE: CPT

## 2017-01-24 PROCEDURE — C1876: CPT

## 2017-01-24 PROCEDURE — 85730 THROMBOPLASTIN TIME PARTIAL: CPT

## 2017-01-24 PROCEDURE — 80074 ACUTE HEPATITIS PANEL: CPT

## 2017-01-24 PROCEDURE — 82728 ASSAY OF FERRITIN: CPT

## 2017-01-24 PROCEDURE — 84295 ASSAY OF SERUM SODIUM: CPT

## 2017-01-24 PROCEDURE — 93454 CORONARY ARTERY ANGIO S&I: CPT | Mod: 59

## 2017-01-24 PROCEDURE — 83735 ASSAY OF MAGNESIUM: CPT

## 2017-01-24 PROCEDURE — 83930 ASSAY OF BLOOD OSMOLALITY: CPT

## 2017-01-24 PROCEDURE — C1894: CPT

## 2017-01-24 PROCEDURE — 80053 COMPREHEN METABOLIC PANEL: CPT

## 2017-01-24 PROCEDURE — 97116 GAIT TRAINING THERAPY: CPT

## 2017-01-24 PROCEDURE — 85610 PROTHROMBIN TIME: CPT

## 2017-01-24 PROCEDURE — 93306 TTE W/DOPPLER COMPLETE: CPT

## 2017-01-24 PROCEDURE — 74176 CT ABD & PELVIS W/O CONTRAST: CPT

## 2017-01-24 PROCEDURE — 76705 ECHO EXAM OF ABDOMEN: CPT

## 2017-01-24 PROCEDURE — 82150 ASSAY OF AMYLASE: CPT

## 2017-01-24 PROCEDURE — 82435 ASSAY OF BLOOD CHLORIDE: CPT

## 2017-01-24 PROCEDURE — 87798 DETECT AGENT NOS DNA AMP: CPT

## 2017-01-24 PROCEDURE — 82010 KETONE BODYS QUAN: CPT

## 2017-01-24 PROCEDURE — 81001 URINALYSIS AUTO W/SCOPE: CPT

## 2017-01-24 PROCEDURE — 87486 CHLMYD PNEUM DNA AMP PROBE: CPT

## 2017-01-24 PROCEDURE — 99261: CPT

## 2017-01-24 PROCEDURE — 99233 SBSQ HOSP IP/OBS HIGH 50: CPT

## 2017-01-24 PROCEDURE — 82803 BLOOD GASES ANY COMBINATION: CPT

## 2017-01-24 PROCEDURE — 86901 BLOOD TYPING SEROLOGIC RH(D): CPT

## 2017-01-24 PROCEDURE — 84480 ASSAY TRIIODOTHYRONINE (T3): CPT

## 2017-01-24 PROCEDURE — C8929: CPT

## 2017-01-24 PROCEDURE — 93320 DOPPLER ECHO COMPLETE: CPT

## 2017-01-24 PROCEDURE — 93325 DOPPLER ECHO COLOR FLOW MAPG: CPT

## 2017-01-24 PROCEDURE — 99239 HOSP IP/OBS DSCHRG MGMT >30: CPT

## 2017-01-24 PROCEDURE — 76536 US EXAM OF HEAD AND NECK: CPT

## 2017-01-24 PROCEDURE — 97530 THERAPEUTIC ACTIVITIES: CPT

## 2017-01-24 PROCEDURE — 84443 ASSAY THYROID STIM HORMONE: CPT

## 2017-01-24 PROCEDURE — 70450 CT HEAD/BRAIN W/O DYE: CPT

## 2017-01-24 PROCEDURE — 87493 C DIFF AMPLIFIED PROBE: CPT

## 2017-01-24 PROCEDURE — 80061 LIPID PANEL: CPT

## 2017-01-24 PROCEDURE — 82947 ASSAY GLUCOSE BLOOD QUANT: CPT

## 2017-01-24 PROCEDURE — 82607 VITAMIN B-12: CPT

## 2017-01-24 PROCEDURE — 85014 HEMATOCRIT: CPT

## 2017-01-24 PROCEDURE — 87040 BLOOD CULTURE FOR BACTERIA: CPT

## 2017-01-24 PROCEDURE — 83036 HEMOGLOBIN GLYCOSYLATED A1C: CPT

## 2017-01-24 PROCEDURE — 82553 CREATINE MB FRACTION: CPT

## 2017-01-24 PROCEDURE — 87086 URINE CULTURE/COLONY COUNT: CPT

## 2017-01-24 PROCEDURE — 85027 COMPLETE CBC AUTOMATED: CPT

## 2017-01-24 PROCEDURE — 83935 ASSAY OF URINE OSMOLALITY: CPT

## 2017-01-24 PROCEDURE — 86900 BLOOD TYPING SEROLOGIC ABO: CPT

## 2017-01-24 PROCEDURE — 97161 PT EVAL LOW COMPLEX 20 MIN: CPT

## 2017-01-24 PROCEDURE — 84439 ASSAY OF FREE THYROXINE: CPT

## 2017-01-24 PROCEDURE — 93312 ECHO TRANSESOPHAGEAL: CPT

## 2017-01-24 PROCEDURE — 99232 SBSQ HOSP IP/OBS MODERATE 35: CPT

## 2017-01-24 PROCEDURE — 76882 US LMTD JT/FCL EVL NVASC XTR: CPT

## 2017-01-24 PROCEDURE — 87633 RESP VIRUS 12-25 TARGETS: CPT

## 2017-01-24 PROCEDURE — 82330 ASSAY OF CALCIUM: CPT

## 2017-01-24 PROCEDURE — 80202 ASSAY OF VANCOMYCIN: CPT

## 2017-01-24 PROCEDURE — 87186 SC STD MICRODIL/AGAR DIL: CPT

## 2017-01-24 PROCEDURE — C1769: CPT

## 2017-01-24 PROCEDURE — 71045 X-RAY EXAM CHEST 1 VIEW: CPT

## 2017-01-24 PROCEDURE — 86850 RBC ANTIBODY SCREEN: CPT

## 2017-01-24 PROCEDURE — 83690 ASSAY OF LIPASE: CPT

## 2017-01-24 RX ORDER — ATORVASTATIN CALCIUM 80 MG/1
1 TABLET, FILM COATED ORAL
Qty: 0 | Refills: 0 | COMMUNITY
Start: 2017-01-24

## 2017-01-24 RX ORDER — ATORVASTATIN CALCIUM 80 MG/1
1 TABLET, FILM COATED ORAL
Qty: 30 | Refills: 0 | OUTPATIENT
Start: 2017-01-24 | End: 2017-02-23

## 2017-01-24 RX ORDER — CLOPIDOGREL BISULFATE 75 MG/1
1 TABLET, FILM COATED ORAL
Qty: 0 | Refills: 0 | COMMUNITY
Start: 2017-01-24

## 2017-01-24 RX ORDER — CARVEDILOL PHOSPHATE 80 MG/1
1 CAPSULE, EXTENDED RELEASE ORAL
Qty: 0 | Refills: 0 | COMMUNITY
Start: 2017-01-24

## 2017-01-24 RX ORDER — ENOXAPARIN SODIUM 100 MG/ML
15 INJECTION SUBCUTANEOUS
Qty: 1 | Refills: 1 | OUTPATIENT
Start: 2017-01-24

## 2017-01-24 RX ORDER — CLOPIDOGREL BISULFATE 75 MG/1
1 TABLET, FILM COATED ORAL
Qty: 30 | Refills: 0 | OUTPATIENT
Start: 2017-01-24 | End: 2017-02-23

## 2017-01-24 RX ORDER — CEFAZOLIN SODIUM 1 G
1 VIAL (EA) INJECTION
Qty: 1 | Refills: 0 | OUTPATIENT
Start: 2017-01-24

## 2017-01-24 RX ORDER — LOSARTAN POTASSIUM 100 MG/1
1 TABLET, FILM COATED ORAL
Qty: 0 | Refills: 0 | COMMUNITY
Start: 2017-01-24

## 2017-01-24 RX ORDER — LOSARTAN POTASSIUM 100 MG/1
1 TABLET, FILM COATED ORAL
Qty: 30 | Refills: 0 | OUTPATIENT
Start: 2017-01-24 | End: 2017-02-23

## 2017-01-24 RX ORDER — ASPIRIN/CALCIUM CARB/MAGNESIUM 324 MG
1 TABLET ORAL
Qty: 30 | Refills: 0 | OUTPATIENT
Start: 2017-01-24 | End: 2017-02-23

## 2017-01-24 RX ORDER — CARVEDILOL PHOSPHATE 80 MG/1
1 CAPSULE, EXTENDED RELEASE ORAL
Qty: 60 | Refills: 0 | OUTPATIENT
Start: 2017-01-24 | End: 2017-02-23

## 2017-01-24 RX ORDER — ASPIRIN/CALCIUM CARB/MAGNESIUM 324 MG
1 TABLET ORAL
Qty: 0 | Refills: 0 | COMMUNITY
Start: 2017-01-24

## 2017-01-24 RX ORDER — INSULIN LISPRO 100/ML
6 VIAL (ML) SUBCUTANEOUS
Qty: 3 | Refills: 1 | COMMUNITY
Start: 2017-01-24

## 2017-01-24 RX ORDER — INSULIN LISPRO 100/ML
8 VIAL (ML) SUBCUTANEOUS
Qty: 3 | Refills: 1 | OUTPATIENT
Start: 2017-01-24

## 2017-01-24 RX ADMIN — INSULIN GLARGINE 15 UNIT(S): 100 INJECTION, SOLUTION SUBCUTANEOUS at 08:43

## 2017-01-24 RX ADMIN — CARVEDILOL PHOSPHATE 6.25 MILLIGRAM(S): 80 CAPSULE, EXTENDED RELEASE ORAL at 17:45

## 2017-01-24 RX ADMIN — HEPARIN SODIUM 5000 UNIT(S): 5000 INJECTION INTRAVENOUS; SUBCUTANEOUS at 08:45

## 2017-01-24 RX ADMIN — Medication 8 UNIT(S): at 08:44

## 2017-01-24 RX ADMIN — Medication 100 MILLIGRAM(S): at 18:05

## 2017-01-24 RX ADMIN — LOSARTAN POTASSIUM 50 MILLIGRAM(S): 100 TABLET, FILM COATED ORAL at 06:20

## 2017-01-24 RX ADMIN — Medication 100 MILLIGRAM(S): at 01:20

## 2017-01-24 RX ADMIN — Medication 81 MILLIGRAM(S): at 12:58

## 2017-01-24 RX ADMIN — Medication 1: at 08:44

## 2017-01-24 RX ADMIN — Medication 1: at 12:58

## 2017-01-24 RX ADMIN — CLOPIDOGREL BISULFATE 75 MILLIGRAM(S): 75 TABLET, FILM COATED ORAL at 12:58

## 2017-01-24 RX ADMIN — CARVEDILOL PHOSPHATE 6.25 MILLIGRAM(S): 80 CAPSULE, EXTENDED RELEASE ORAL at 06:20

## 2017-01-24 NOTE — PROVIDER CONTACT NOTE (OTHER) - NAME OF MD/NP/PA/DO NOTIFIED:
Dr. Fuentes
DR Zhang Villagran
Dr. Barnett
Dr. Barnett
Dr. Dasilva
Dr. Guzman
Leo VICK
Leo VICK
MD Ross
ranjan biswas MD
smith miller MD
Dr. Barnett
shanna VICK

## 2017-01-24 NOTE — PROVIDER CONTACT NOTE (OTHER) - REASON
BP
Blood sugar 54
Fall papers
PAT upto 135 x 2.7 sec on tele
Pt FS is 61 rt 64
RVP positive for coronovirus
fs 70 before dinner, due for 6 units of insulin
hyperglycemia
hypoglycemia
patient blood sugar before lunch is 426
patient c/o sudden onset of pain on left hip
pt's temp was 101.6
fingerstick 230 fasting fingerstick
fingerstick 97 on insulin gtt
pt c/o shortness of breath

## 2017-01-24 NOTE — PROVIDER CONTACT NOTE (OTHER) - DATE AND TIME:
10-Rick-2017 13:00
10-Rick-2017 13:10
15-Rick-2017 16:00
10-Rick-2017 13:23
12-Jan-2017 07:30
12-Jan-2017 22:10
13-Jan-2017 05:30
14-Jan-2017 12:30
14-Jan-2017 20:25
17-Jan-2017 18:06
19-Jan-2017 16:25
21-Jan-2017 09:05
21-Jan-2017 14:36
24-Jan-2017 16:18

## 2017-01-31 ENCOUNTER — APPOINTMENT (OUTPATIENT)
Dept: CARDIOLOGY | Facility: CLINIC | Age: 62
End: 2017-01-31

## 2017-01-31 VITALS
WEIGHT: 145 LBS | HEART RATE: 74 BPM | DIASTOLIC BLOOD PRESSURE: 67 MMHG | SYSTOLIC BLOOD PRESSURE: 122 MMHG | OXYGEN SATURATION: 99 % | BODY MASS INDEX: 22.76 KG/M2 | HEIGHT: 67 IN

## 2017-01-31 DIAGNOSIS — E11.9 TYPE 2 DIABETES MELLITUS W/OUT COMPLICATIONS: ICD-10-CM

## 2017-01-31 DIAGNOSIS — I25.2 OLD MYOCARDIAL INFARCTION: ICD-10-CM

## 2017-01-31 DIAGNOSIS — I25.10 ATHEROSCLEROTIC HEART DISEASE OF NATIVE CORONARY ARTERY W/OUT ANGINA PECTORIS: ICD-10-CM

## 2017-01-31 RX ORDER — INSULIN GLARGINE 100 [IU]/ML
INJECTION, SOLUTION SUBCUTANEOUS
Refills: 0 | Status: ACTIVE | COMMUNITY

## 2017-01-31 RX ORDER — CLOPIDOGREL BISULFATE 75 MG/1
75 TABLET, FILM COATED ORAL DAILY
Qty: 30 | Refills: 5 | Status: ACTIVE | COMMUNITY

## 2017-01-31 RX ORDER — ATORVASTATIN CALCIUM 40 MG/1
40 TABLET, FILM COATED ORAL
Qty: 90 | Refills: 1 | Status: ACTIVE | COMMUNITY

## 2017-01-31 RX ORDER — CARVEDILOL 6.25 MG/1
6.25 TABLET, FILM COATED ORAL TWICE DAILY
Refills: 0 | Status: ACTIVE | COMMUNITY

## 2017-01-31 RX ORDER — INSULIN LISPRO 100 [IU]/ML
INJECTION, SOLUTION INTRAVENOUS; SUBCUTANEOUS
Refills: 0 | Status: ACTIVE | COMMUNITY

## 2017-01-31 RX ORDER — LOSARTAN POTASSIUM 50 MG/1
50 TABLET, FILM COATED ORAL DAILY
Qty: 30 | Refills: 6 | Status: ACTIVE | COMMUNITY

## 2017-01-31 RX ORDER — ASPIRIN 81 MG/1
81 TABLET ORAL DAILY
Refills: 0 | Status: ACTIVE | COMMUNITY

## 2017-02-01 LAB
ALBUMIN SERPL ELPH-MCNC: 3.3 G/DL
ALP BLD-CCNC: 97 U/L
ALT SERPL-CCNC: <4 U/L
ANION GAP SERPL CALC-SCNC: 17 MMOL/L
AST SERPL-CCNC: 19 U/L
BILIRUB DIRECT SERPL-MCNC: 0.1 MG/DL
BILIRUB INDIRECT SERPL-MCNC: 0.3 MG/DL
BILIRUB SERPL-MCNC: 0.4 MG/DL
BUN SERPL-MCNC: 61 MG/DL
CALCIUM SERPL-MCNC: 7.7 MG/DL
CHLORIDE SERPL-SCNC: 87 MMOL/L
CO2 SERPL-SCNC: 26 MMOL/L
CREAT SERPL-MCNC: 6.42 MG/DL
GLUCOSE SERPL-MCNC: 399 MG/DL
NT-PROBNP SERPL-MCNC: ABNORMAL PG/ML
POTASSIUM SERPL-SCNC: 4.6 MMOL/L
PROT SERPL-MCNC: 6.4 G/DL
SODIUM SERPL-SCNC: 130 MMOL/L

## 2017-02-05 ENCOUNTER — NON-APPOINTMENT (OUTPATIENT)
Age: 62
End: 2017-02-05

## 2017-02-05 PROBLEM — I25.2 HISTORY OF MYOCARDIAL INFARCTION: Status: RESOLVED | Noted: 2017-01-31 | Resolved: 2017-02-05

## 2017-02-05 PROBLEM — I25.10 CORONARY ARTERY DISEASE: Status: ACTIVE | Noted: 2017-01-31

## 2017-02-15 ENCOUNTER — APPOINTMENT (OUTPATIENT)
Dept: INFECTIOUS DISEASE | Facility: CLINIC | Age: 62
End: 2017-02-15

## 2017-06-01 ENCOUNTER — OUTPATIENT (OUTPATIENT)
Dept: OUTPATIENT SERVICES | Facility: HOSPITAL | Age: 62
LOS: 1 days | End: 2017-06-01
Payer: MEDICAID

## 2017-06-25 ENCOUNTER — INPATIENT (INPATIENT)
Facility: HOSPITAL | Age: 62
LOS: 1 days | Discharge: ANOTHER HOSPITAL | End: 2017-06-27
Attending: INTERNAL MEDICINE | Admitting: INTERNAL MEDICINE
Payer: MEDICARE

## 2017-06-25 VITALS
RESPIRATION RATE: 20 BRPM | OXYGEN SATURATION: 100 % | HEIGHT: 68 IN | SYSTOLIC BLOOD PRESSURE: 123 MMHG | HEART RATE: 64 BPM | TEMPERATURE: 98 F | DIASTOLIC BLOOD PRESSURE: 61 MMHG | WEIGHT: 149.91 LBS

## 2017-06-25 DIAGNOSIS — I10 ESSENTIAL (PRIMARY) HYPERTENSION: ICD-10-CM

## 2017-06-25 DIAGNOSIS — R06.09 OTHER FORMS OF DYSPNEA: ICD-10-CM

## 2017-06-25 DIAGNOSIS — N18.6 END STAGE RENAL DISEASE: ICD-10-CM

## 2017-06-25 DIAGNOSIS — E11.22 TYPE 2 DIABETES MELLITUS WITH DIABETIC CHRONIC KIDNEY DISEASE: ICD-10-CM

## 2017-06-25 DIAGNOSIS — I50.21 ACUTE SYSTOLIC (CONGESTIVE) HEART FAILURE: ICD-10-CM

## 2017-06-25 LAB
ALBUMIN SERPL ELPH-MCNC: 3.3 G/DL — SIGNIFICANT CHANGE UP (ref 3.3–5)
ALP SERPL-CCNC: 152 U/L — HIGH (ref 40–120)
ALT FLD-CCNC: 58 U/L — SIGNIFICANT CHANGE UP (ref 12–78)
ANION GAP SERPL CALC-SCNC: 13 MMOL/L — SIGNIFICANT CHANGE UP (ref 5–17)
ANISOCYTOSIS BLD QL: SLIGHT — SIGNIFICANT CHANGE UP
APTT BLD: 29.9 SEC — SIGNIFICANT CHANGE UP (ref 27.5–37.4)
AST SERPL-CCNC: 22 U/L — SIGNIFICANT CHANGE UP (ref 15–37)
BASE EXCESS BLDA CALC-SCNC: 1 MMOL/L — SIGNIFICANT CHANGE UP (ref -2–2)
BASOPHILS # BLD AUTO: 0 K/UL — SIGNIFICANT CHANGE UP (ref 0–0.2)
BILIRUB SERPL-MCNC: 0.5 MG/DL — SIGNIFICANT CHANGE UP (ref 0.2–1.2)
BLOOD GAS COMMENTS: SIGNIFICANT CHANGE UP
BLOOD GAS COMMENTS: SIGNIFICANT CHANGE UP
BLOOD GAS SOURCE: SIGNIFICANT CHANGE UP
BUN SERPL-MCNC: 70 MG/DL — HIGH (ref 7–23)
CALCIUM SERPL-MCNC: 7.9 MG/DL — LOW (ref 8.5–10.1)
CHLORIDE SERPL-SCNC: 91 MMOL/L — LOW (ref 96–108)
CK MB BLD-MCNC: 1.4 % — SIGNIFICANT CHANGE UP (ref 0–3.5)
CK MB CFR SERPL CALC: 1 NG/ML — SIGNIFICANT CHANGE UP (ref 0.5–3.6)
CK SERPL-CCNC: 73 U/L — SIGNIFICANT CHANGE UP (ref 26–308)
CO2 SERPL-SCNC: 27 MMOL/L — SIGNIFICANT CHANGE UP (ref 22–31)
CREAT SERPL-MCNC: 7.84 MG/DL — HIGH (ref 0.5–1.3)
EOSINOPHIL # BLD AUTO: 1.2 K/UL — HIGH (ref 0–0.5)
EOSINOPHIL NFR BLD AUTO: 15 % — HIGH (ref 0–6)
GLUCOSE SERPL-MCNC: 282 MG/DL — HIGH (ref 70–99)
HCO3 BLDA-SCNC: 25 MMOL/L — SIGNIFICANT CHANGE UP (ref 21–29)
HCT VFR BLD CALC: 29.4 % — LOW (ref 39–50)
HGB BLD-MCNC: 10.3 G/DL — LOW (ref 13–17)
HOROWITZ INDEX BLDA+IHG-RTO: 32 — SIGNIFICANT CHANGE UP
HYPOCHROMIA BLD QL: SLIGHT — SIGNIFICANT CHANGE UP
INR BLD: 1.05 RATIO — SIGNIFICANT CHANGE UP (ref 0.88–1.16)
LYMPHOCYTES # BLD AUTO: 0.8 K/UL — LOW (ref 1–3.3)
LYMPHOCYTES # BLD AUTO: 17 % — SIGNIFICANT CHANGE UP (ref 13–44)
MACROCYTES BLD QL: SLIGHT — SIGNIFICANT CHANGE UP
MCHC RBC-ENTMCNC: 31.6 PG — SIGNIFICANT CHANGE UP (ref 27–34)
MCHC RBC-ENTMCNC: 35 GM/DL — SIGNIFICANT CHANGE UP (ref 32–36)
MCV RBC AUTO: 90.2 FL — SIGNIFICANT CHANGE UP (ref 80–100)
MICROCYTES BLD QL: SLIGHT — SIGNIFICANT CHANGE UP
MONOCYTES # BLD AUTO: 0.5 K/UL — SIGNIFICANT CHANGE UP (ref 0–0.9)
MONOCYTES NFR BLD AUTO: 3 % — SIGNIFICANT CHANGE UP (ref 2–14)
NEUTROPHILS # BLD AUTO: 4 K/UL — SIGNIFICANT CHANGE UP (ref 1.8–7.4)
NEUTROPHILS NFR BLD AUTO: 65 % — SIGNIFICANT CHANGE UP (ref 43–77)
NRBC # BLD: 1 /100 — HIGH (ref 0–0)
NT-PROBNP SERPL-SCNC: HIGH PG/ML (ref 0–125)
OVALOCYTES BLD QL SMEAR: SLIGHT — SIGNIFICANT CHANGE UP
PCO2 BLDA: 38 MMHG — SIGNIFICANT CHANGE UP (ref 32–46)
PH BLD: 7.43 — SIGNIFICANT CHANGE UP (ref 7.35–7.45)
PLAT MORPH BLD: NORMAL — SIGNIFICANT CHANGE UP
PLATELET # BLD AUTO: 141 K/UL — LOW (ref 150–400)
PO2 BLDA: 150 MMHG — HIGH (ref 74–108)
POLYCHROMASIA BLD QL SMEAR: SLIGHT — SIGNIFICANT CHANGE UP
POTASSIUM SERPL-MCNC: 5.1 MMOL/L — SIGNIFICANT CHANGE UP (ref 3.5–5.3)
POTASSIUM SERPL-SCNC: 5.1 MMOL/L — SIGNIFICANT CHANGE UP (ref 3.5–5.3)
PROT SERPL-MCNC: 6.8 GM/DL — SIGNIFICANT CHANGE UP (ref 6–8.3)
PROTHROM AB SERPL-ACNC: 11.5 SEC — SIGNIFICANT CHANGE UP (ref 9.8–12.7)
RBC # BLD: 3.26 M/UL — LOW (ref 4.2–5.8)
RBC # FLD: 14.8 % — SIGNIFICANT CHANGE UP (ref 11–15)
RBC BLD AUTO: ABNORMAL
SAO2 % BLDA: 99 % — HIGH (ref 92–96)
SODIUM SERPL-SCNC: 131 MMOL/L — LOW (ref 135–145)
TROPONIN I SERPL-MCNC: 0.03 NG/ML — SIGNIFICANT CHANGE UP (ref 0.01–0.04)
WBC # BLD: 6.5 K/UL — SIGNIFICANT CHANGE UP (ref 3.8–10.5)
WBC # FLD AUTO: 6.5 K/UL — SIGNIFICANT CHANGE UP (ref 3.8–10.5)

## 2017-06-25 PROCEDURE — 99285 EMERGENCY DEPT VISIT HI MDM: CPT

## 2017-06-25 PROCEDURE — 71010: CPT | Mod: 26

## 2017-06-25 RX ORDER — GLUCAGON INJECTION, SOLUTION 0.5 MG/.1ML
1 INJECTION, SOLUTION SUBCUTANEOUS ONCE
Qty: 0 | Refills: 0 | Status: DISCONTINUED | OUTPATIENT
Start: 2017-06-25 | End: 2017-06-27

## 2017-06-25 RX ORDER — PANTOPRAZOLE SODIUM 20 MG/1
40 TABLET, DELAYED RELEASE ORAL
Qty: 0 | Refills: 0 | Status: DISCONTINUED | OUTPATIENT
Start: 2017-06-25 | End: 2017-06-27

## 2017-06-25 RX ORDER — DEXTROSE 50 % IN WATER 50 %
1 SYRINGE (ML) INTRAVENOUS ONCE
Qty: 0 | Refills: 0 | Status: DISCONTINUED | OUTPATIENT
Start: 2017-06-25 | End: 2017-06-27

## 2017-06-25 RX ORDER — LOSARTAN POTASSIUM 100 MG/1
50 TABLET, FILM COATED ORAL DAILY
Qty: 0 | Refills: 0 | Status: DISCONTINUED | OUTPATIENT
Start: 2017-06-25 | End: 2017-06-27

## 2017-06-25 RX ORDER — DEXTROSE 50 % IN WATER 50 %
25 SYRINGE (ML) INTRAVENOUS ONCE
Qty: 0 | Refills: 0 | Status: DISCONTINUED | OUTPATIENT
Start: 2017-06-25 | End: 2017-06-27

## 2017-06-25 RX ORDER — INSULIN LISPRO 100/ML
VIAL (ML) SUBCUTANEOUS
Qty: 0 | Refills: 0 | Status: DISCONTINUED | OUTPATIENT
Start: 2017-06-25 | End: 2017-06-27

## 2017-06-25 RX ORDER — INSULIN LISPRO 100/ML
VIAL (ML) SUBCUTANEOUS AT BEDTIME
Qty: 0 | Refills: 0 | Status: DISCONTINUED | OUTPATIENT
Start: 2017-06-25 | End: 2017-06-27

## 2017-06-25 RX ORDER — SIMVASTATIN 20 MG/1
20 TABLET, FILM COATED ORAL AT BEDTIME
Qty: 0 | Refills: 0 | Status: DISCONTINUED | OUTPATIENT
Start: 2017-06-25 | End: 2017-06-27

## 2017-06-25 RX ORDER — SODIUM CHLORIDE 9 MG/ML
1000 INJECTION, SOLUTION INTRAVENOUS
Qty: 0 | Refills: 0 | Status: DISCONTINUED | OUTPATIENT
Start: 2017-06-25 | End: 2017-06-27

## 2017-06-25 RX ORDER — SODIUM CHLORIDE 9 MG/ML
3 INJECTION INTRAMUSCULAR; INTRAVENOUS; SUBCUTANEOUS ONCE
Qty: 0 | Refills: 0 | Status: COMPLETED | OUTPATIENT
Start: 2017-06-25 | End: 2017-06-25

## 2017-06-25 RX ORDER — ASPIRIN/CALCIUM CARB/MAGNESIUM 324 MG
81 TABLET ORAL DAILY
Qty: 0 | Refills: 0 | Status: DISCONTINUED | OUTPATIENT
Start: 2017-06-25 | End: 2017-06-27

## 2017-06-25 RX ORDER — INSULIN GLARGINE 100 [IU]/ML
20 INJECTION, SOLUTION SUBCUTANEOUS AT BEDTIME
Qty: 0 | Refills: 0 | Status: DISCONTINUED | OUTPATIENT
Start: 2017-06-25 | End: 2017-06-27

## 2017-06-25 RX ORDER — DEXTROSE 50 % IN WATER 50 %
12.5 SYRINGE (ML) INTRAVENOUS ONCE
Qty: 0 | Refills: 0 | Status: DISCONTINUED | OUTPATIENT
Start: 2017-06-25 | End: 2017-06-27

## 2017-06-25 RX ORDER — LABETALOL HCL 100 MG
200 TABLET ORAL
Qty: 0 | Refills: 0 | Status: DISCONTINUED | OUTPATIENT
Start: 2017-06-25 | End: 2017-06-27

## 2017-06-25 RX ORDER — HYDRALAZINE HCL 50 MG
50 TABLET ORAL THREE TIMES A DAY
Qty: 0 | Refills: 0 | Status: DISCONTINUED | OUTPATIENT
Start: 2017-06-25 | End: 2017-06-27

## 2017-06-25 RX ADMIN — SODIUM CHLORIDE 3 MILLILITER(S): 9 INJECTION INTRAMUSCULAR; INTRAVENOUS; SUBCUTANEOUS at 12:27

## 2017-06-25 NOTE — CONSULT NOTE ADULT - SUBJECTIVE AND OBJECTIVE BOX
Patient is a 62y old  Male who presents with a chief complaint of  SOB.     HPI: 60yo M w PMH of DM, HTN, HL, ESRD on HD (mwf) at [facility] - Dr. Ivey came to ER with c/o SOB. Pt denies any chest pain. No N/V.   Family at bedside. Pt was found to have low pulse ox 86%. Currently feeling better with oxygen.       PAST MEDICAL HISTORY:  Hypertension  Diabetes mellitus  CAD      PAST SURGICAL HISTORY:  AVF    FAMILY HISTORY:  No pertinent family history in first degree relatives      SOCIAL HISTORY: No smoking or alcohol use     Allergies    No Known Allergies    Intolerances      MEDICATIONS  (STANDING):    MEDICATIONS  (PRN):          REVIEW OF SYSTEMS:  General: NAD  Respiratory: + cough, SOB  Cardiovascular: No CP or Palpitations	  Gastrointestinal: No nausea, Vomiting. No diarrhea  Genitourinary: No urinary complaints	  Musculoskeletal: No edema, No new rash or lesions		    T(F): 98, Max: 98 (06-25 @ 10:56)  HR: 62 (62 - 64)  BP: 123/61 (123/61 - 123/61)  RR: 18 (18 - 20)  SpO2: 100% (100% - 100%)  Wt(kg): --    PHYSICAL EXAM:  General: NAD  Respiratory: b/l air entry, basal rales  Cardiovascular: S1 S2  Gastrointestinal: soft  Extremities: no edema, Lt AVF        06-25    131<L>  |  91<L>  |  70<H>  ----------------------------<  282<H>  5.1   |  27  |  7.84<H>    Ca    7.9<L>      25 Jun 2017 12:13    TPro  6.8  /  Alb  3.3  /  TBili  0.5  /  DBili  x   /  AST  22  /  ALT  58  /  AlkPhos  152<H>  06-25                          10.3   6.5   )-----------( 141      ( 25 Jun 2017 12:13 )             29.4       Potassium, Serum: 5.1 mmol/L (06-25 @ 12:13)  Blood Urea Nitrogen, Serum: 70 mg/dL (06-25 @ 12:13)  Calcium, Total Serum: 7.9 mg/dL (06-25 @ 12:13)  Hemoglobin: 10.3 g/dL (06-25 @ 12:13)      Creatinine, Serum: 7.84 (06-25 @ 12:13)        LIVER FUNCTIONS - ( 25 Jun 2017 12:13 )  Alb: 3.3 g/dL / Pro: 6.8 gm/dL / ALK PHOS: 152 U/L / ALT: 58 U/L / AST: 22 U/L / GGT: x           CARDIAC MARKERS ( 25 Jun 2017 12:13 )  .029 ng/mL / x     / 73 U/L / x     / 1.0 ng/mL      Creatine Kinase, Serum: 73 U/L (06-25 @ 12:13)          ABG - ( 25 Jun 2017 14:57 )  pH: x     /  pCO2: 38    /  pO2: 150   / HCO3: 25    / Base Excess: 1.0   /  SaO2: 99 Patient is a 62y old  Male who presents with a chief complaint of  SOB.     HPI: 62yo M w PMH of DM, HTN, HL, ESRD on HD (mwf) at [facility] - Dr. Ivey came to ER with c/o SOB. Pt denies any chest pain. No N/V.   Family at bedside. Pt was found to have low pulse ox 86%. Currently feeling better with oxygen.       PAST MEDICAL HISTORY:  Hypertension  Diabetes mellitus  CAD      PAST SURGICAL HISTORY:  AVF    FAMILY HISTORY:  No pertinent family history in first degree relatives      SOCIAL HISTORY: No smoking or alcohol use     Allergies    No Known Allergies    Intolerances      MEDICATIONS  (STANDING):    MEDICATIONS  (PRN):          REVIEW OF SYSTEMS:  General: NAD  Respiratory: + cough, SOB  Cardiovascular: No CP or Palpitations	  Gastrointestinal: No nausea, Vomiting. No diarrhea  Genitourinary: No urinary complaints	  Musculoskeletal: No edema, No new rash or lesions		    T(F): 98, Max: 98 (06-25 @ 10:56)  HR: 62 (62 - 64)  BP: 123/61 (123/61 - 123/61)  RR: 18 (18 - 20)  SpO2: 100% (100% - 100%)  Wt(kg): --    PHYSICAL EXAM:  General: NAD  Respiratory: b/l air entry, basal rales  Cardiovascular: S1 S2  Gastrointestinal: soft  Extremities: no edema, Lt AVF, + bruit        06-25    131<L>  |  91<L>  |  70<H>  ----------------------------<  282<H>  5.1   |  27  |  7.84<H>    Ca    7.9<L>      25 Jun 2017 12:13    TPro  6.8  /  Alb  3.3  /  TBili  0.5  /  DBili  x   /  AST  22  /  ALT  58  /  AlkPhos  152<H>  06-25                          10.3   6.5   )-----------( 141      ( 25 Jun 2017 12:13 )             29.4       Potassium, Serum: 5.1 mmol/L (06-25 @ 12:13)  Blood Urea Nitrogen, Serum: 70 mg/dL (06-25 @ 12:13)  Calcium, Total Serum: 7.9 mg/dL (06-25 @ 12:13)  Hemoglobin: 10.3 g/dL (06-25 @ 12:13)      Creatinine, Serum: 7.84 (06-25 @ 12:13)        LIVER FUNCTIONS - ( 25 Jun 2017 12:13 )  Alb: 3.3 g/dL / Pro: 6.8 gm/dL / ALK PHOS: 152 U/L / ALT: 58 U/L / AST: 22 U/L / GGT: x           CARDIAC MARKERS ( 25 Jun 2017 12:13 )  .029 ng/mL / x     / 73 U/L / x     / 1.0 ng/mL      Creatine Kinase, Serum: 73 U/L (06-25 @ 12:13)          ABG - ( 25 Jun 2017 14:57 )  pH: x     /  pCO2: 38    /  pO2: 150   / HCO3: 25    / Base Excess: 1.0   /  SaO2: 99

## 2017-06-25 NOTE — CONSULT NOTE ADULT - SUBJECTIVE AND OBJECTIVE BOX
Patient is a 62y old  Male who presents with a chief complaint of       PAST MEDICAL & SURGICAL HISTORY:  Hypertension  Diabetes mellitus  No significant past surgical history      Summary of admission HPI: SOB CHF                PREVIOUS DIAGNOSTIC TESTING:      ECHO  FINDINGS:    STRESS  FINDINGS:    CATHETERIZATION  FINDINGS:    ELECTROPHYSIOLOGY STUDY  FINDINGS:    CAROTID ULTRASOUND:  FINDINGS    VENOUS DUPLEX SCAN:  FINDINGS:    CHEST CT PULMONARY ANGIO with IV Contrast:  FINDINGS:  MEDICATIONS  (STANDING):  aspirin enteric coated 81milliGRAM(s) Oral daily  losartan 50milliGRAM(s) Oral daily  insulin glargine Injectable (LANTUS) 20Unit(s) SubCutaneous at bedtime  simvastatin 20milliGRAM(s) Oral at bedtime  labetalol 200milliGRAM(s) Oral two times a day  pantoprazole    Tablet 40milliGRAM(s) Oral before breakfast  hydrALAZINE 50milliGRAM(s) Oral three times a day    MEDICATIONS  (PRN):      FAMILY HISTORY:  No pertinent family history in first degree relatives      SOCIAL HISTORY:    CIGARETTES:    ALCOHOL:    REVIEW OF SYSTEMS:    CONSTITUTIONAL: No fever, weight loss, chills, shakes, or fatigue  EYES: No eye pain, visual disturbances, or discharge  ENMT:  No difficulty hearing, tinnitus, vertigo; No sinus or throat pain  NECK: No pain or stiffness  BREASTS: No pain, masses, or nipple discharge  RESPIRATORY: No cough, wheezing, hemoptysis, or shortness of breath  CARDIOVASCULAR: No chest pain, dyspnea, palpitations, dizziness, syncope, paroxysmal nocturnal dyspnea, orthopnea, or arm or leg swelling  GASTROINTESTINAL: No abdominal  or epigastric pain, nausea, vomiting, hematemesis, diarrhea, constipation, melena or bright red blood.  GENITOURINARY: No dysuria, nocturia, hematuria, or urinary incontinence  NEUROLOGICAL: No headaches, memory loss, slurred speech, limb weakness, loss of strength, numbness, or tremors  SKIN: No itching, burning, rashes, or lesions   LYMPH NODES: No enlarged glands  ENDOCRINE: No heat or cold intolerance, or hair loss  MUSCULOSKELETAL: No joint pain or swelling, muscle, back, or extremity pain  PSYCHIATRIC: No depression, anxiety, or difficulty sleeping  HEME/LYMPH: No easy bruising or bleeding gums  ALLERY AND IMMUNOLOGIC: No hives or rash.      Vital Signs Last 24 Hrs  T(C): 36.8, Max: 36.8 (06-25 @ 15:54)  T(F): 98.2, Max: 98.2 (06-25 @ 15:54)  HR: 61 (61 - 64)  BP: 140/75 (123/61 - 140/75)  BP(mean): --  RR: 16 (16 - 20)  SpO2: 100% (100% - 100%)    PHYSICAL EXAM:    GENERAL: In no apparent distress, well nourished, and hydrated.  HEAD:  Atraumatic, Normocephalic  EYES: EOMI, PERRLA, conjunctiva and sclera clear  ENMT: No tonsillar erythema, exudates, or enlargement; Moist mucous membranes, Good dentition, No lesions  NECK: Supple and normal thyroid.  No JVD or carotid bruit.  Carotid pulse is 2+ bilaterally.  HEART: Regular rate and rhythm; No murmurs, rubs, or gallops.  PULMONARY: Clear to auscultation and perfusion.  No rales, wheezing, or rhonchi bilaterally.  ABDOMEN: Soft, Nontender, Nondistended; Bowel sounds present  EXTREMITIES:  2+ Peripheral Pulses, No clubbing, cyanosis, or edema  LYMPH: No lymphadenopathy noted  NEUROLOGICAL: Grossly nonfocal          INTERPRETATION OF TELEMETRY:    ECG:    I&O's Detail      LABS:                        10.3   6.5   )-----------( 141      ( 25 Jun 2017 12:13 )             29.4     06-25    131<L>  |  91<L>  |  70<H>  ----------------------------<  282<H>  5.1   |  27  |  7.84<H>    Ca    7.9<L>      25 Jun 2017 12:13    TPro  6.8  /  Alb  3.3  /  TBili  0.5  /  DBili  x   /  AST  22  /  ALT  58  /  AlkPhos  152<H>  06-25    CARDIAC MARKERS ( 25 Jun 2017 12:13 )  .029 ng/mL / x     / 73 U/L / x     / 1.0 ng/mL      PT/INR - ( 25 Jun 2017 12:13 )   PT: 11.5 sec;   INR: 1.05 ratio         PTT - ( 25 Jun 2017 12:13 )  PTT:29.9 sec    BNPSerum Pro-Brain Natriuretic Peptide: 31998 pg/mL (06-25 @ 12:13)    I&O's Detail    Daily Height in cm: 172.72 (25 Jun 2017 10:56)    Daily     RADIOLOGY & ADDITIONAL STUDIES:

## 2017-06-25 NOTE — ED PROVIDER NOTE - OBJECTIVE STATEMENT
62 year old male with h/o HTN and DM presents today c/o SOB 62 year old male with h/o HTN, ESRD (on dialysis) and DM presents today c/o SOB x 1 day, pt is compliant with dialysis, c/o dyspnea with any movement (-) chest pains (-) leg edema (-) cough (-) fevers

## 2017-06-25 NOTE — ED ADULT NURSE NOTE - CHPI ED SYMPTOMS NEG
no headache/no fever/no body aches/no chills/no hemoptysis/no chest pain/no cough/no wheezing/no diaphoresis

## 2017-06-25 NOTE — ED ADULT TRIAGE NOTE - CHIEF COMPLAINT QUOTE
patient c/o of difficulty breathing started yesterday , patient denied chest pain no other pain , dialysis patient , last dialysis Friday as per EMS Sat was 86 % on RA ,

## 2017-06-25 NOTE — H&P ADULT - NSHPLABSRESULTS_GEN_ALL_CORE
CARDIAC MARKERS ( 25 Jun 2017 12:13 )  .029 ng/mL / x     / 73 U/L / x     / 1.0 ng/mL    25 Jun 2017 12:13    131    |  91     |  70     ----------------------------<  282    5.1     |  27     |  7.84     Ca    7.9        25 Jun 2017 12:13    TPro  6.8    /  Alb  3.3    /  TBili  0.5    /  DBili  x      /  AST  22     /  ALT  58     /  AlkPhos  152    25 Jun 2017 12:13    EXAM:  CHEST SINGLE VIEW                            PROCEDURE DATE:  06/25/2017        INTERPRETATION:  CLINICAL INFORMATION:sob, h/o esrd    TECHNIQUE: AP chest film. Comparison is made to 6/7/2016    FINDINGS:There is no focal consolidation or pleural effusion.  Heart size   is enlarged. The osseous structures are intact.    IMPRESSION: Cardiomegaly.

## 2017-06-26 LAB
ANION GAP SERPL CALC-SCNC: 12 MMOL/L — SIGNIFICANT CHANGE UP (ref 5–17)
BUN SERPL-MCNC: 71 MG/DL — HIGH (ref 7–23)
CALCIUM SERPL-MCNC: 7.8 MG/DL — LOW (ref 8.5–10.1)
CHLORIDE SERPL-SCNC: 97 MMOL/L — SIGNIFICANT CHANGE UP (ref 96–108)
CO2 SERPL-SCNC: 26 MMOL/L — SIGNIFICANT CHANGE UP (ref 22–31)
CREAT SERPL-MCNC: 7.7 MG/DL — HIGH (ref 0.5–1.3)
GLUCOSE SERPL-MCNC: 135 MG/DL — HIGH (ref 70–99)
HAV IGM SER-ACNC: SIGNIFICANT CHANGE UP
HBA1C BLD-MCNC: 11.8 % — HIGH (ref 4–5.6)
HBV CORE IGM SER-ACNC: SIGNIFICANT CHANGE UP
HBV SURFACE AB SER-ACNC: <3 MIU/ML — LOW
HBV SURFACE AG SER-ACNC: SIGNIFICANT CHANGE UP
HCV AB S/CO SERPL IA: 0.13 S/CO — SIGNIFICANT CHANGE UP
HCV AB SERPL-IMP: SIGNIFICANT CHANGE UP
POTASSIUM SERPL-MCNC: 4.4 MMOL/L — SIGNIFICANT CHANGE UP (ref 3.5–5.3)
POTASSIUM SERPL-SCNC: 4.4 MMOL/L — SIGNIFICANT CHANGE UP (ref 3.5–5.3)
SODIUM SERPL-SCNC: 135 MMOL/L — SIGNIFICANT CHANGE UP (ref 135–145)

## 2017-06-26 RX ADMIN — PANTOPRAZOLE SODIUM 40 MILLIGRAM(S): 20 TABLET, DELAYED RELEASE ORAL at 07:54

## 2017-06-26 RX ADMIN — LOSARTAN POTASSIUM 50 MILLIGRAM(S): 100 TABLET, FILM COATED ORAL at 05:21

## 2017-06-26 RX ADMIN — INSULIN GLARGINE 20 UNIT(S): 100 INJECTION, SOLUTION SUBCUTANEOUS at 00:17

## 2017-06-26 RX ADMIN — Medication 81 MILLIGRAM(S): at 11:41

## 2017-06-26 RX ADMIN — INSULIN GLARGINE 20 UNIT(S): 100 INJECTION, SOLUTION SUBCUTANEOUS at 22:19

## 2017-06-26 RX ADMIN — Medication 1: at 17:52

## 2017-06-26 RX ADMIN — Medication 50 MILLIGRAM(S): at 22:19

## 2017-06-26 RX ADMIN — SIMVASTATIN 20 MILLIGRAM(S): 20 TABLET, FILM COATED ORAL at 00:16

## 2017-06-26 RX ADMIN — Medication 50 MILLIGRAM(S): at 00:17

## 2017-06-26 RX ADMIN — Medication 200 MILLIGRAM(S): at 05:20

## 2017-06-26 RX ADMIN — SIMVASTATIN 20 MILLIGRAM(S): 20 TABLET, FILM COATED ORAL at 22:19

## 2017-06-26 RX ADMIN — Medication 2: at 11:39

## 2017-06-26 NOTE — PROGRESS NOTE ADULT - SUBJECTIVE AND OBJECTIVE BOX
INTERVAL HPI/OVERNIGHT EVENTS:      Tolerated dialysis yesterday.        Antimicrobial:    Cardiovascular:  losartan 50milliGRAM(s) Oral daily  labetalol 200milliGRAM(s) Oral two times a day  hydrALAZINE 50milliGRAM(s) Oral three times a day    Pulmonary:    Hematalogic:  aspirin enteric coated 81milliGRAM(s) Oral daily    Other:  insulin glargine Injectable (LANTUS) 20Unit(s) SubCutaneous at bedtime  simvastatin 20milliGRAM(s) Oral at bedtime  pantoprazole    Tablet 40milliGRAM(s) Oral before breakfast  insulin lispro (HumaLOG) corrective regimen sliding scale  SubCutaneous three times a day before meals  insulin lispro (HumaLOG) corrective regimen sliding scale  SubCutaneous at bedtime  dextrose 5%. 1000milliLiter(s) IV Continuous <Continuous>  dextrose Gel 1Dose(s) Oral once PRN  dextrose 50% Injectable 12.5Gram(s) IV Push once  dextrose 50% Injectable 25Gram(s) IV Push once  dextrose 50% Injectable 25Gram(s) IV Push once  glucagon  Injectable 1milliGRAM(s) IntraMuscular once PRN    aspirin enteric coated 81milliGRAM(s) Oral daily  losartan 50milliGRAM(s) Oral daily  insulin glargine Injectable (LANTUS) 20Unit(s) SubCutaneous at bedtime  simvastatin 20milliGRAM(s) Oral at bedtime  labetalol 200milliGRAM(s) Oral two times a day  pantoprazole    Tablet 40milliGRAM(s) Oral before breakfast  hydrALAZINE 50milliGRAM(s) Oral three times a day  insulin lispro (HumaLOG) corrective regimen sliding scale  SubCutaneous three times a day before meals  insulin lispro (HumaLOG) corrective regimen sliding scale  SubCutaneous at bedtime  dextrose 5%. 1000milliLiter(s) IV Continuous <Continuous>  dextrose Gel 1Dose(s) Oral once PRN  dextrose 50% Injectable 12.5Gram(s) IV Push once  dextrose 50% Injectable 25Gram(s) IV Push once  dextrose 50% Injectable 25Gram(s) IV Push once  glucagon  Injectable 1milliGRAM(s) IntraMuscular once PRN    Drug Dosing Weight  Height (cm): 172.7 (25 Jun 2017 22:30)  Weight (kg): 66.3 (25 Jun 2017 22:30)  BMI (kg/m2): 22.2 (25 Jun 2017 22:30)  BSA (m2): 1.79 (25 Jun 2017 22:30)        VIGIL: [ ] YES [ ] NO    DATE INSERTED:  REMOVE:  [ ] YES [ ] NO  EXPLAIN:      PAST MEDICAL & SURGICAL HISTORY:  Hypertension  Diabetes mellitus  No significant past surgical history      REVIEW OF SYSTEMS:    CONSTITUTIONAL: No fever, weight loss, or fatigue  EYES: No eye pain, visual disturbances, or discharge  ENMT:  No difficulty hearing, tinnitus, vertigo; No sinus or throat pain  NECK: No pain or stiffness  BREASTS: No pain, masses, or nipple discharge  RESPIRATORY: No cough, wheezing, chills or hemoptysis; No shortness of breath  CARDIOVASCULAR: No chest pain, palpitations, dizziness, or leg swelling  GASTROINTESTINAL: No abdominal or epigastric pain. No nausea, vomiting, or hematemesis; No diarrhea or constipation. No melena or hematochezia.  GENITOURINARY: No dysuria, frequency, hematuria, or incontinence  NEUROLOGICAL: No headaches, memory loss, loss of strength, numbness, or tremors  SKIN: No itching, burning, rashes, or lesions   LYMPH NODES: No enlarged glands  ENDOCRINE: No heat or cold intolerance; No hair loss  MUSCULOSKELETAL: No joint pain or swelling; No muscle, back, or extremity pain  PSYCHIATRIC: No depression, anxiety, mood swings, or difficulty sleeping  HEME/LYMPH: No easy bruising, or bleeding gums  ALLERGY AND IMMUNOLOGIC: No hives or eczema      T(C): 36.9, Max: 36.9 (06-26 @ 13:11)  HR: 69 (58 - 75)  BP: 123/66 (100/56 - 140/75)  RR: 18 (16 - 18)  SpO2: 99% (99% - 100%)  Wt(kg): --    ABG - ( 25 Jun 2017 14:57 )  pH: x     /  pCO2: 38    /  pO2: 150   / HCO3: 25    / Base Excess: 1.0   /  SaO2: 99                  I&O's Detail    I & Os for current day (as of 26 Jun 2017 14:24)  =============================================  IN:    Oral Fluid: 500 ml    Total IN: 500 ml  ---------------------------------------------  OUT:    Other: 2000 ml    Voided: 300 ml    Total OUT: 2300 ml  ---------------------------------------------  Total NET: -1800 ml        PHYSICAL EXAM:    GENERAL: NAD, well-groomed, well-developed  HEAD:  Atraumatic, Normocephalic  EYES: EOMI, PERRLA, conjunctiva and sclera clear  ENMT: No tonsillar erythema, exudates, or enlargement; Moist mucous membranes, Good dentition, No lesions  NECK: Supple, No JVD, Normal thyroid  NERVOUS SYSTEM:  Alert & Oriented X3, Good concentration; Motor Strength 5/5 B/L upper and lower extremities; DTRs 2+ intact and symmetric  CHEST/LUNG: Clear to percussion bilaterally; No rales, rhonchi, wheezing, or rubs  HEART: Regular rate and rhythm; No murmurs, rubs, or gallops  ABDOMEN: Soft, Nontender, Nondistended; Bowel sounds present  EXTREMITIES:  2+ Peripheral Pulses, No clubbing, cyanosis, or edema  LYMPH: No lymphadenopathy noted  SKIN: No rashes or lesions      LABS:  CBC Full  -  ( 25 Jun 2017 12:13 )  WBC Count : 6.5 K/uL  Hemoglobin : 10.3 g/dL  Hematocrit : 29.4 %  Platelet Count - Automated : 141 K/uL  Mean Cell Volume : 90.2 fl  Mean Cell Hemoglobin : 31.6 pg  Mean Cell Hemoglobin Concentration : 35.0 gm/dL  Auto Neutrophil # : 4.0 K/uL  Auto Lymphocyte # : 0.8 K/uL  Auto Monocyte # : 0.5 K/uL  Auto Eosinophil # : 1.2 K/uL  Auto Basophil # : 0.0 K/uL  Auto Neutrophil % : 65.0 %  Auto Lymphocyte % : 17.0 %  Auto Monocyte % : 3.0 %  Auto Eosinophil % : 15.0 %  Auto Basophil % : x    06-26    135  |  97  |  71<H>  ----------------------------<  135<H>  4.4   |  26  |  7.70<H>    Ca    7.8<L>      26 Jun 2017 10:58    TPro  6.8  /  Alb  3.3  /  TBili  0.5  /  DBili  x   /  AST  22  /  ALT  58  /  AlkPhos  152<H>  06-25    PT/INR - ( 25 Jun 2017 12:13 )   PT: 11.5 sec;   INR: 1.05 ratio         PTT - ( 25 Jun 2017 12:13 )  PTT:29.9 sec        RADIOLOGY & ADDITIONAL STUDIES:

## 2017-06-26 NOTE — PROGRESS NOTE ADULT - SUBJECTIVE AND OBJECTIVE BOX
Patient is a 62y old  Male who presents with a chief complaint of       PAST MEDICAL & SURGICAL HISTORY:  Hypertension  Diabetes mellitus  No significant past surgical history      Summary of admission HPI:                PREVIOUS DIAGNOSTIC TESTING:      ECHO  FINDINGS:    STRESS  FINDINGS:    CATHETERIZATION  FINDINGS:    ELECTROPHYSIOLOGY STUDY  FINDINGS:    CAROTID ULTRASOUND:  FINDINGS    VENOUS DUPLEX SCAN:  FINDINGS:    CHEST CT PULMONARY ANGIO with IV Contrast:  FINDINGS:  MEDICATIONS  (STANDING):  aspirin enteric coated 81milliGRAM(s) Oral daily  losartan 50milliGRAM(s) Oral daily  insulin glargine Injectable (LANTUS) 20Unit(s) SubCutaneous at bedtime  simvastatin 20milliGRAM(s) Oral at bedtime  labetalol 200milliGRAM(s) Oral two times a day  pantoprazole    Tablet 40milliGRAM(s) Oral before breakfast  hydrALAZINE 50milliGRAM(s) Oral three times a day  insulin lispro (HumaLOG) corrective regimen sliding scale  SubCutaneous three times a day before meals  insulin lispro (HumaLOG) corrective regimen sliding scale  SubCutaneous at bedtime  dextrose 5%. 1000milliLiter(s) IV Continuous <Continuous>  dextrose 50% Injectable 12.5Gram(s) IV Push once  dextrose 50% Injectable 25Gram(s) IV Push once  dextrose 50% Injectable 25Gram(s) IV Push once    MEDICATIONS  (PRN):  dextrose Gel 1Dose(s) Oral once PRN Blood Glucose LESS THAN 70 milliGRAM(s)/deciliter  glucagon  Injectable 1milliGRAM(s) IntraMuscular once PRN Glucose LESS THAN 70 milligrams/deciliter      FAMILY HISTORY:  No pertinent family history in first degree relatives      SOCIAL HISTORY:    CIGARETTES:    ALCOHOL:    REVIEW OF SYSTEMS:    CONSTITUTIONAL: No fever, weight loss, chills, shakes, or fatigue  EYES: No eye pain, visual disturbances, or discharge  ENMT:  No difficulty hearing, tinnitus, vertigo; No sinus or throat pain  NECK: No pain or stiffness  BREASTS: No pain, masses, or nipple discharge  RESPIRATORY: No cough, wheezing, hemoptysis, or shortness of breath  CARDIOVASCULAR: No chest pain, dyspnea, palpitations, dizziness, syncope, paroxysmal nocturnal dyspnea, orthopnea, or arm or leg swelling  GASTROINTESTINAL: No abdominal  or epigastric pain, nausea, vomiting, hematemesis, diarrhea, constipation, melena or bright red blood.  GENITOURINARY: No dysuria, nocturia, hematuria, or urinary incontinence  NEUROLOGICAL: No headaches, memory loss, slurred speech, limb weakness, loss of strength, numbness, or tremors  SKIN: No itching, burning, rashes, or lesions   LYMPH NODES: No enlarged glands  ENDOCRINE: No heat or cold intolerance, or hair loss  MUSCULOSKELETAL: No joint pain or swelling, muscle, back, or extremity pain  PSYCHIATRIC: No depression, anxiety, or difficulty sleeping  HEME/LYMPH: No easy bruising or bleeding gums  ALLERY AND IMMUNOLOGIC: No hives or rash.      Vital Signs Last 24 Hrs  T(C): 36.3, Max: 36.9 ( @ 13:11)  T(F): 97.4, Max: 98.4 ( @ 13:11)  HR: 59 (57 - 75)  BP: 119/57 (100/56 - 140/71)  BP(mean): --  RR: 17 (16 - 18)  SpO2: 100% (99% - 100%)    PHYSICAL EXAM:    GENERAL: In no apparent distress, well nourished, and hydrated.  HEAD:  Atraumatic, Normocephalic  EYES: EOMI, PERRLA, conjunctiva and sclera clear  ENMT: No tonsillar erythema, exudates, or enlargement; Moist mucous membranes, Good dentition, No lesions  NECK: Supple and normal thyroid.  No JVD or carotid bruit.  Carotid pulse is 2+ bilaterally.  HEART: Regular rate and rhythm; No murmurs, rubs, or gallops.  PULMONARY: Clear to auscultation and perfusion.  No rales, wheezing, or rhonchi bilaterally.  ABDOMEN: Soft, Nontender, Nondistended; Bowel sounds present  EXTREMITIES:  2+ Peripheral Pulses, No clubbing, cyanosis, or edema  LYMPH: No lymphadenopathy noted  NEUROLOGICAL: Grossly nonfocal          INTERPRETATION OF TELEMETRY:    ECG:    I&O's Detail  I & Os for 24h ending 2017 07:00  =============================================  IN:    Oral Fluid: 500 ml    Total IN: 500 ml  ---------------------------------------------  OUT:    Other: 2000 ml    Voided: 300 ml    Total OUT: 2300 ml  ---------------------------------------------  Total NET: -1800 ml    I & Os for current day (as of 2017 21:32)  =============================================  IN:    Oral Fluid: 420 ml    Total IN: 420 ml  ---------------------------------------------  OUT:    Other: 00004 ml    Total OUT: 66690 ml  ---------------------------------------------  Total NET: -88993 ml      LABS:                        10.3   6.5   )-----------( 141      ( 2017 12:13 )             29.4     06-26    135  |  97  |  71<H>  ----------------------------<  135<H>  4.4   |  26  |  7.70<H>    Ca    7.8<L>      2017 10:58    TPro  6.8  /  Alb  3.3  /  TBili  0.5  /  DBili  x   /  AST  22  /  ALT  58  /  AlkPhos  152<H>  06-25    CARDIAC MARKERS ( 2017 12:13 )  .029 ng/mL / x     / 73 U/L / x     / 1.0 ng/mL      PT/INR - ( 2017 12:13 )   PT: 11.5 sec;   INR: 1.05 ratio         PTT - ( 2017 12:13 )  PTT:29.9 sec    BNP  I&O's Detail  I & Os for 24h ending 2017 07:00  =============================================  IN:    Oral Fluid: 500 ml    Total IN: 500 ml  ---------------------------------------------  OUT:    Other: 2000 ml    Voided: 300 ml    Total OUT: 2300 ml  ---------------------------------------------  Total NET: -1800 ml    I & Os for current day (as of 2017 21:32)  =============================================  IN:    Oral Fluid: 420 ml    Total IN: 420 ml  ---------------------------------------------  OUT:    Other: 67518 ml    Total OUT: 68906 ml  ---------------------------------------------  Total NET: -74050 ml    Daily Height in cm: 172.72 (2017 22:30)    Daily Weight in k.1 (2017 17:05)    RADIOLOGY & ADDITIONAL STUDIES:

## 2017-06-26 NOTE — PROGRESS NOTE ADULT - SUBJECTIVE AND OBJECTIVE BOX
Patient for HD today. No new events. Post 2 hrs UF yesterday.  Tolerated HD treatments well.  HD prescription reviewed.        PHYSICAL EXAM:      T(C): 36.9, Max: 36.9 (06-26 @ 13:11)  HR: 69 (58 - 75)  BP: 123/66 (100/56 - 140/75)  RR: 18 (16 - 18)  SpO2: 99% (99% - 100%)  Wt(kg): --  Respiratory: clear anteriorly, decreased BS at bases  Cardiovascular: S1 S2  Gastrointestinal: soft NT ND +BS  Extremities:   tr edema                                          10.3   6.5   )-----------( 141      ( 25 Jun 2017 12:13 )             29.4     06-26    135  |  97  |  71<H>  ----------------------------<  135<H>  4.4   |  26  |  7.70<H>    Ca    7.8<L>      26 Jun 2017 10:58    TPro  6.8  /  Alb  3.3  /  TBili  0.5  /  DBili  x   /  AST  22  /  ALT  58  /  AlkPhos  152<H>  06-25      Maintenance hemodialysis today; MWF;  Follow blood pressure trends, BFR, AP, , UF goal.  Clinically stable.

## 2017-06-27 ENCOUNTER — INPATIENT (INPATIENT)
Facility: HOSPITAL | Age: 62
LOS: 1 days | Discharge: HOME | DRG: 246 | End: 2017-06-29
Attending: INTERNAL MEDICINE | Admitting: SPECIALIST
Payer: MEDICARE

## 2017-06-27 VITALS
OXYGEN SATURATION: 100 % | DIASTOLIC BLOOD PRESSURE: 63 MMHG | RESPIRATION RATE: 16 BRPM | HEIGHT: 68 IN | HEART RATE: 70 BPM | SYSTOLIC BLOOD PRESSURE: 139 MMHG | TEMPERATURE: 98 F | WEIGHT: 149.91 LBS

## 2017-06-27 VITALS
OXYGEN SATURATION: 99 % | SYSTOLIC BLOOD PRESSURE: 113 MMHG | RESPIRATION RATE: 16 BRPM | DIASTOLIC BLOOD PRESSURE: 57 MMHG | HEART RATE: 60 BPM | TEMPERATURE: 98 F

## 2017-06-27 DIAGNOSIS — T82.7XXA INFECTION AND INFLAMMATORY REACTION DUE TO OTHER CARDIAC AND VASCULAR DEVICES, IMPLANTS AND GRAFTS, INITIAL ENCOUNTER: Chronic | ICD-10-CM

## 2017-06-27 DIAGNOSIS — I21.4 NON-ST ELEVATION (NSTEMI) MYOCARDIAL INFARCTION: ICD-10-CM

## 2017-06-27 DIAGNOSIS — R07.9 CHEST PAIN, UNSPECIFIED: ICD-10-CM

## 2017-06-27 DIAGNOSIS — Z95.9 PRESENCE OF CARDIAC AND VASCULAR IMPLANT AND GRAFT, UNSPECIFIED: Chronic | ICD-10-CM

## 2017-06-27 DIAGNOSIS — Z98.890 OTHER SPECIFIED POSTPROCEDURAL STATES: Chronic | ICD-10-CM

## 2017-06-27 PROCEDURE — 93010 ELECTROCARDIOGRAM REPORT: CPT

## 2017-06-27 RX ORDER — CALCIUM ACETATE 667 MG
667 TABLET ORAL
Qty: 0 | Refills: 0 | Status: DISCONTINUED | OUTPATIENT
Start: 2017-06-27 | End: 2017-06-29

## 2017-06-27 RX ORDER — INSULIN LISPRO 100/ML
VIAL (ML) SUBCUTANEOUS
Qty: 0 | Refills: 0 | Status: DISCONTINUED | OUTPATIENT
Start: 2017-06-27 | End: 2017-06-28

## 2017-06-27 RX ORDER — SODIUM CHLORIDE 9 MG/ML
1000 INJECTION, SOLUTION INTRAVENOUS
Qty: 0 | Refills: 0 | Status: DISCONTINUED | OUTPATIENT
Start: 2017-06-27 | End: 2017-06-28

## 2017-06-27 RX ORDER — INSULIN LISPRO 100/ML
VIAL (ML) SUBCUTANEOUS AT BEDTIME
Qty: 0 | Refills: 0 | Status: DISCONTINUED | OUTPATIENT
Start: 2017-06-27 | End: 2017-06-28

## 2017-06-27 RX ORDER — ATORVASTATIN CALCIUM 80 MG/1
40 TABLET, FILM COATED ORAL AT BEDTIME
Qty: 0 | Refills: 0 | Status: DISCONTINUED | OUTPATIENT
Start: 2017-06-27 | End: 2017-06-29

## 2017-06-27 RX ORDER — HYDRALAZINE HCL 50 MG
50 TABLET ORAL THREE TIMES A DAY
Qty: 0 | Refills: 0 | Status: DISCONTINUED | OUTPATIENT
Start: 2017-06-27 | End: 2017-06-29

## 2017-06-27 RX ORDER — GLUCAGON INJECTION, SOLUTION 0.5 MG/.1ML
1 INJECTION, SOLUTION SUBCUTANEOUS ONCE
Qty: 0 | Refills: 0 | Status: DISCONTINUED | OUTPATIENT
Start: 2017-06-27 | End: 2017-06-28

## 2017-06-27 RX ORDER — CHOLECALCIFEROL (VITAMIN D3) 125 MCG
2000 CAPSULE ORAL DAILY
Qty: 0 | Refills: 0 | Status: DISCONTINUED | OUTPATIENT
Start: 2017-06-27 | End: 2017-06-29

## 2017-06-27 RX ORDER — DEXTROSE 50 % IN WATER 50 %
25 SYRINGE (ML) INTRAVENOUS ONCE
Qty: 0 | Refills: 0 | Status: DISCONTINUED | OUTPATIENT
Start: 2017-06-27 | End: 2017-06-28

## 2017-06-27 RX ORDER — DEXTROSE 50 % IN WATER 50 %
12.5 SYRINGE (ML) INTRAVENOUS ONCE
Qty: 0 | Refills: 0 | Status: DISCONTINUED | OUTPATIENT
Start: 2017-06-27 | End: 2017-06-28

## 2017-06-27 RX ORDER — INSULIN LISPRO 100/ML
4 VIAL (ML) SUBCUTANEOUS
Qty: 0 | Refills: 0 | Status: DISCONTINUED | OUTPATIENT
Start: 2017-06-27 | End: 2017-06-28

## 2017-06-27 RX ORDER — DEXTROSE 50 % IN WATER 50 %
25 SYRINGE (ML) INTRAVENOUS ONCE
Qty: 0 | Refills: 0 | Status: COMPLETED | OUTPATIENT
Start: 2017-06-27 | End: 2017-06-27

## 2017-06-27 RX ORDER — ERYTHROPOIETIN 10000 [IU]/ML
4000 INJECTION, SOLUTION INTRAVENOUS; SUBCUTANEOUS
Qty: 0 | Refills: 0 | Status: DISCONTINUED | OUTPATIENT
Start: 2017-06-28 | End: 2017-06-29

## 2017-06-27 RX ORDER — INSULIN GLARGINE 100 [IU]/ML
13 INJECTION, SOLUTION SUBCUTANEOUS AT BEDTIME
Qty: 0 | Refills: 0 | Status: DISCONTINUED | OUTPATIENT
Start: 2017-06-27 | End: 2017-06-28

## 2017-06-27 RX ORDER — BRIMONIDINE TARTRATE 2 MG/MG
1 SOLUTION/ DROPS OPHTHALMIC
Qty: 0 | Refills: 0 | Status: DISCONTINUED | OUTPATIENT
Start: 2017-06-27 | End: 2017-06-29

## 2017-06-27 RX ORDER — CLOPIDOGREL BISULFATE 75 MG/1
75 TABLET, FILM COATED ORAL DAILY
Qty: 0 | Refills: 0 | Status: DISCONTINUED | OUTPATIENT
Start: 2017-06-27 | End: 2017-06-29

## 2017-06-27 RX ORDER — ASPIRIN/CALCIUM CARB/MAGNESIUM 324 MG
81 TABLET ORAL DAILY
Qty: 0 | Refills: 0 | Status: DISCONTINUED | OUTPATIENT
Start: 2017-06-27 | End: 2017-06-29

## 2017-06-27 RX ORDER — CARVEDILOL PHOSPHATE 80 MG/1
6.25 CAPSULE, EXTENDED RELEASE ORAL EVERY 12 HOURS
Qty: 0 | Refills: 0 | Status: DISCONTINUED | OUTPATIENT
Start: 2017-06-27 | End: 2017-06-29

## 2017-06-27 RX ORDER — CLOPIDOGREL BISULFATE 75 MG/1
600 TABLET, FILM COATED ORAL ONCE
Qty: 0 | Refills: 0 | Status: DISCONTINUED | OUTPATIENT
Start: 2017-06-27 | End: 2017-06-29

## 2017-06-27 RX ORDER — PANTOPRAZOLE SODIUM 20 MG/1
40 TABLET, DELAYED RELEASE ORAL
Qty: 0 | Refills: 0 | Status: DISCONTINUED | OUTPATIENT
Start: 2017-06-27 | End: 2017-06-29

## 2017-06-27 RX ORDER — LOSARTAN POTASSIUM 100 MG/1
50 TABLET, FILM COATED ORAL DAILY
Qty: 0 | Refills: 0 | Status: DISCONTINUED | OUTPATIENT
Start: 2017-06-27 | End: 2017-06-29

## 2017-06-27 RX ORDER — DEXTROSE 50 % IN WATER 50 %
1 SYRINGE (ML) INTRAVENOUS ONCE
Qty: 0 | Refills: 0 | Status: DISCONTINUED | OUTPATIENT
Start: 2017-06-27 | End: 2017-06-28

## 2017-06-27 RX ADMIN — Medication 81 MILLIGRAM(S): at 12:10

## 2017-06-27 RX ADMIN — Medication 200 MILLIGRAM(S): at 06:02

## 2017-06-27 RX ADMIN — Medication 3: at 12:10

## 2017-06-27 RX ADMIN — Medication 25 GRAM(S): at 06:51

## 2017-06-27 RX ADMIN — PANTOPRAZOLE SODIUM 40 MILLIGRAM(S): 20 TABLET, DELAYED RELEASE ORAL at 08:23

## 2017-06-27 RX ADMIN — Medication 667 MILLIGRAM(S): at 21:35

## 2017-06-27 RX ADMIN — LOSARTAN POTASSIUM 50 MILLIGRAM(S): 100 TABLET, FILM COATED ORAL at 05:57

## 2017-06-27 RX ADMIN — ATORVASTATIN CALCIUM 40 MILLIGRAM(S): 80 TABLET, FILM COATED ORAL at 21:35

## 2017-06-27 RX ADMIN — BRIMONIDINE TARTRATE 1 DROP(S): 2 SOLUTION/ DROPS OPHTHALMIC at 21:35

## 2017-06-27 RX ADMIN — Medication 50 MILLIGRAM(S): at 21:35

## 2017-06-27 RX ADMIN — INSULIN GLARGINE 13 UNIT(S): 100 INJECTION, SOLUTION SUBCUTANEOUS at 22:40

## 2017-06-27 NOTE — DIETITIAN INITIAL EVALUATION ADULT. - OTHER INFO
pt seen for HD consult. pt admitted c dyspnea due to fluid overload from acute CHF; resolved s/p HD. wt decreased by 6# after dialysis. pt reports being on HD for 1 yr and dx c dm 23 yrs ago. he lives c his wife who cooks for him and controls dm c diet, meds (insulin, tradjenta), exercise (walks about half hr daily), and checking fsbs. HgA1c 11.8% though. full dentures; no difficulty chewing/swallowing.

## 2017-06-27 NOTE — DIETITIAN INITIAL EVALUATION ADULT. - ENERGY NEEDS
Height (cm): 172.72 (06-25)  Weight (kg): 65 (06-27)  BMI (kg/m2): 21.8 (06-27)  Ideal Body Weight:   % Ideal Body Weight:

## 2017-06-27 NOTE — H&P CARDIOLOGY - PMH
CKD (chronic kidney disease)    Diabetes mellitus type 2 in nonobese    Hypertension Anemia in chronic kidney disease    CKD (chronic kidney disease)    Coronary artery disease involving native coronary artery of native heart without angina pectoris    Diabetes mellitus type 2 in nonobese    ESRD (end stage renal disease) on dialysis  M-W-F At [facility]  Heart failure, unspecified heart failure chronicity, unspecified heart failure type    Hypertension    NSTEMI (non-ST elevated myocardial infarction)

## 2017-06-27 NOTE — H&P CARDIOLOGY - PSH
AV fistula infection  Left forearm  S/P angioplasty with stent  2017 mLAD 98% x 1  S/P cardiac catheterization AV fistula infection  Left forearm  S/P angioplasty with stent  2017 mLAD 98% x 1  S/P cardiac catheterization  Moderate to severe prox LAD disease. Multiple small  vessels with severe disease - too small for PCI (OM1, OM2, D1, D2).

## 2017-06-27 NOTE — PROGRESS NOTE ADULT - SUBJECTIVE AND OBJECTIVE BOX
Patient seen in follow up for ESRD; comfortable, no chest pain or SOB.    MEDICATIONS  (STANDING):  aspirin enteric coated 81 milliGRAM(s) Oral daily  losartan 50 milliGRAM(s) Oral daily  insulin glargine Injectable (LANTUS) 20 Unit(s) SubCutaneous at bedtime  simvastatin 20 milliGRAM(s) Oral at bedtime  labetalol 200 milliGRAM(s) Oral two times a day  pantoprazole    Tablet 40 milliGRAM(s) Oral before breakfast  hydrALAZINE 50 milliGRAM(s) Oral three times a day  insulin lispro (HumaLOG) corrective regimen sliding scale   SubCutaneous three times a day before meals  insulin lispro (HumaLOG) corrective regimen sliding scale   SubCutaneous at bedtime  dextrose 5%. 1000 milliLiter(s) (50 mL/Hr) IV Continuous <Continuous>  dextrose 50% Injectable 12.5 Gram(s) IV Push once  dextrose 50% Injectable 25 Gram(s) IV Push once  dextrose 50% Injectable 25 Gram(s) IV Push once    MEDICATIONS  (PRN):  dextrose Gel 1 Dose(s) Oral once PRN Blood Glucose LESS THAN 70 milliGRAM(s)/deciliter  glucagon  Injectable 1 milliGRAM(s) IntraMuscular once PRN Glucose LESS THAN 70 milligrams/deciliter    PHYSICAL EXAM:      T(C): 36.7 (06-27-17 @ 14:10), Max: 36.9 (06-27-17 @ 11:28)  HR: 60 (06-27-17 @ 14:10) (57 - 65)  BP: 113/57 (06-27-17 @ 14:10) (96/57 - 135/68)  RR: 16 (06-27-17 @ 14:10) (16 - 17)  SpO2: 99% (06-27-17 @ 14:10) (98% - 100%)  Wt(kg): --  Respiratory: clear anteriorly, decreased BS at bases  Cardiovascular: S1 S2  Gastrointestinal: soft NT ND +BS  Extremities:   tr edema                06-26    135  |  97  |  71<H>  ----------------------------<  135<H>  4.4   |  26  |  7.70<H>    Ca    7.8<L>      26 Jun 2017 10:58            Assessment and Plan:    Planned cardiac cath; will follow for HD tomorrow.

## 2017-06-27 NOTE — DIETITIAN INITIAL EVALUATION ADULT. - ADHERENCE
good/pt reports following therapeutic diet for dm, esrd/hd, and CHF. he says he doesn't eat too much rice or potatoes and avoids salt, cheese and chocolate.

## 2017-06-27 NOTE — H&P CARDIOLOGY - HISTORY OF PRESENT ILLNESS
63 y/o male with pmh of HTN, DM2 (uncontrolled AIC 11.8 c/w peripheral neuropathy), ESRD on HD M-W-F (last HD 6/26/17 Dr. Rodriguez, Nephrologist), CAD, diagnostic cath with Moderate to severe prox LAD disease. Multiple small vessels with severe disease - too small for PCI (OM1, OM2, D1, D2) 6/2016, NSTEMI with mLAD EDGAR x 1 98% (1/2017), RVP (1/2017) presented to [facility] with reports of dyspnea for the past 2 weeks at rest and reports going to routine HD appointments.  Pt denies cp, fevers or chills.  TTE 1/2017 revealed LVH, severe segmental left ventricular systolic dysfunction. Severe hypokinesis of the mid and apical septum, anterior wall, apex.  Pt treated for Acute systolic heart failure (no EF documented), negative Cardiac biomarkers pt dialyzed 6/26 and transferred for cardiac cath for further evaluation.  Pt denies cp sob or palpitations presently.  and         < from: Cardiac Cath Lab - Adult (06.23.16 @ 17:36) >  DIAGNOSTIC IMPRESSIONS: Moderate to severe prox LAD disease. Multiple small  vessels with severe disease - too small for PCI (OM1, OM2, D1, D2).  DIAGNOSTIC RECOMMENDATIONS: Titrate medical therapy.    < end of copied text >  < from: Cardiac Cath Lab - Adult (01.10.17 @ 07:38) >  CORONARY VESSELS: The coronary circulation is left dominant.  LM:   --  LM: Normal.  LAD:   --  Mid LAD: There was a 98 % stenosis.  CX:   --  Circumflex: Angiography showed minor luminal irregularities with  no flow limiting lesions.  RCA:   --  RCA: Angiography showed minor luminal irregularities with no  flow limiting lesions.  COMPLICATIONS: There were nocomplications.  DIAGNOSTIC RECOMMENDATIONS: ASA and Plavix for 1 year    < end of copied text >  < from: Transthoracic Echocardiogram (01.19.17 @ 10:38) >  Conclusions:  1. Mild concentric left ventricular hypertrophy.  2. Severe segmental left ventricular systolic dysfunction.  Severe hypokinesis of the mid and apical septum, anterior  wall, apex.  *** Compared with echocardiogram of 1/14/2017, no  significant changes noted.  Unable to rule out endocarditis, consider JEAN-PIERRE if clinically  indicated.    < end of copied text >  < from: Transesophageal Echocardiogram w/o TTE (01.23.17 @ 14:04) >  Conclusions:  1. Normal mitral valve. Mild mitral regurgitation.  2. Mildly calcified trileaflet aortic valve with normal  opening. Minimal aortic regurgitation.  3. Normal left atrium.  No left atrium or left atrial  appendage thrombus.  4. Severe segmental left ventricular systolic dysfunction.  5. Normal right ventricular size and function.  6. Normal tricuspid valve. Mild tricuspid regurgitation.  7. Estimated pulmonary artery systolic pressure equals 60  mm Hg, assuming right atrial pressure equals 8 mm Hg,  consistent with moderate pulmonary pressures.  8. Agitated saline injection and color flow doppler  demonstrate no evidence of a patent foramen ovale.  9. Left pleural effusion.  10. No vegetations seen. If clinical suspicion for  endocarditis remains high, consider repeat JEAN-PIERRE in  approximately 7-10 days.    < end of copied text > 61 y/o male with pmh of HTN, DM2 (uncontrolled AIC 11.8 c/w peripheral neuropathy), ESRD on HD M-W-F (last HD 6/26/17 Dr. Rodriguez, Nephrologist), CAD, diagnostic cath with Moderate to severe prox LAD disease. Multiple small vessels with severe disease - too small for PCI (OM1, OM2, D1, D2) 6/2016, NSTEMI with mLAD EDGAR x 1 98% (1/2017), RVP (1/2017), Anemia of CKD presented to [facility] with reports of dyspnea for the past 2 weeks at rest and reports going to routine HD appointments.  Pt denies cp, fevers or chills.  TTE 1/2017 revealed LVH, severe segmental left ventricular systolic dysfunction. Severe hypokinesis of the mid and apical septum, anterior wall, apex.  Pt treated for Acute systolic heart failure (no EF documented), negative Cardiac biomarkers pt dialyzed 6/26 and transferred for cardiac cath for further evaluation.  Pt denies cp sob or palpitations presently.  Pt states "I do not remember when I took my plavix last" No plavix given at [facility].  D/W Dr. Neville pt loaded with 600mg Plavix prior to cath with ASA 81 given today at OSH. Pt states has not followed up with a cardiologist after seeing Dr. Carroll, Cardiologist (did the PCI 1/17)  5- months ago.          PCP - Dr. Ryan   ESRD - ?Renal MD - [facility] (Dr. Rodriguez managed at [facility])    < from: Cardiac Cath Lab - Adult (06.23.16 @ 17:36) >  DIAGNOSTIC IMPRESSIONS: Moderate to severe prox LAD disease. Multiple small  vessels with severe disease - too small for PCI (OM1, OM2, D1, D2).  DIAGNOSTIC RECOMMENDATIONS: Titrate medical therapy.    < end of copied text >  < from: Cardiac Cath Lab - Adult (01.10.17 @ 07:38) >  CORONARY VESSELS: The coronary circulation is left dominant.  LM:   --  LM: Normal.  LAD:   --  Mid LAD: There was a 98 % stenosis.  CX:   --  Circumflex: Angiography showed minor luminal irregularities with  no flow limiting lesions.  RCA:   --  RCA: Angiography showed minor luminal irregularities with no  flow limiting lesions.  COMPLICATIONS: There were nocomplications.  DIAGNOSTIC RECOMMENDATIONS: ASA and Plavix for 1 year    < end of copied text >  < from: Transthoracic Echocardiogram (01.19.17 @ 10:38) >  Conclusions:  1. Mild concentric left ventricular hypertrophy.  2. Severe segmental left ventricular systolic dysfunction.  Severe hypokinesis of the mid and apical septum, anterior  wall, apex.  *** Compared with echocardiogram of 1/14/2017, no  significant changes noted.  Unable to rule out endocarditis, consider JEAN-PIERRE if clinically  indicated.    < end of copied text >  < from: Transesophageal Echocardiogram w/o TTE (01.23.17 @ 14:04) >  Conclusions:  1. Normal mitral valve. Mild mitral regurgitation.  2. Mildly calcified trileaflet aortic valve with normal  opening. Minimal aortic regurgitation.  3. Normal left atrium.  No left atrium or left atrial  appendage thrombus.  4. Severe segmental left ventricular systolic dysfunction.  5. Normal right ventricular size and function.  6. Normal tricuspid valve. Mild tricuspid regurgitation.  7. Estimated pulmonary artery systolic pressure equals 60  mm Hg, assuming right atrial pressure equals 8 mm Hg,  consistent with moderate pulmonary pressures.  8. Agitated saline injection and color flow doppler  demonstrate no evidence of a patent foramen ovale.  9. Left pleural effusion.  10. No vegetations seen. If clinical suspicion for  endocarditis remains high, consider repeat JEAN-PIERRE in  approximately 7-10 days.    < end of copied text >

## 2017-06-27 NOTE — DIETITIAN INITIAL EVALUATION ADULT. - PERTINENT LABORATORY DATA
06-26 Na135 mmol/L Glu 135 mg/dL<H> K+ 4.4 mmol/L Cr  7.70 mg/dL<H> BUN 71 mg/dL<H> Phos n/a   Alb n/a   PAB n/a   06-26 [id number] 11.8

## 2017-06-28 ENCOUNTER — TRANSCRIPTION ENCOUNTER (OUTPATIENT)
Age: 62
End: 2017-06-28

## 2017-06-28 DIAGNOSIS — I25.10 ATHEROSCLEROTIC HEART DISEASE OF NATIVE CORONARY ARTERY WITHOUT ANGINA PECTORIS: ICD-10-CM

## 2017-06-28 DIAGNOSIS — N18.6 END STAGE RENAL DISEASE: ICD-10-CM

## 2017-06-28 DIAGNOSIS — E11.9 TYPE 2 DIABETES MELLITUS WITHOUT COMPLICATIONS: ICD-10-CM

## 2017-06-28 DIAGNOSIS — I10 ESSENTIAL (PRIMARY) HYPERTENSION: ICD-10-CM

## 2017-06-28 DIAGNOSIS — I21.4 NON-ST ELEVATION (NSTEMI) MYOCARDIAL INFARCTION: ICD-10-CM

## 2017-06-28 LAB
ANION GAP SERPL CALC-SCNC: 17 MMOL/L — SIGNIFICANT CHANGE UP (ref 5–17)
BUN SERPL-MCNC: 61 MG/DL — HIGH (ref 7–23)
CALCIUM SERPL-MCNC: 7.9 MG/DL — LOW (ref 8.4–10.5)
CHLORIDE SERPL-SCNC: 95 MMOL/L — LOW (ref 96–108)
CO2 SERPL-SCNC: 23 MMOL/L — SIGNIFICANT CHANGE UP (ref 22–31)
CREAT SERPL-MCNC: 7.13 MG/DL — HIGH (ref 0.5–1.3)
GLUCOSE SERPL-MCNC: 88 MG/DL — SIGNIFICANT CHANGE UP (ref 70–99)
HBA1C BLD-MCNC: 11.5 % — HIGH (ref 4–5.6)
HCT VFR BLD CALC: 32.4 % — LOW (ref 39–50)
HGB BLD-MCNC: 11.3 G/DL — LOW (ref 13–17)
MCHC RBC-ENTMCNC: 34.3 PG — HIGH (ref 27–34)
MCHC RBC-ENTMCNC: 34.7 GM/DL — SIGNIFICANT CHANGE UP (ref 32–36)
MCV RBC AUTO: 98.8 FL — SIGNIFICANT CHANGE UP (ref 80–100)
PLATELET # BLD AUTO: 161 K/UL — SIGNIFICANT CHANGE UP (ref 150–400)
POTASSIUM SERPL-MCNC: 4.6 MMOL/L — SIGNIFICANT CHANGE UP (ref 3.5–5.3)
POTASSIUM SERPL-SCNC: 4.6 MMOL/L — SIGNIFICANT CHANGE UP (ref 3.5–5.3)
RBC # BLD: 3.28 M/UL — LOW (ref 4.2–5.8)
RBC # FLD: 14.1 % — SIGNIFICANT CHANGE UP (ref 10.3–14.5)
SODIUM SERPL-SCNC: 135 MMOL/L — SIGNIFICANT CHANGE UP (ref 135–145)
WBC # BLD: 6.7 K/UL — SIGNIFICANT CHANGE UP (ref 3.8–10.5)
WBC # FLD AUTO: 6.7 K/UL — SIGNIFICANT CHANGE UP (ref 3.8–10.5)

## 2017-06-28 PROCEDURE — 93010 ELECTROCARDIOGRAM REPORT: CPT

## 2017-06-28 PROCEDURE — 92928 PRQ TCAT PLMT NTRAC ST 1 LES: CPT | Mod: LD

## 2017-06-28 PROCEDURE — 93010 ELECTROCARDIOGRAM REPORT: CPT | Mod: 59,77

## 2017-06-28 RX ORDER — INSULIN GLARGINE 100 [IU]/ML
8 INJECTION, SOLUTION SUBCUTANEOUS AT BEDTIME
Qty: 0 | Refills: 0 | Status: DISCONTINUED | OUTPATIENT
Start: 2017-06-28 | End: 2017-06-29

## 2017-06-28 RX ORDER — INSULIN LISPRO 100/ML
VIAL (ML) SUBCUTANEOUS AT BEDTIME
Qty: 0 | Refills: 0 | Status: DISCONTINUED | OUTPATIENT
Start: 2017-06-28 | End: 2017-06-29

## 2017-06-28 RX ORDER — SODIUM CHLORIDE 9 MG/ML
1000 INJECTION, SOLUTION INTRAVENOUS
Qty: 0 | Refills: 0 | Status: DISCONTINUED | OUTPATIENT
Start: 2017-06-28 | End: 2017-06-29

## 2017-06-28 RX ORDER — INSULIN LISPRO 100/ML
2 VIAL (ML) SUBCUTANEOUS
Qty: 0 | Refills: 0 | Status: DISCONTINUED | OUTPATIENT
Start: 2017-06-28 | End: 2017-06-29

## 2017-06-28 RX ORDER — FENTANYL CITRATE 50 UG/ML
25 INJECTION INTRAVENOUS ONCE
Qty: 0 | Refills: 0 | Status: DISCONTINUED | OUTPATIENT
Start: 2017-06-28 | End: 2017-06-28

## 2017-06-28 RX ORDER — DEXTROSE 50 % IN WATER 50 %
1 SYRINGE (ML) INTRAVENOUS ONCE
Qty: 0 | Refills: 0 | Status: DISCONTINUED | OUTPATIENT
Start: 2017-06-28 | End: 2017-06-29

## 2017-06-28 RX ORDER — DEXTROSE 50 % IN WATER 50 %
25 SYRINGE (ML) INTRAVENOUS ONCE
Qty: 0 | Refills: 0 | Status: DISCONTINUED | OUTPATIENT
Start: 2017-06-28 | End: 2017-06-29

## 2017-06-28 RX ORDER — DEXTROSE 50 % IN WATER 50 %
12.5 SYRINGE (ML) INTRAVENOUS ONCE
Qty: 0 | Refills: 0 | Status: DISCONTINUED | OUTPATIENT
Start: 2017-06-28 | End: 2017-06-29

## 2017-06-28 RX ORDER — DEXTROSE 50 % IN WATER 50 %
1 SYRINGE (ML) INTRAVENOUS ONCE
Qty: 0 | Refills: 0 | Status: COMPLETED | OUTPATIENT
Start: 2017-06-28 | End: 2017-06-28

## 2017-06-28 RX ORDER — INSULIN LISPRO 100/ML
VIAL (ML) SUBCUTANEOUS
Qty: 0 | Refills: 0 | Status: DISCONTINUED | OUTPATIENT
Start: 2017-06-28 | End: 2017-06-29

## 2017-06-28 RX ORDER — GLUCAGON INJECTION, SOLUTION 0.5 MG/.1ML
1 INJECTION, SOLUTION SUBCUTANEOUS ONCE
Qty: 0 | Refills: 0 | Status: DISCONTINUED | OUTPATIENT
Start: 2017-06-28 | End: 2017-06-29

## 2017-06-28 RX ADMIN — INSULIN GLARGINE 8 UNIT(S): 100 INJECTION, SOLUTION SUBCUTANEOUS at 22:52

## 2017-06-28 RX ADMIN — Medication 667 MILLIGRAM(S): at 22:26

## 2017-06-28 RX ADMIN — Medication 50 MILLIGRAM(S): at 05:37

## 2017-06-28 RX ADMIN — CLOPIDOGREL BISULFATE 75 MILLIGRAM(S): 75 TABLET, FILM COATED ORAL at 07:30

## 2017-06-28 RX ADMIN — ATORVASTATIN CALCIUM 40 MILLIGRAM(S): 80 TABLET, FILM COATED ORAL at 22:25

## 2017-06-28 RX ADMIN — Medication 50 MILLIGRAM(S): at 22:25

## 2017-06-28 RX ADMIN — Medication 2000 UNIT(S): at 11:46

## 2017-06-28 RX ADMIN — Medication 3: at 11:46

## 2017-06-28 RX ADMIN — Medication 81 MILLIGRAM(S): at 07:30

## 2017-06-28 RX ADMIN — Medication 667 MILLIGRAM(S): at 11:46

## 2017-06-28 RX ADMIN — CARVEDILOL PHOSPHATE 6.25 MILLIGRAM(S): 80 CAPSULE, EXTENDED RELEASE ORAL at 05:36

## 2017-06-28 RX ADMIN — FENTANYL CITRATE 25 MICROGRAM(S): 50 INJECTION INTRAVENOUS at 12:59

## 2017-06-28 RX ADMIN — Medication 1 TABLET(S): at 11:46

## 2017-06-28 RX ADMIN — PANTOPRAZOLE SODIUM 40 MILLIGRAM(S): 20 TABLET, DELAYED RELEASE ORAL at 05:36

## 2017-06-28 RX ADMIN — Medication 4 UNIT(S): at 11:47

## 2017-06-28 RX ADMIN — FENTANYL CITRATE 25 MICROGRAM(S): 50 INJECTION INTRAVENOUS at 12:25

## 2017-06-28 RX ADMIN — Medication 1 DOSE(S): at 07:50

## 2017-06-28 NOTE — DIETITIAN INITIAL EVALUATION ADULT. - OTHER INFO
Pt reports usual dry weight of 150 lbs, noted current admit weight 149.9 lbs-6/27. No food allergies, takes multivitamin, vitamin D, calcium. Pt with no N+V, last BM yesterday. Pt has no difficulty chewing/swallowing-pt with full upper and lower dentures which fit well. In house pt has not eaten yet today due to being off the floor.

## 2017-06-28 NOTE — CHART NOTE - NSCHARTNOTEFT_GEN_A_CORE
Removal of Femoral Sheath    Pulses in the (right/left) lower extremity are palpable. The patient was placed in the supine position. The insertion site was identified and the sutures were removed per protocol.  The _6___ Welsh femoral sheath was then removed. Direct pressure was applied for  _20_____ minutes.     Monitoring of the (right/left) groin and both lower extremities including neuro-vascular checks and vital signs every 15 minutes x 4, then every 30 minutes x 2, then every 1 hour was ordered.    Complications: None

## 2017-06-28 NOTE — PROGRESS NOTE ADULT - SUBJECTIVE AND OBJECTIVE BOX
Patient is a 62y old  Male who presents with a chief complaint of s/p cardiac cath (27 Jun 2017 20:21)      SUBJECTIVE / OVERNIGHT EVENTS:  Review of Systems  chest pain no  palpitations no  sob no  nausea no  headache no    MEDICATIONS  (STANDING):  epoetin mayelin Injectable 4000 Unit(s) IV Push <User Schedule>  clopidogrel Tablet 600 milliGRAM(s) Oral once  aspirin  chewable 81 milliGRAM(s) Oral daily  losartan 50 milliGRAM(s) Oral daily  atorvastatin 40 milliGRAM(s) Oral at bedtime  clopidogrel Tablet 75 milliGRAM(s) Oral daily  carvedilol 6.25 milliGRAM(s) Oral every 12 hours  brimonidine 0.2% Ophthalmic Solution 1 Drop(s) Both EYES two times a day  calcium acetate 667 milliGRAM(s) Oral three times a day with meals  pantoprazole    Tablet 40 milliGRAM(s) Oral before breakfast  hydrALAZINE 50 milliGRAM(s) Oral three times a day  multivitamin 1 Tablet(s) Oral daily  cholecalciferol 2000 Unit(s) Oral daily  insulin glargine Injectable (LANTUS) 13 Unit(s) SubCutaneous at bedtime  insulin lispro Injectable (HumaLOG) 4 Unit(s) SubCutaneous three times a day before meals  insulin lispro (HumaLOG) corrective regimen sliding scale   SubCutaneous three times a day before meals  insulin lispro (HumaLOG) corrective regimen sliding scale   SubCutaneous at bedtime  dextrose 5%. 1000 milliLiter(s) (50 mL/Hr) IV Continuous <Continuous>  dextrose 50% Injectable 12.5 Gram(s) IV Push once  dextrose 50% Injectable 25 Gram(s) IV Push once  dextrose 50% Injectable 25 Gram(s) IV Push once    MEDICATIONS  (PRN):  dextrose Gel 1 Dose(s) Oral once PRN Blood Glucose LESS THAN 70 milliGRAM(s)/deciliter  glucagon  Injectable 1 milliGRAM(s) IntraMuscular once PRN Glucose LESS THAN 70 milligrams/deciliter      Vital Signs Last 24 Hrs  T(C): 36.4 (06-28-17 @ 10:30), Max: 36.8 (06-27-17 @ 20:00)  HR: 72 (06-28-17 @ 15:35) (64 - 83)  BP: 111/49 (06-28-17 @ 15:35) (105/49 - 141/69)  RR: 18 (06-28-17 @ 15:35) (16 - 18)  SpO2: 100% (06-28-17 @ 15:35) (95% - 100%)  CAPILLARY BLOOD GLUCOSE  258 (28 Jun 2017 11:03)  118 (28 Jun 2017 09:16)  92 (28 Jun 2017 08:49)  72 (28 Jun 2017 08:31)  47 (28 Jun 2017 08:01)  42 (28 Jun 2017 07:43)  48 (28 Jun 2017 07:42)  137 (27 Jun 2017 20:46)        I&O's Summary    27 Jun 2017 07:01  -  28 Jun 2017 07:00  --------------------------------------------------------  IN: 520 mL / OUT: 700 mL / NET: -180 mL    28 Jun 2017 07:01  -  28 Jun 2017 15:55  --------------------------------------------------------  IN: 240 mL / OUT: 0 mL / NET: 240 mL        PHYSICAL EXAM:  GENERAL: NAD, well-developed  HEAD:  Atraumatic, Normocephalic  EYES: EOMI, PERRLA, conjunctiva and sclera clear  NECK: Supple, No JVD  CHEST/LUNG: Clear to auscultation bilaterally; No wheeze  HEART: Regular rate and rhythm; No murmurs, rubs, or gallops  ABDOMEN: Soft, Nontender, Nondistended; Bowel sounds present  EXTREMITIES:  2+ Peripheral Pulses, No clubbing, cyanosis, or edema  PSYCH: AAOx3  NEUROLOGY: non-focal  SKIN: No rashes or lesions    LABS:                        11.3   6.7   )-----------( 161      ( 28 Jun 2017 04:29 )             32.4     06-28    135  |  95<L>  |  61<H>  ----------------------------<  88  4.6   |  23  |  7.13<H>    Ca    7.9<L>      28 Jun 2017 04:29                RADIOLOGY & ADDITIONAL TESTS:    Imaging Personally Reviewed:    Consultant(s) Notes Reviewed:      Care Discussed with Consultants/Other Providers:

## 2017-06-28 NOTE — DISCHARGE NOTE ADULT - MEDICATION SUMMARY - MEDICATIONS TO TAKE
I will START or STAY ON the medications listed below when I get home from the hospital:    aspirin 81 mg oral tablet, chewable  -- 1 tab(s) by mouth once a day  -- Indication: For CAD     losartan 50 mg oral tablet  -- 1 tab(s) by mouth once a day - home/hospital  -- Indication: For High Blood Pressure    Lantus 100 units/mL subcutaneous solution  -- 10 unit(s) subcutaneous once a day (at bedtime)  -- Indication: For Diabets    HumaLOG 100 units/mL subcutaneous solution  -- 3 unit(s) subcutaneous 3 times a day (before meals)  -- Indication: For Diabetes     atorvastatin 40 mg oral tablet  -- 1 tab(s) by mouth once a day (at bedtime) - home/hospital  -- Indication: For High Cholesterol    clopidogrel 75 mg oral tablet  -- 1 tab(s) by mouth once a day - Home  -- Indication: For CAD    carvedilol 6.25 mg oral tablet  -- 1 tab(s) by mouth every 12 hours - home  -- Indication: For High Blood Pressure    Alphagan P 0.1% ophthalmic solution  -- 1 drop(s) to each affected eye 2 times a day - home  -- Indication: For Glaucoma    calcium acetate 667 mg oral capsule  -- 1 cap(s) by mouth 3 times a day (with meals) - home  -- Indication: For ESRD (end stage renal disease) on dialysis    pantoprazole 40 mg oral delayed release tablet  -- 1 tab(s) by mouth once a day (before a meal) - hospital  -- Indication: For GERD    multivitamin  -- 1 tab(s) by mouth once a day - home  -- Indication: For Supplement    Vitamin D3 2000 intl units oral capsule  -- 1 cap(s) by mouth once a day - home  -- Indication: For Supplement

## 2017-06-28 NOTE — DIETITIAN INITIAL EVALUATION ADULT. - NS AS NUTRI INTERV MEALS SNACK
1. Consider changing diet to DASH, renal, consistent carbohydrate diet with evening snack, 2. Review diet education as able, 3. Monitor PO intake, diet tolerance, weight trends, labs, and skin integrity, 4. RD to remain available as needed

## 2017-06-28 NOTE — DISCHARGE NOTE ADULT - SECONDARY DIAGNOSIS.
ESRD (end stage renal disease) on dialysis Essential hypertension Uncontrolled type 1 diabetes mellitus with chronic kidney disease on chronic dialysis

## 2017-06-28 NOTE — DISCHARGE NOTE ADULT - HOSPITAL COURSE
61 y/o male with pmh of HTN, DM2 (uncontrolled AIC 11.8 c/w peripheral neuropathy), ESRD on HD M-W-F (last HD 6/26/17 Dr. Rodriguez, Nephrologist), CAD, diagnostic cath with Moderate to severe prox LAD disease. Multiple small vessels with severe disease - too small for PCI (OM1, OM2, D1, D2) 6/2016, NSTEMI with mLAD EDGAR x 1 98% (1/2017), RVP (1/2017), Anemia of CKD presented to [facility] with reports of dyspnea for the past 2 weeks at rest and reports going to routine HD appointments.  Pt denies cp, fevers or chills.  TTE 1/2017 revealed LVH, severe segmental left ventricular systolic dysfunction. Severe hypokinesis of the mid and apical septum, anterior wall, apex.  Pt treated for Acute systolic heart failure (no EF documented), negative Cardiac biomarkers pt dialyzed 6/26 and transferred for cardiac cath for further evaluation.  Pt denies cp sob or palpitations presently.  Pt states "I do not remember when I took my plavix last" No plavix given at [facility].  D/W Dr. Neville pt loaded with 600mg Plavix prior to cath with ASA 81 given today at OSH. Pt states has not followed up with a cardiologist after seeing Dr. Carroll, Cardiologist (did the PCI 1/17)  5- months ago.      PCP - Dr. Ryan   ESRD - ?Renal MD - [facility] (Dr. Rodriguez managed at [facility])  Pt s/p PCI: EDGAR x1 to pLAD via R groin. Pt tolerated procedure well and overnight remained uneventful. No c/o chest pain or SOB. Pt is hemodynamically stable, EKG and all lab results reviewed. Insertion/incision site benign, no bleeding or hematoma, and cath site dressing changed. H.D done post cath. Discharge teaching provided to Pt/caretaker and verbalized understanding the instruction. Pt is stable for discharge home as per attending. 63 y/o male with pmh of HTN, DM2 (uncontrolled AIC 11.8 c/w peripheral neuropathy), ESRD on HD M-W-F (last HD 6/26/17 Dr. Rodriguez, Nephrologist), CAD, diagnostic cath with Moderate to severe prox LAD disease. Multiple small vessels with severe disease - too small for PCI (OM1, OM2, D1, D2) 6/2016, NSTEMI with mLAD EDGAR x 1 98% (1/2017), RVP (1/2017), Anemia of CKD presented to [facility] with reports of dyspnea for the past 2 weeks at rest and reports going to routine HD appointments.  Pt denies cp, fevers or chills.  TTE 1/2017 revealed LVH, severe segmental left ventricular systolic dysfunction. Severe hypokinesis of the mid and apical septum, anterior wall, apex.  Pt treated for Acute systolic heart failure (no EF documented), negative Cardiac biomarkers pt dialyzed 6/26 and transferred for cardiac cath for further evaluation.  Pt denies cp sob or palpitations presently.  Pt states "I do not remember when I took my plavix last" No plavix given at [facility].  D/W Dr. Neville pt loaded with 600mg Plavix prior to cath with ASA 81 given today at OSH. Pt states has not followed up with a cardiologist after seeing Dr. Carroll, Cardiologist (did the PCI 1/17).  Patient is s/p diagnostic LHC on 6/27 and PCI to pLAD 99% with EDGAR x 1 on 6/28 (Dr. Pantoja) via RFA access.  He tolerated both procedures well, post PCI EKG and AM EKG without acute changes. RFA site benign without presence of bleeding/hematoma, patient without complaints.  Endocrinology consulted 2/2 Hemoglobin A1C 11.5 and labile accuchecks, inpatient and home insulin regimen adjusted appropriately.  Patient had dialysis 6/28 s/p PCI and is scheduled to have as outpatient on 6/30.  His hospital course was otherwise uneventful.  Case discussed with Dr. Banuelos, patient now medically stable for discharge home today. 63 y/o male with pmh of HTN, DM2 (uncontrolled AIC 11.8 c/w peripheral neuropathy), ESRD on HD M-W-F (last HD 6/26/17 Dr. Rodriguez, Nephrologist), CAD, diagnostic cath with Moderate to severe prox LAD disease. Multiple small vessels with severe disease - too small for PCI (OM1, OM2, D1, D2) 6/2016, NSTEMI with mLAD EDGAR x 1 98% (1/2017), RVP (1/2017), Anemia of CKD presented to [facility] with reports of dyspnea for the past 2 weeks at rest and reports going to routine HD appointments.  Pt denies cp, fevers or chills.  TTE 1/2017 revealed LVH, severe segmental left ventricular systolic dysfunction. Severe hypokinesis of the mid and apical septum, anterior wall, apex.  Pt treated for Acute systolic heart failure (no EF documented), negative Cardiac biomarkers pt dialyzed 6/26 and transferred for cardiac cath for further evaluation.  Pt denies cp sob or palpitations presently.  Pt states "I do not remember when I took my plavix last" No plavix given at [facility].  D/W Dr. Neville pt loaded with 600mg Plavix prior to cath with ASA 81 given today at OSH. Pt states has not followed up with a cardiologist after seeing Dr. Carroll, Cardiologist (did the PCI 1/17).  Patient is s/p diagnostic LHC on 6/27 and PCI to pLAD 99% with EDGAR x 1 on 6/28 (Dr. Pantoja) via RFA access.  He tolerated both procedures well, post PCI EKG and AM EKG without acute changes. RFA site benign without presence of bleeding/hematoma, patient without complaints.  Endocrinology consulted 2/2 Hemoglobin A1C 11.5 and labile accuchecks, inpatient and home insulin regimen adjusted appropriately.  Patient had dialysis 6/28 s/p PCI and is scheduled to have as outpatient on 6/30.  His hospital course was otherwise uneventful.  Case discussed with Dr. Banuelos, patient now medically stable for discharge home today.  Medically stable

## 2017-06-28 NOTE — DISCHARGE NOTE ADULT - CARE PROVIDER_API CALL
Myke Carroll), Cardiovascular Disease; Interventional Cardiology  [address] [place] [id number]  Phone: [phone]  Fax: [phone]    Steve Carrillo), Internal Medicine  [address] [place] [address]  Phone: [phone]  Fax: [phone] Markell Neville (MD), Internal Medicine  [address] [facility] [address]  [place] [address]  Phone: [phone]  Fax: [phone]    Endocrinology Clinic,   Phone: [phone]  Fax: (   )    -

## 2017-06-28 NOTE — DIETITIAN INITIAL EVALUATION ADULT. - ENERGY NEEDS
Pt seen for consult for dialysis. Pt with PMH including HTN, type 2 DM on insulin, ESRD on dialysis, CAD S/P diagnostic cath    Ht:6'8" , Wt:149.9 lbs, BMI:22.8 kg/m2, IBW:154 lbs +/- 10%, %IBW: 97%  No edema, Skin intact

## 2017-06-28 NOTE — PROGRESS NOTE ADULT - ASSESSMENT
62 m s/p cath with stent LAD  post cath care  ESRD for HD./nephrology follow.  Diabetes contriol  cardiology follow.

## 2017-06-28 NOTE — DIETITIAN INITIAL EVALUATION ADULT. - ADHERENCE
Pt is aware he needs to be mindful of potassium, phosphorus, and sodium intake, was able to verablize foods high in phosphorus, was unable to recall foods high in potassium, states he does not consume juices or sodas and will avoid concentrated sweets. Pt follows up with dietitian at dialysis and stated his electrolytes have been within normal limits. Pt checks blood glucose 4 x daily with values usually <200-is on insulin at home. Pt is also mindful of fluid intake

## 2017-06-28 NOTE — DISCHARGE NOTE ADULT - CARE PROVIDERS DIRECT ADDRESSES
,[email or web address],DirectAddress_Unknown ,DirectAddress_Unknown,DirectAddress_Unknown

## 2017-06-28 NOTE — DISCHARGE NOTE ADULT - PATIENT PORTAL LINK FT
“You can access the FollowHealth Patient Portal, offered by [facility], by registering with the following website: [email or web address]”

## 2017-06-28 NOTE — PROGRESS NOTE ADULT - SUBJECTIVE AND OBJECTIVE BOX
Patient is a 62y old  Male who presents with a chief complaint of s/p cardiac cath (2017 20:21). Pt is s/p cardiac cath for stage d/t severe LAD disease          Allergies    No Known Allergies    Intolerances        Medications:  epoetin mayelin Injectable 4000 Unit(s) IV Push <User Schedule>  clopidogrel Tablet 600 milliGRAM(s) Oral once  aspirin  chewable 81 milliGRAM(s) Oral daily  losartan 50 milliGRAM(s) Oral daily  atorvastatin 40 milliGRAM(s) Oral at bedtime  clopidogrel Tablet 75 milliGRAM(s) Oral daily  carvedilol 6.25 milliGRAM(s) Oral every 12 hours  brimonidine 0.2% Ophthalmic Solution 1 Drop(s) Both EYES two times a day  calcium acetate 667 milliGRAM(s) Oral three times a day with meals  pantoprazole    Tablet 40 milliGRAM(s) Oral before breakfast  hydrALAZINE 50 milliGRAM(s) Oral three times a day  multivitamin 1 Tablet(s) Oral daily  cholecalciferol 2000 Unit(s) Oral daily  insulin glargine Injectable (LANTUS) 13 Unit(s) SubCutaneous at bedtime  insulin lispro Injectable (HumaLOG) 4 Unit(s) SubCutaneous three times a day before meals  insulin lispro (HumaLOG) corrective regimen sliding scale   SubCutaneous three times a day before meals  insulin lispro (HumaLOG) corrective regimen sliding scale   SubCutaneous at bedtime  dextrose 5%. 1000 milliLiter(s) IV Continuous <Continuous>  dextrose Gel 1 Dose(s) Oral once PRN  dextrose 50% Injectable 12.5 Gram(s) IV Push once  dextrose 50% Injectable 25 Gram(s) IV Push once  dextrose 50% Injectable 25 Gram(s) IV Push once  glucagon  Injectable 1 milliGRAM(s) IntraMuscular once PRN      Vitals:  T(C): 36.8 (17 @ 20:00), Max: 36.8 (17 @ 20:00)  HR: 67 (17 @ 23:45) (65 - 76)  BP: 106/44 (17 @ 23:45) (106/44 - 141/69)  BP(mean): 88 (17 @ 16:45) (88 - 88)  RR: 17 (17 @ 23:45) (16 - 17)  SpO2: 99% (17 @ 23:45) (95% - 100%)  Wt(kg): --  Daily Height in cm: 172.72 (2017 16:45)    Daily Weight in k (2017 16:45)  I&O's Summary    2017 07:01  -  2017 00:55  --------------------------------------------------------  IN: 240 mL / OUT: 0 mL / NET: 240 mL          Physical Exam:  Appearance: Normal  Eyes: PERRL, EOMI  HENT: Normal oral muscosa, NC/AT  Cardiovascular: S1S2, RRR, No M/R/G, no JVD, No Lower extremity edema  Procedural Access Site: No hematoma, Non-tender to palpation, 2+ pulse, No bruit, No Ecchymosis  Respiratory: Clear to auscultation bilaterally  Gastrointestinal: Soft, Non tender, Normal Bowel Sounds  Musculoskeletal: No clubbing, No joint deformity   Neurologic: Non-focal  Lymphatic: No lymphadenopathy  Psychiatry: AAOx3, Mood & affect appropriate  Skin: No rashes, No ecchymoses, No cyanosis                  Lipid panel   Hgb A1c         ECG:    Echo:    Stress Testing:     Cath:    Imaging:    Interpretation of Telemetry:

## 2017-06-28 NOTE — DISCHARGE NOTE ADULT - PROVIDER TOKENS
TOKEN:'[email or web address]:'[email or web address]' TOKEN:'[id number]',FREE:[LAST:[Endocrinology Clinic],PHONE:[phone]],FAX:[(   )    -]]

## 2017-06-28 NOTE — DISCHARGE NOTE ADULT - ADDITIONAL INSTRUCTIONS
Follow-up with Dr. Neville in 1-2 weeks  Follow-up in the Endocrinology Clinic @ [facility] in 2 weeks

## 2017-06-28 NOTE — CONSULT NOTE ADULT - SUBJECTIVE AND OBJECTIVE BOX
Patient is a 62y old  Male who presents with a chief complaint of s/p cardiac cath (27 Jun 2017 20:21)    HPI:  63 y/o male with pmh of HTN, DM2 (uncontrolled AIC 11.8 c/w peripheral neuropathy), ESRD on HD M-W-F (last HD 6/26/17 Dr. Rodriguez, Nephrologist), CAD, diagnostic cath with Moderate to severe prox LAD disease. Multiple small vessels with severe disease - too small for PCI (OM1, OM2, D1, D2) 6/2016, NSTEMI with mLAD EDGAR x 1 98% (1/2017), RVP (1/2017), Anemia of CKD presented to [facility] with reports of dyspnea for the past 2 weeks at rest and reports going to routine HD appointments.  Pt denies cp, fevers or chills.  TTE 1/2017 revealed LVH, severe segmental left ventricular systolic dysfunction. Severe hypokinesis of the mid and apical septum, anterior wall, apex.  Pt treated for Acute systolic heart failure (no EF documented), negative Cardiac biomarkers pt dialyzed 6/26 and transferred for cardiac cath for further evaluation.  Pt denies cp sob or palpitations presently.  Pt states "I do not remember when I took my plavix last" No plavix given at [facility].  D/W Dr. Neville pt loaded with 600mg Plavix prior to cath with ASA 81 given today at OSH. Pt states has not followed up with a cardiologist after seeing Dr. Carroll, Cardiologist (did the PCI 1/17)  5- months ago.          PCP - Dr. Ryan   ESRD - ?Renal MD - [facility] (Dr. Rodriguez managed at [facility])    < from: Cardiac Cath Lab - Adult (06.23.16 @ 17:36) >  DIAGNOSTIC IMPRESSIONS: Moderate to severe prox LAD disease. Multiple small  vessels with severe disease - too small for PCI (OM1, OM2, D1, D2).  DIAGNOSTIC RECOMMENDATIONS: Titrate medical therapy.    < end of copied text >  < from: Cardiac Cath Lab - Adult (01.10.17 @ 07:38) >  CORONARY VESSELS: The coronary circulation is left dominant.  LM:   --  LM: Normal.  LAD:   --  Mid LAD: There was a 98 % stenosis.  CX:   --  Circumflex: Angiography showed minor luminal irregularities with  no flow limiting lesions.  RCA:   --  RCA: Angiography showed minor luminal irregularities with no  flow limiting lesions.  COMPLICATIONS: There were nocomplications.  DIAGNOSTIC RECOMMENDATIONS: ASA and Plavix for 1 year    < end of copied text >  < from: Transthoracic Echocardiogram (01.19.17 @ 10:38) >  Conclusions:  1. Mild concentric left ventricular hypertrophy.  2. Severe segmental left ventricular systolic dysfunction.  Severe hypokinesis of the mid and apical septum, anterior  wall, apex.  *** Compared with echocardiogram of 1/14/2017, no  significant changes noted.  Unable to rule out endocarditis, consider JEAN-PIERRE if clinically  indicated.    < end of copied text >  < from: Transesophageal Echocardiogram w/o TTE (01.23.17 @ 14:04) >  Conclusions:  1. Normal mitral valve. Mild mitral regurgitation.  2. Mildly calcified trileaflet aortic valve with normal  opening. Minimal aortic regurgitation.  3. Normal left atrium.  No left atrium or left atrial  appendage thrombus.  4. Severe segmental left ventricular systolic dysfunction.  5. Normal right ventricular size and function.  6. Normal tricuspid valve. Mild tricuspid regurgitation.  7. Estimated pulmonary artery systolic pressure equals 60  mm Hg, assuming right atrial pressure equals 8 mm Hg,  consistent with moderate pulmonary pressures.  8. Agitated saline injection and color flow doppler  demonstrate no evidence of a patent foramen ovale.  9. Left pleural effusion.  10. No vegetations seen. If clinical suspicion for  endocarditis remains high, consider repeat JEAN-PIERRE in  approximately 7-10 days.    < end of copied text > (27 Jun 2017 16:45)    PAST MEDICAL & SURGICAL HISTORY:  Anemia in chronic kidney disease  NSTEMI (non-ST elevated myocardial infarction)  Heart failure, unspecified heart failure chronicity, unspecified heart failure type  Coronary artery disease involving native coronary artery of native heart without angina pectoris  ESRD (end stage renal disease) on dialysis: M-W-F At [facility]  CKD (chronic kidney disease)  Diabetes mellitus type 2 in nonobese  Hypertension  S/P cardiac catheterization: Moderate to severe prox LAD disease. Multiple small  vessels with severe disease - too small for PCI (OM1, OM2, D1, D2).  AV fistula infection: Left forearm  S/P angioplasty with stent: 2017 mLAD 98% x 1    FAMILY HISTORY:  No pertinent family history in first degree relatives    No Known Allergies    MEDICATIONS  (STANDING):  epoetin mayelin Injectable 4000 Unit(s) IV Push <User Schedule>  clopidogrel Tablet 600 milliGRAM(s) Oral once  aspirin  chewable 81 milliGRAM(s) Oral daily  losartan 50 milliGRAM(s) Oral daily  atorvastatin 40 milliGRAM(s) Oral at bedtime  clopidogrel Tablet 75 milliGRAM(s) Oral daily  carvedilol 6.25 milliGRAM(s) Oral every 12 hours  brimonidine 0.2% Ophthalmic Solution 1 Drop(s) Both EYES two times a day  calcium acetate 667 milliGRAM(s) Oral three times a day with meals  pantoprazole    Tablet 40 milliGRAM(s) Oral before breakfast  hydrALAZINE 50 milliGRAM(s) Oral three times a day  multivitamin 1 Tablet(s) Oral daily  cholecalciferol 2000 Unit(s) Oral daily  insulin glargine Injectable (LANTUS) 13 Unit(s) SubCutaneous at bedtime  insulin lispro Injectable (HumaLOG) 4 Unit(s) SubCutaneous three times a day before meals  insulin lispro (HumaLOG) corrective regimen sliding scale   SubCutaneous three times a day before meals  insulin lispro (HumaLOG) corrective regimen sliding scale   SubCutaneous at bedtime  dextrose 5%. 1000 milliLiter(s) (50 mL/Hr) IV Continuous <Continuous>  dextrose 50% Injectable 12.5 Gram(s) IV Push once  dextrose 50% Injectable 25 Gram(s) IV Push once  dextrose 50% Injectable 25 Gram(s) IV Push once    MEDICATIONS  (PRN):  dextrose Gel 1 Dose(s) Oral once PRN Blood Glucose LESS THAN 70 milliGRAM(s)/deciliter  glucagon  Injectable 1 milliGRAM(s) IntraMuscular once PRN Glucose LESS THAN 70 milligrams/deciliter    Vital Signs Last 24 Hrs  T(C): 36.4 (28 Jun 2017 10:30), Max: 36.8 (27 Jun 2017 20:00)  T(F): 97.5 (28 Jun 2017 10:30), Max: 98.3 (27 Jun 2017 20:00)  HR: 81 (28 Jun 2017 11:30) (64 - 81)  BP: 113/56 (28 Jun 2017 11:30) (106/44 - 141/69)  BP(mean): 88 (27 Jun 2017 16:45) (88 - 88)  RR: 18 (28 Jun 2017 11:30) (16 - 18)  SpO2: 100% (28 Jun 2017 11:30) (95% - 100%)    CAPILLARY BLOOD GLUCOSE  258 (28 Jun 2017 11:03)  118 (28 Jun 2017 09:16)  92 (28 Jun 2017 08:49)  72 (28 Jun 2017 08:31)  47 (28 Jun 2017 08:01)  42 (28 Jun 2017 07:43)  48 (28 Jun 2017 07:42)  137 (27 Jun 2017 20:46)        PHYSICAL EXAM:      T(C): 36.4 (28 Jun 2017 10:30), Max: 36.8 (27 Jun 2017 20:00)  HR: 81 (28 Jun 2017 11:30) (64 - 81)  BP: 113/56 (28 Jun 2017 11:30) (106/44 - 141/69)  RR: 18 (28 Jun 2017 11:30) (16 - 18)  SpO2: 100% (28 Jun 2017 11:30) (95% - 100%)  Wt(kg): --  Respiratory: clear anteriorly, decreased BS at bases  Cardiovascular: S1 S2 reg   Gastrointestinal: soft NT ND +BS  Extremities: tr  edema  L AVF + bruit and thrill              06-28    135  |  95<L>  |  61<H>  ----------------------------<  88  4.6   |  23  |  7.13<H>    Ca    7.9<L>      28 Jun 2017 04:29                            11.3   6.7   )-----------( 161      ( 28 Jun 2017 04:29 )             32.4             Assessment and Plan  For cardiac cath; HD post procedure.  Clinically stable.

## 2017-06-28 NOTE — DIETITIAN INITIAL EVALUATION ADULT. - NS AS NUTRI INTERV ED CONTENT
Discussed DASH, consistent carbohydrate, and renal diet, reviewed food high in potassium and phosphorus, limiting concentrated sweets, portion sizes, consuming adequate protein-pt receptive to education, accepted written materials

## 2017-06-28 NOTE — DISCHARGE NOTE ADULT - CARE PLAN
Principal Discharge DX:	Coronary artery disease involving native coronary artery of native heart without angina pectoris  Goal:	Pt remains chest pain free and understands post cath discharge instructions  Instructions for follow-up, activity and diet:	Do not stop you Aspirin or Plavix unless instructed to do so by your cardiologist.   No heavy lifting, strenuous activity, bending, straining, or unnecessary stair climbing for 2 weeks. No driving for 2 days. You may shower 24 hours following the procedure but avoid baths/swimming for 1 week. Check your groin site for bleeding and/or swelling daily following procedure and call your doctor immediately if it occurs or if you experience increased pain at the site. Follow up with your cardiologist in 1-2 weeks. You may call [facility] if you have any questions/concerns regarding your procedure [phone].   Lifestyle modification: include healthy habits to prevent stroke and future CAD  Low salt, low fat diet.   Weight management.   Take medications as prescribed.    No smoking.  Follow up with your cardiologist or primary doctor in 1- 2 weeks. Principal Discharge DX:	Coronary artery disease involving native coronary artery of native heart without angina pectoris  Goal:	Pt remains chest pain free and understands post cath discharge instructions  Instructions for follow-up, activity and diet:	Do not stop you Aspirin or Plavix unless instructed to do so by your cardiologist.   No heavy lifting, strenuous activity, bending, straining, or unnecessary stair climbing for 2 weeks. No driving for 2 days. You may shower 24 hours following the procedure but avoid baths/swimming for 1 week. Check your groin site for bleeding and/or swelling daily following procedure and call your doctor immediately if it occurs or if you experience increased pain at the site. Follow up with your cardiologist in 1-2 weeks. You may call [facility] if you have any questions/concerns regarding your procedure [phone].   Lifestyle modification: include healthy habits to prevent stroke and future CAD  Low salt, low fat diet.   Weight management.   Take medications as prescribed.    No smoking.  Follow up with your cardiologist or primary doctor in 1- 2 weeks.  No heavy lifting for 2 weeks, no strenuous activity  or uneccesary stair climbing, no driving for x 2 days,  you may shower 24 hours following procedure but no bathing or swimming for x1  week, no bending, no straining while having a Bowel movement, No strenuous sexual activity for x 1 week check groin for bleeding, pain, tightness or ( golf ball size)  swelling daily following procedure , & follow up with your cardiologist in 1-2 week  Secondary Diagnosis:	ESRD (end stage renal disease) on dialysis  Instructions for follow-up, activity and diet:	Attend your dialysis session on 6/30 and as scheduled there after  Secondary Diagnosis:	Essential hypertension  Goal:	Your blood pressure will be controlled.  Instructions for follow-up, activity and diet:	Continue with your blood pressure medications; eat a heart healthy diet with low salt diet; exercise regularly (consult with your physician or cardiologist first); maintain a heart healthy weight; if you smoke - quit (A resource to help you stop smoking is the [facility] – phone number 730-312-9762.); include healthy ways to manage stress. Continue to follow with your primary care physician or cardiologist.  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 Discuss what the goal hemoglobin A1C level is for you.  Follow a heart healthy diabetic diet. If you check your fingerstick glucose at home, call your MD if it is greater than 250mg/dL on 2 occasions or less than 100mg/dL on 2 occasions. Know signs of low blood sugar, such as: dizziness, shakiness, sweating, confusion, hunger, nervousness- drink 4 ounces apple juice if occurs and call your doctor. Know early signs of high blood sugar, such as: frequent urination, increased thirst, blurry vision, fatigue, headache - call your doctor if this occurs. Principal Discharge DX:	Coronary artery disease involving native coronary artery of native heart without angina pectoris  Goal:	Pt remains chest pain free and understands post cath discharge instructions  Instructions for follow-up, activity and diet:	Do not stop you Aspirin or Plavix unless instructed to do so by your cardiologist.   No heavy lifting, strenuous activity, bending, straining, or unnecessary stair climbing for 2 weeks. No driving for 2 days. You may shower 24 hours following the procedure but avoid baths/swimming for 1 week. Check your groin site for bleeding and/or swelling daily following procedure and call your doctor immediately if it occurs or if you experience increased pain at the site. Follow up with your cardiologist in 1-2 weeks. You may call [facility] if you have any questions/concerns regarding your procedure [phone].   Lifestyle modification: include healthy habits to prevent stroke and future CAD  Low salt, low fat diet.   Weight management.   Take medications as prescribed.    No smoking.  Follow up with your cardiologist or primary doctor in 1- 2 weeks.  No heavy lifting for 2 weeks, no strenuous activity  or uneccesary stair climbing, no driving for x 2 days,  you may shower 24 hours following procedure but no bathing or swimming for x1  week, no bending, no straining while having a Bowel movement, No strenuous sexual activity for x 1 week check groin for bleeding, pain, tightness or ( golf ball size)  swelling daily following procedure , & follow up with your cardiologist in 1-2 week  Secondary Diagnosis:	ESRD (end stage renal disease) on dialysis  Instructions for follow-up, activity and diet:	Attend your dialysis session on 6/30 and as scheduled there after  Secondary Diagnosis:	Essential hypertension  Goal:	Your blood pressure will be controlled.  Instructions for follow-up, activity and diet:	Continue with your blood pressure medications; eat a heart healthy diet with low salt diet; exercise regularly (consult with your physician or cardiologist first); maintain a heart healthy weight; if you smoke - quit (A resource to help you stop smoking is the [facility] – phone number [phone].); include healthy ways to manage stress. Continue to follow with your primary care physician or cardiologist.  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 Discuss what the goal hemoglobin A1C level is for you.  Follow a heart healthy diabetic diet. If you check your fingerstick glucose at home, call your MD if it is greater than 250mg/dL on 2 occasions or less than 100mg/dL on 2 occasions. Know signs of low blood sugar, such as: dizziness, shakiness, sweating, confusion, hunger, nervousness- drink 4 ounces apple juice if occurs and call your doctor. Know early signs of high blood sugar, such as: frequent urination, increased thirst, blurry vision, fatigue, headache - call your doctor if this occurs. Principal Discharge DX:	Coronary artery disease involving native coronary artery of native heart without angina pectoris  Goal:	Pt remains chest pain free and understands post cath discharge instructions  Instructions for follow-up, activity and diet:	Do not stop you Aspirin or Plavix unless instructed to do so by your cardiologist.   No heavy lifting, strenuous activity, bending, straining, or unnecessary stair climbing for 2 weeks. No driving for 2 days. You may shower 24 hours following the procedure but avoid baths/swimming for 1 week. Check your groin site for bleeding and/or swelling daily following procedure and call your doctor immediately if it occurs or if you experience increased pain at the site. Follow up with your cardiologist in 1-2 weeks. You may call [facility] if you have any questions/concerns regarding your procedure [phone].   Lifestyle modification: include healthy habits to prevent stroke and future CAD  Low salt, low fat diet.   Weight management.   Take medications as prescribed.    No smoking.  Follow up with your cardiologist or primary doctor in 1- 2 weeks.  No heavy lifting for 2 weeks, no strenuous activity  or uneccesary stair climbing, no driving for x 2 days,  you may shower 24 hours following procedure but no bathing or swimming for x1  week, no bending, no straining while having a Bowel movement, No strenuous sexual activity for x 1 week check groin for bleeding, pain, tightness or ( golf ball size)  swelling daily following procedure , & follow up with your cardiologist in 1-2 week  Secondary Diagnosis:	ESRD (end stage renal disease) on dialysis  Instructions for follow-up, activity and diet:	Attend your dialysis session on 6/30 and as scheduled there after  Secondary Diagnosis:	Essential hypertension  Goal:	Your blood pressure will be controlled.  Instructions for follow-up, activity and diet:	Continue with your blood pressure medications; eat a heart healthy diet with low salt diet; exercise regularly (consult with your physician or cardiologist first); maintain a heart healthy weight; if you smoke - quit (A resource to help you stop smoking is the [facility] – phone number [phone].); include healthy ways to manage stress. Continue to follow with your primary care physician or cardiologist.  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 Discuss what the goal hemoglobin A1C level is for you.  Follow a heart healthy diabetic diet. If you check your fingerstick glucose at home, call your MD if it is greater than 250mg/dL on 2 occasions or less than 100mg/dL on 2 occasions. Know signs of low blood sugar, such as: dizziness, shakiness, sweating, confusion, hunger, nervousness- drink 4 ounces apple juice if occurs and call your doctor. Know early signs of high blood sugar, such as: frequent urination, increased thirst, blurry vision, fatigue, headache - call your doctor if this occurs.

## 2017-06-28 NOTE — DISCHARGE NOTE ADULT - PLAN OF CARE
Pt remains chest pain free and understands post cath discharge instructions Do not stop you Aspirin or Plavix unless instructed to do so by your cardiologist.   No heavy lifting, strenuous activity, bending, straining, or unnecessary stair climbing for 2 weeks. No driving for 2 days. You may shower 24 hours following the procedure but avoid baths/swimming for 1 week. Check your groin site for bleeding and/or swelling daily following procedure and call your doctor immediately if it occurs or if you experience increased pain at the site. Follow up with your cardiologist in 1-2 weeks. You may call [facility] if you have any questions/concerns regarding your procedure [phone].   Lifestyle modification: include healthy habits to prevent stroke and future CAD  Low salt, low fat diet.   Weight management.   Take medications as prescribed.    No smoking.  Follow up with your cardiologist or primary doctor in 1- 2 weeks. Attend your dialysis session on 6/30 and as scheduled there after Your blood pressure will be controlled. Continue with your blood pressure medications; eat a heart healthy diet with low salt diet; exercise regularly (consult with your physician or cardiologist first); maintain a heart healthy weight; if you smoke - quit (A resource to help you stop smoking is the [facility] – phone number [phone].); include healthy ways to manage stress. Continue to follow with your primary care physician or cardiologist. Your hemoglobin A1C will be at a normal range (normal range is from 6-8) Continue to follow with your primary care MD or your endocrinologist. Discuss what the goal hemoglobin A1C level is for you.  Follow a heart healthy diabetic diet. If you check your fingerstick glucose at home, call your MD if it is greater than 250mg/dL on 2 occasions or less than 100mg/dL on 2 occasions. Know signs of low blood sugar, such as: dizziness, shakiness, sweating, confusion, hunger, nervousness- drink 4 ounces apple juice if occurs and call your doctor. Know early signs of high blood sugar, such as: frequent urination, increased thirst, blurry vision, fatigue, headache - call your doctor if this occurs. Do not stop you Aspirin or Plavix unless instructed to do so by your cardiologist.   No heavy lifting, strenuous activity, bending, straining, or unnecessary stair climbing for 2 weeks. No driving for 2 days. You may shower 24 hours following the procedure but avoid baths/swimming for 1 week. Check your groin site for bleeding and/or swelling daily following procedure and call your doctor immediately if it occurs or if you experience increased pain at the site. Follow up with your cardiologist in 1-2 weeks. You may call [facility] if you have any questions/concerns regarding your procedure [phone].   Lifestyle modification: include healthy habits to prevent stroke and future CAD  Low salt, low fat diet.   Weight management.   Take medications as prescribed.    No smoking.  Follow up with your cardiologist or primary doctor in 1- 2 weeks.  No heavy lifting for 2 weeks, no strenuous activity  or uneccesary stair climbing, no driving for x 2 days,  you may shower 24 hours following procedure but no bathing or swimming for x1  week, no bending, no straining while having a Bowel movement, No strenuous sexual activity for x 1 week check groin for bleeding, pain, tightness or ( golf ball size)  swelling daily following procedure , & follow up with your cardiologist in 1-2 week

## 2017-06-28 NOTE — PROGRESS NOTE ADULT - PROBLEM SELECTOR PLAN 2
Your blood pressure will be controlled.   Continue with your blood pressure medications; eat a heart healthy diet with low salt diet; exercise regularly (consult with your physician or cardiologist first); maintain a heart healthy weight; if you smoke - quit (A resource to help you stop smoking is the [facility] – phone number [phone].); include healthy ways to manage stress. Continue to follow with your primary care physician or cardiologist.

## 2017-06-28 NOTE — PROGRESS NOTE ADULT - PROBLEM SELECTOR PLAN 1
Pt remains chest pain free and understands post cath discharge instructions   No heavy lifting, strenuous activity, bending, straining or unnecessary stair climbing  for 2 weeks. No sex for 1 week.  No driving for 2 days. You may shower 24 hours following procedure but avoid baths and swimming for 1 week. Check groin site for bleeding and/or swelling daily following procedure. Call your doctor/cardiologist immediately should it occur or if you have increased/persistent pain at the site. Follow up with your cardiologist in 1- 2 weeks. You may call [facility] at [phone] or [phone] after office hours and weekends  with any questions or concerns following your procedure. Take medications as prescribed.

## 2017-06-28 NOTE — DISCHARGE NOTE ADULT - MEDICATION SUMMARY - MEDICATIONS TO STOP TAKING
I will STOP taking the medications listed below when I get home from the hospital:    simvastatin 20 mg oral tablet  -- 1 tab(s) by mouth once a day (at bedtime) - hospital    labetalol 200 mg oral tablet  -- 1 tab(s) by mouth 2 times a day - hospital    hydrALAZINE 50 mg oral tablet  -- 1 tab(s) by mouth 3 times a day - hospital

## 2017-06-28 NOTE — DISCHARGE NOTE ADULT - MEDICATION SUMMARY - MEDICATIONS TO CHANGE
I will SWITCH the dose or number of times a day I take the medications listed below when I get home from the hospital:    Lantus Solostar Pen 100 units/mL subcutaneous solution  -- 15 unit(s) subcutaneous once a day in the morning - home (IN HOSPITAL TAKING 20 UNITS)    HumaLOG KwikPen 100 units/mL subcutaneous solution  -- 6 unit(s) subcutaneous 3 times a day (before meals) - home

## 2017-06-28 NOTE — PROGRESS NOTE ADULT - SUBJECTIVE AND OBJECTIVE BOX
Patient is a 62y old  Male who presents with a chief complaint of s/p cardiac cath (2017 20:21)        PAST MEDICAL & SURGICAL HISTORY:  Anemia in chronic kidney disease  NSTEMI (non-ST elevated myocardial infarction)  Heart failure, unspecified heart failure chronicity, unspecified heart failure type  Coronary artery disease involving native coronary artery of native heart without angina pectoris  ESRD (end stage renal disease) on dialysis: M-W-F At [facility]  CKD (chronic kidney disease)  Diabetes mellitus type 2 in nonobese  Hypertension  S/P cardiac catheterization: Moderate to severe prox LAD disease. Multiple small  vessels with severe disease - too small for PCI (OM1, OM2, D1, D2).  AV fistula infection: Left forearm  S/P angioplasty with stent: 2017 mLAD 98% x 1      Summary of admission HPI:                PREVIOUS DIAGNOSTIC TESTING:      ECHO  FINDINGS:    STRESS  FINDINGS:    CATHETERIZATION  FINDINGS:    ELECTROPHYSIOLOGY STUDY  FINDINGS:    CAROTID ULTRASOUND:  FINDINGS    VENOUS DUPLEX SCAN:  FINDINGS:    CHEST CT PULMONARY ANGIO with IV Contrast:  FINDINGS:  MEDICATIONS  (STANDING):  epoetin mayelin Injectable 4000 Unit(s) IV Push <User Schedule>  clopidogrel Tablet 600 milliGRAM(s) Oral once  aspirin  chewable 81 milliGRAM(s) Oral daily  losartan 50 milliGRAM(s) Oral daily  atorvastatin 40 milliGRAM(s) Oral at bedtime  clopidogrel Tablet 75 milliGRAM(s) Oral daily  carvedilol 6.25 milliGRAM(s) Oral every 12 hours  brimonidine 0.2% Ophthalmic Solution 1 Drop(s) Both EYES two times a day  calcium acetate 667 milliGRAM(s) Oral three times a day with meals  pantoprazole    Tablet 40 milliGRAM(s) Oral before breakfast  hydrALAZINE 50 milliGRAM(s) Oral three times a day  multivitamin 1 Tablet(s) Oral daily  cholecalciferol 2000 Unit(s) Oral daily  insulin glargine Injectable (LANTUS) 8 Unit(s) SubCutaneous at bedtime  insulin lispro Injectable (HumaLOG) 2 Unit(s) SubCutaneous before breakfast  insulin lispro Injectable (HumaLOG) 2 Unit(s) SubCutaneous before lunch  insulin lispro Injectable (HumaLOG) 2 Unit(s) SubCutaneous before dinner  insulin lispro (HumaLOG) corrective regimen sliding scale   SubCutaneous three times a day before meals  insulin lispro (HumaLOG) corrective regimen sliding scale   SubCutaneous at bedtime  dextrose 5%. 1000 milliLiter(s) (50 mL/Hr) IV Continuous <Continuous>  dextrose 50% Injectable 12.5 Gram(s) IV Push once  dextrose 50% Injectable 25 Gram(s) IV Push once  dextrose 50% Injectable 25 Gram(s) IV Push once    MEDICATIONS  (PRN):  dextrose Gel 1 Dose(s) Oral once PRN Blood Glucose LESS THAN 70 milliGRAM(s)/deciliter  glucagon  Injectable 1 milliGRAM(s) IntraMuscular once PRN Glucose LESS THAN 70 milligrams/deciliter      FAMILY HISTORY:  No pertinent family history in first degree relatives      SOCIAL HISTORY:    CIGARETTES:    ALCOHOL:    REVIEW OF SYSTEMS:    CONSTITUTIONAL: No fever, weight loss, chills, shakes, or fatigue  EYES: No eye pain, visual disturbances, or discharge  ENMT:  No difficulty hearing, tinnitus, vertigo; No sinus or throat pain  NECK: No pain or stiffness  BREASTS: No pain, masses, or nipple discharge  RESPIRATORY: No cough, wheezing, hemoptysis, or shortness of breath  CARDIOVASCULAR: No chest pain, dyspnea, palpitations, dizziness, syncope, paroxysmal nocturnal dyspnea, orthopnea, or arm or leg swelling  GASTROINTESTINAL: No abdominal  or epigastric pain, nausea, vomiting, hematemesis, diarrhea, constipation, melena or bright red blood.  GENITOURINARY: No dysuria, nocturia, hematuria, or urinary incontinence  NEUROLOGICAL: No headaches, memory loss, slurred speech, limb weakness, loss of strength, numbness, or tremors  SKIN: No itching, burning, rashes, or lesions   LYMPH NODES: No enlarged glands  ENDOCRINE: No heat or cold intolerance, or hair loss  MUSCULOSKELETAL: No joint pain or swelling, muscle, back, or extremity pain  PSYCHIATRIC: No depression, anxiety, or difficulty sleeping  HEME/LYMPH: No easy bruising or bleeding gums  ALLERY AND IMMUNOLOGIC: No hives or rash.      Vital Signs Last 24 Hrs  T(C): 36.8 (2017 20:50), Max: 36.8 (2017 20:50)  T(F): 98.2 (2017 20:50), Max: 98.2 (2017 20:50)  HR: 72 (2017 20:50) (64 - 83)  BP: 109/57 (2017 20:50) (105/49 - 127/65)  BP(mean): --  RR: 17 (2017 20:50) (17 - 18)  SpO2: 99% (2017 20:50) (97% - 100%)    PHYSICAL EXAM:    GENERAL: In no apparent distress, well nourished, and hydrated.  HEAD:  Atraumatic, Normocephalic  EYES: EOMI, PERRLA, conjunctiva and sclera clear  ENMT: No tonsillar erythema, exudates, or enlargement; Moist mucous membranes, Good dentition, No lesions  NECK: Supple and normal thyroid.  No JVD or carotid bruit.  Carotid pulse is 2+ bilaterally.  HEART: Regular rate and rhythm; No murmurs, rubs, or gallops.  PULMONARY: Clear to auscultation and perfusion.  No rales, wheezing, or rhonchi bilaterally.  ABDOMEN: Soft, Nontender, Nondistended; Bowel sounds present  EXTREMITIES:  2+ Peripheral Pulses, No clubbing, cyanosis, or edema  LYMPH: No lymphadenopathy noted  NEUROLOGICAL: Grossly nonfocal          INTERPRETATION OF TELEMETRY:    ECG:    I&O's Detail    2017 07:  -  2017 07:00  --------------------------------------------------------  IN:    Oral Fluid: 520 mL  Total IN: 520 mL    OUT:    Voided: 700 mL  Total OUT: 700 mL    Total NET: -180 mL      2017 07:01  -  2017 21:50  --------------------------------------------------------  IN:    Oral Fluid: 240 mL  Total IN: 240 mL    OUT:  Total OUT: 0 mL    Total NET: 240 mL          LABS:                        11.3   6.7   )-----------( 161      ( 2017 04:29 )             32.4     -    135  |  95<L>  |  61<H>  ----------------------------<  88  4.6   |  23  |  7.13<H>    Ca    7.9<L>      2017 04:29              BNP  I&O's Detail    2017 07:  -  2017 07:00  --------------------------------------------------------  IN:    Oral Fluid: 520 mL  Total IN: 520 mL    OUT:    Voided: 700 mL  Total OUT: 700 mL    Total NET: -180 mL      2017 07:  -  2017 21:50  --------------------------------------------------------  IN:    Oral Fluid: 240 mL  Total IN: 240 mL    OUT:  Total OUT: 0 mL    Total NET: 240 mL        Daily     Daily Weight in k (2017 20:50)    RADIOLOGY & ADDITIONAL STUDIES:

## 2017-06-28 NOTE — PROGRESS NOTE ADULT - PROBLEM SELECTOR PLAN 3
Your hemoglobin A1C will be between 7-8   Continue to follow with your primary care MD or your endocrinologist.  Follow a heart healthy diabetic diet. If you check your fingerstick glucose at home, call your MD if it is greater than 250mg/dL on 2 occasions or less than 100mg/dL on 2 occasions. Know signs of low blood sugar, such as: dizziness, shakiness, sweating, confusion, hunger, nervousness-drink 4 ounces apple juice if occurs and call your doctor. Know early signs of high blood sugar, such as: frequent urination, increased thirst, blurry vision, fatigue, headache - call your doctor if this occurs. Follow with other practitioners to care for your diabetes, such as ophthamologist and podiatrist.

## 2017-06-28 NOTE — DIETITIAN INITIAL EVALUATION ADULT. - ORAL INTAKE PTA
good/Pt reports eating well with good appetite consuming 3 meals per day prepared by wife with no recent changes in appetite or intake. Diet recall: breakfast: eggs, bread, coffee, Lunch/Dinner: protein (usually chicken), rice, veggies (carrots, beets etc)

## 2017-06-29 VITALS
HEART RATE: 76 BPM | OXYGEN SATURATION: 100 % | TEMPERATURE: 98 F | DIASTOLIC BLOOD PRESSURE: 65 MMHG | SYSTOLIC BLOOD PRESSURE: 127 MMHG | RESPIRATION RATE: 18 BRPM

## 2017-06-29 DIAGNOSIS — Z79.82 LONG TERM (CURRENT) USE OF ASPIRIN: ICD-10-CM

## 2017-06-29 DIAGNOSIS — E10.22 TYPE 1 DIABETES MELLITUS WITH DIABETIC CHRONIC KIDNEY DISEASE: ICD-10-CM

## 2017-06-29 DIAGNOSIS — Z79.84 LONG TERM (CURRENT) USE OF ORAL HYPOGLYCEMIC DRUGS: ICD-10-CM

## 2017-06-29 DIAGNOSIS — I50.21 ACUTE SYSTOLIC (CONGESTIVE) HEART FAILURE: ICD-10-CM

## 2017-06-29 DIAGNOSIS — Z79.4 LONG TERM (CURRENT) USE OF INSULIN: ICD-10-CM

## 2017-06-29 DIAGNOSIS — E87.70 FLUID OVERLOAD, UNSPECIFIED: ICD-10-CM

## 2017-06-29 DIAGNOSIS — Z99.2 DEPENDENCE ON RENAL DIALYSIS: ICD-10-CM

## 2017-06-29 DIAGNOSIS — I50.9 HEART FAILURE, UNSPECIFIED: ICD-10-CM

## 2017-06-29 DIAGNOSIS — E11.22 TYPE 2 DIABETES MELLITUS WITH DIABETIC CHRONIC KIDNEY DISEASE: ICD-10-CM

## 2017-06-29 DIAGNOSIS — N18.6 END STAGE RENAL DISEASE: ICD-10-CM

## 2017-06-29 DIAGNOSIS — I25.10 ATHEROSCLEROTIC HEART DISEASE OF NATIVE CORONARY ARTERY WITHOUT ANGINA PECTORIS: ICD-10-CM

## 2017-06-29 DIAGNOSIS — R69 ILLNESS, UNSPECIFIED: ICD-10-CM

## 2017-06-29 DIAGNOSIS — I10 ESSENTIAL (PRIMARY) HYPERTENSION: ICD-10-CM

## 2017-06-29 DIAGNOSIS — I13.2 HYPERTENSIVE HEART AND CHRONIC KIDNEY DISEASE WITH HEART FAILURE AND WITH STAGE 5 CHRONIC KIDNEY DISEASE, OR END STAGE RENAL DISEASE: ICD-10-CM

## 2017-06-29 LAB
ANION GAP SERPL CALC-SCNC: 14 MMOL/L — SIGNIFICANT CHANGE UP (ref 5–17)
BUN SERPL-MCNC: 36 MG/DL — HIGH (ref 7–23)
CALCIUM SERPL-MCNC: 8.2 MG/DL — LOW (ref 8.4–10.5)
CHLORIDE SERPL-SCNC: 90 MMOL/L — LOW (ref 96–108)
CO2 SERPL-SCNC: 28 MMOL/L — SIGNIFICANT CHANGE UP (ref 22–31)
CREAT SERPL-MCNC: 5.21 MG/DL — HIGH (ref 0.5–1.3)
GLUCOSE SERPL-MCNC: 237 MG/DL — HIGH (ref 70–99)
HCT VFR BLD CALC: 32.6 % — LOW (ref 39–50)
HGB BLD-MCNC: 11.1 G/DL — LOW (ref 13–17)
MCHC RBC-ENTMCNC: 33.5 PG — SIGNIFICANT CHANGE UP (ref 27–34)
MCHC RBC-ENTMCNC: 34 GM/DL — SIGNIFICANT CHANGE UP (ref 32–36)
MCV RBC AUTO: 98.5 FL — SIGNIFICANT CHANGE UP (ref 80–100)
PLATELET # BLD AUTO: 149 K/UL — LOW (ref 150–400)
POTASSIUM SERPL-MCNC: 4.5 MMOL/L — SIGNIFICANT CHANGE UP (ref 3.5–5.3)
POTASSIUM SERPL-SCNC: 4.5 MMOL/L — SIGNIFICANT CHANGE UP (ref 3.5–5.3)
RBC # BLD: 3.31 M/UL — LOW (ref 4.2–5.8)
RBC # FLD: 14.2 % — SIGNIFICANT CHANGE UP (ref 10.3–14.5)
SODIUM SERPL-SCNC: 132 MMOL/L — LOW (ref 135–145)
WBC # BLD: 6.4 K/UL — SIGNIFICANT CHANGE UP (ref 3.8–10.5)
WBC # FLD AUTO: 6.4 K/UL — SIGNIFICANT CHANGE UP (ref 3.8–10.5)

## 2017-06-29 PROCEDURE — 99223 1ST HOSP IP/OBS HIGH 75: CPT | Mod: GC

## 2017-06-29 PROCEDURE — 93010 ELECTROCARDIOGRAM REPORT: CPT

## 2017-06-29 RX ORDER — CLOPIDOGREL BISULFATE 75 MG/1
1 TABLET, FILM COATED ORAL
Qty: 90 | Refills: 3 | OUTPATIENT
Start: 2017-06-29 | End: 2018-06-23

## 2017-06-29 RX ADMIN — CLOPIDOGREL BISULFATE 75 MILLIGRAM(S): 75 TABLET, FILM COATED ORAL at 05:37

## 2017-06-29 RX ADMIN — CARVEDILOL PHOSPHATE 6.25 MILLIGRAM(S): 80 CAPSULE, EXTENDED RELEASE ORAL at 05:38

## 2017-06-29 RX ADMIN — Medication 2 UNIT(S): at 07:26

## 2017-06-29 RX ADMIN — Medication 2 UNIT(S): at 11:56

## 2017-06-29 RX ADMIN — Medication 667 MILLIGRAM(S): at 11:56

## 2017-06-29 RX ADMIN — LOSARTAN POTASSIUM 50 MILLIGRAM(S): 100 TABLET, FILM COATED ORAL at 05:37

## 2017-06-29 RX ADMIN — Medication 667 MILLIGRAM(S): at 17:28

## 2017-06-29 RX ADMIN — Medication 2 UNIT(S): at 17:29

## 2017-06-29 RX ADMIN — CARVEDILOL PHOSPHATE 6.25 MILLIGRAM(S): 80 CAPSULE, EXTENDED RELEASE ORAL at 17:28

## 2017-06-29 RX ADMIN — Medication 2000 UNIT(S): at 11:56

## 2017-06-29 RX ADMIN — BRIMONIDINE TARTRATE 1 DROP(S): 2 SOLUTION/ DROPS OPHTHALMIC at 17:28

## 2017-06-29 RX ADMIN — PANTOPRAZOLE SODIUM 40 MILLIGRAM(S): 20 TABLET, DELAYED RELEASE ORAL at 05:37

## 2017-06-29 RX ADMIN — Medication 3: at 07:25

## 2017-06-29 RX ADMIN — Medication 50 MILLIGRAM(S): at 05:37

## 2017-06-29 RX ADMIN — Medication 3: at 17:29

## 2017-06-29 RX ADMIN — Medication 81 MILLIGRAM(S): at 05:37

## 2017-06-29 RX ADMIN — Medication 667 MILLIGRAM(S): at 07:25

## 2017-06-29 RX ADMIN — Medication 1 TABLET(S): at 11:56

## 2017-06-29 NOTE — PROGRESS NOTE ADULT - SUBJECTIVE AND OBJECTIVE BOX
Patient is a 62y old  Male who presents with a chief complaint of s/p cardiac cath (28 Jun 2017 22:58)      SUBJECTIVE / OVERNIGHT EVENTS: Comfortable without new complaints.   Review of Systems  chest pain no  palpitations no  sob no  nausea no  headache no    MEDICATIONS  (STANDING):  epoetin mayelin Injectable 4000 Unit(s) IV Push <User Schedule>  clopidogrel Tablet 600 milliGRAM(s) Oral once  aspirin  chewable 81 milliGRAM(s) Oral daily  losartan 50 milliGRAM(s) Oral daily  atorvastatin 40 milliGRAM(s) Oral at bedtime  clopidogrel Tablet 75 milliGRAM(s) Oral daily  carvedilol 6.25 milliGRAM(s) Oral every 12 hours  brimonidine 0.2% Ophthalmic Solution 1 Drop(s) Both EYES two times a day  calcium acetate 667 milliGRAM(s) Oral three times a day with meals  pantoprazole    Tablet 40 milliGRAM(s) Oral before breakfast  hydrALAZINE 50 milliGRAM(s) Oral three times a day  multivitamin 1 Tablet(s) Oral daily  cholecalciferol 2000 Unit(s) Oral daily  insulin glargine Injectable (LANTUS) 8 Unit(s) SubCutaneous at bedtime  insulin lispro Injectable (HumaLOG) 2 Unit(s) SubCutaneous before breakfast  insulin lispro Injectable (HumaLOG) 2 Unit(s) SubCutaneous before lunch  insulin lispro Injectable (HumaLOG) 2 Unit(s) SubCutaneous before dinner  insulin lispro (HumaLOG) corrective regimen sliding scale   SubCutaneous three times a day before meals  insulin lispro (HumaLOG) corrective regimen sliding scale   SubCutaneous at bedtime  dextrose 5%. 1000 milliLiter(s) (50 mL/Hr) IV Continuous <Continuous>  dextrose 50% Injectable 12.5 Gram(s) IV Push once  dextrose 50% Injectable 25 Gram(s) IV Push once  dextrose 50% Injectable 25 Gram(s) IV Push once    MEDICATIONS  (PRN):  dextrose Gel 1 Dose(s) Oral once PRN Blood Glucose LESS THAN 70 milliGRAM(s)/deciliter  glucagon  Injectable 1 milliGRAM(s) IntraMuscular once PRN Glucose LESS THAN 70 milligrams/deciliter      Vital Signs Last 24 Hrs  T(C): 36.9 (06-29-17 @ 17:32), Max: 36.9 (06-29-17 @ 07:29)  HR: 76 (06-29-17 @ 17:32) (72 - 90)  BP: 127/65 (06-29-17 @ 17:32) (95/41 - 131/55)  RR: 18 (06-29-17 @ 17:32) (17 - 18)  SpO2: 100% (06-29-17 @ 17:32) (98% - 100%)  CAPILLARY BLOOD GLUCOSE  280 (29 Jun 2017 17:17)  86 (29 Jun 2017 11:09)  259 (29 Jun 2017 07:20)  233 (29 Jun 2017 00:54)  158 (28 Jun 2017 21:53)  80 (28 Jun 2017 18:48)        I&O's Summary    28 Jun 2017 07:01  -  29 Jun 2017 07:00  --------------------------------------------------------  IN: 390 mL / OUT: 0 mL / NET: 390 mL    29 Jun 2017 07:01  -  29 Jun 2017 17:44  --------------------------------------------------------  IN: 270 mL / OUT: 0 mL / NET: 270 mL        PHYSICAL EXAM:  GENERAL: NAD, well-developed  HEAD:  Atraumatic, Normocephalic  EYES: EOMI, PERRLA, conjunctiva and sclera clear  NECK: Supple, No JVD  CHEST/LUNG: Clear to auscultation bilaterally; No wheeze  HEART: Regular rate and rhythm; No murmurs, rubs, or gallops  ABDOMEN: Soft, Nontender, Nondistended; Bowel sounds present  EXTREMITIES:  2+ Peripheral Pulses, No clubbing, cyanosis, or edema  PSYCH: AAOx3  NEUROLOGY: non-focal  SKIN: No rashes or lesions    LABS:                        11.1   6.4   )-----------( 149      ( 29 Jun 2017 03:59 )             32.6     06-29    132<L>  |  90<L>  |  36<H>  ----------------------------<  237<H>  4.5   |  28  |  5.21<H>    Ca    8.2<L>      29 Jun 2017 03:59                RADIOLOGY & ADDITIONAL TESTS:    Imaging Personally Reviewed:    Consultant(s) Notes Reviewed:      Care Discussed with Consultants/Other Providers:

## 2017-06-29 NOTE — CONSULT NOTE ADULT - PROBLEM SELECTOR RECOMMENDATION 4
Noted to have severe systolic dysfunction and the goal for diabetes management is to avoid hypoglycemia and maintain HbA1c at 7%. Recommend continue close cardiology and endocrinology follow-ups.    D/w attending    Britni Castaneda DO  Endocrine Fellow   Pager: [phone].

## 2017-06-29 NOTE — CONSULT NOTE ADULT - ASSESSMENT
61 y/o male with likely uncontrolled AMY or T1DM with complications of retinopathy, ESRD on HD, CAD s/p LAD stent this admission and frequent hypoglycemia with labile FS.

## 2017-06-29 NOTE — CONSULT NOTE ADULT - PROBLEM SELECTOR RECOMMENDATION 9
Patient most likely has T1DM as he is on very little insulin and very sensitive to insulin in the setting of no personal history of obesity and no family history of obesity or diabetes. He has chronic complications of long standing uncontrolled diabetes, commonly seen in patients with brittle type 1 diabetes. I recommend further testing with C-peptide and MINI/islet cell Ab in the outpatient setting at [facility] endocrine ambulatory clinic.    For outpatient - I recommend Lantus 10 units QHS and Humalog 3 units TID AC. Recommend continue finger sticks TID AC and at HS. Hypoglycemic symptoms and correction reviewed with the patient. F/u at [facility] endocrine ambulatory care unit (Call [phone] for appointment).

## 2017-06-29 NOTE — PROGRESS NOTE ADULT - ASSESSMENT
62 m s/p cath with stent LAD  post cath care  ESRD for HD./nephrology follow.  Diabetes control/ Endocrine evaluation for poorly controlled diabetes.  cardiology follow.  DCP home with f/u PMD/Cardiology/endocrine.

## 2017-06-29 NOTE — CONSULT NOTE ADULT - SUBJECTIVE AND OBJECTIVE BOX
Reason For Consult:     HPI:  61 y/o male with pmh of HTN, DM2 (uncontrolled AIC 11.8 c/w peripheral neuropathy), ESRD on HD M-W-F (last HD 6/26/17 Dr. Rodriguez, Nephrologist), CAD, diagnostic cath with Moderate to severe prox LAD disease. Multiple small vessels with severe disease - too small for PCI (OM1, OM2, D1, D2) 6/2016, NSTEMI with mLAD EDGAR x 1 98% (1/2017), RVP (1/2017), Anemia of CKD presented to [facility] with reports of dyspnea for the past 2 weeks at rest and reports going to routine HD appointments.  Pt denies cp, fevers or chills.  TTE 1/2017 revealed LVH, severe segmental left ventricular systolic dysfunction. Severe hypokinesis of the mid and apical septum, anterior wall, apex.  Pt treated for Acute systolic heart failure (no EF documented), negative Cardiac biomarkers pt dialyzed 6/26 and transferred for cardiac cath for further evaluation.  Pt denies cp sob or palpitations presently.  Pt states "I do not remember when I took my plavix last" No plavix given at [facility].  D/W Dr. Neville pt loaded with 600mg Plavix prior to cath with ASA 81 given today at OSH. Pt states has not followed up with a cardiologist after seeing Dr. Carroll, Cardiologist (did the PCI 1/17)  5- months ago.        Endocrine History: No endocrinologist. T2DM diagnosed 23 years ago and complicated with retinopathy s/p laser surgery in left eye, ESRD and has been on HD x 1 year, s/p LAD stent this admission with severe systolic dysfunction. He takes Lantus 15u QHS and Humalog 6-8u TID AC. Eats 3 meals and occasionally skips meals. States FS are labile at home and will 1 episode of recognized hypoglycemia per week. Eats breakfast at 4am, includes eggs, bread, coffee, milk. Goes to HD from 6am-10am. Lunch and dinner will include salads, farina, oatmeal tortilla. Checks FS 3x/day and states ranges from 130-200 and sometimes will take only 8-10 units and FS will be very low.   Noted to have severe systolic dysfunction on echo, EF not mentioned and he is s/p LAD stent      PAST MEDICAL & SURGICAL HISTORY:  Anemia in chronic kidney disease  NSTEMI (non-ST elevated myocardial infarction)  Heart failure, unspecified heart failure chronicity, unspecified heart failure type  Coronary artery disease involving native coronary artery of native heart without angina pectoris  ESRD (end stage renal disease) on dialysis: M-W-F At [facility]  CKD (chronic kidney disease)  Diabetes mellitus type 2 in nonobese  Hypertension  S/P cardiac catheterization: Moderate to severe prox LAD disease. Multiple small  vessels with severe disease - too small for PCI (OM1, OM2, D1, D2).  AV fistula infection: Left forearm  S/P angioplasty with stent: 2017 mLAD 98% x 1      FAMILY HISTORY:  No pertinent family history in first degree or any relatives    Social History: No tobacco, alcohol, illicit drug use.      Outpatient Medications: Lantus 15u QHS and Humalog 6-8u TID AC. See Rx writer for all home meds    MEDICATIONS  (STANDING):  epoetin mayelin Injectable 4000 Unit(s) IV Push <User Schedule>  clopidogrel Tablet 600 milliGRAM(s) Oral once  aspirin  chewable 81 milliGRAM(s) Oral daily  losartan 50 milliGRAM(s) Oral daily  atorvastatin 40 milliGRAM(s) Oral at bedtime  clopidogrel Tablet 75 milliGRAM(s) Oral daily  carvedilol 6.25 milliGRAM(s) Oral every 12 hours  brimonidine 0.2% Ophthalmic Solution 1 Drop(s) Both EYES two times a day  calcium acetate 667 milliGRAM(s) Oral three times a day with meals  pantoprazole    Tablet 40 milliGRAM(s) Oral before breakfast  hydrALAZINE 50 milliGRAM(s) Oral three times a day  multivitamin 1 Tablet(s) Oral daily  cholecalciferol 2000 Unit(s) Oral daily  insulin glargine Injectable (LANTUS) 8 Unit(s) SubCutaneous at bedtime  insulin lispro Injectable (HumaLOG) 2 Unit(s) SubCutaneous before breakfast  insulin lispro Injectable (HumaLOG) 2 Unit(s) SubCutaneous before lunch  insulin lispro Injectable (HumaLOG) 2 Unit(s) SubCutaneous before dinner  insulin lispro (HumaLOG) corrective regimen sliding scale   SubCutaneous three times a day before meals  insulin lispro (HumaLOG) corrective regimen sliding scale   SubCutaneous at bedtime  dextrose 5%. 1000 milliLiter(s) (50 mL/Hr) IV Continuous <Continuous>  dextrose 50% Injectable 12.5 Gram(s) IV Push once  dextrose 50% Injectable 25 Gram(s) IV Push once  dextrose 50% Injectable 25 Gram(s) IV Push once    MEDICATIONS  (PRN):  dextrose Gel 1 Dose(s) Oral once PRN Blood Glucose LESS THAN 70 milliGRAM(s)/deciliter  glucagon  Injectable 1 milliGRAM(s) IntraMuscular once PRN Glucose LESS THAN 70 milligrams/deciliter      Allergies    No Known Allergies    Intolerances      Review of Systems:  Constitutional: No fever  Eyes: No blurry vision  Neuro: No tremors  HEENT: No pain  Cardiovascular: + chest pain, palpitations  Respiratory: No SOB, no cough  GI: No nausea, vomiting, abdominal pain  : No dysuria  Skin: no rash  Psych: no depression  Endocrine: no polyuria, polydipsia, Admits to brittle diabetes at home  Hem/lymph: no swelling  Osteoporosis: no fractures    ALL OTHER SYSTEMS REVIEWED AND NEGATIVE    PHYSICAL EXAM:  VITALS: T(C): 36.8 (06-29-17 @ 13:20)  T(F): 98.2 (06-29-17 @ 13:20), Max: 98.5 (06-29-17 @ 07:29)  HR: 79 (06-29-17 @ 13:20) (72 - 90)  BP: 100/52 (06-29-17 @ 13:23) (95/41 - 131/55)  RR:  (17 - 18)  SpO2:  (98% - 99%)  GENERAL: NAD, well-groomed, well-developed, thin male  EYES: No proptosis, no lid lag, anicteric  HEENT:  Atraumatic, Normocephalic, moist mucous membranes  THYROID: Normal size, no palpable nodules  RESPIRATORY: Clear to auscultation bilaterally; No rales, rhonchi, wheezing, or rubs  CARDIOVASCULAR: Regular rate and rhythm; No murmurs; no peripheral edema  GI: Soft, nontender, non distended, normal bowel sounds  SKIN: Dry, intact, No rashes or lesions, no AN  MUSCULOSKELETAL: Full range of motion, normal strength  NEURO: sensation intact, extraocular movements intact, no tremor, normal reflexes  PSYCH: Alert and oriented x 3, normal affect, normal mood  CUSHING'S SIGNS: no striae    CAPILLARY BLOOD GLUCOSE    86 (06-29 @ 11:09) H2  259 (06-29 @ 07:20) H2+H3  233 (06-29 @ 00:54)    158 (06-28 @ 21:53) L8  80 (06-28 @ 18:48)  258 (06-28 @ 11:03) H4+H3  118 (06-28 @ 09:16)  92 (06-28 @ 08:49)  72 (06-28 @ 08:31)  47 (06-28 @ 08:01)  42 (06-28 @ 07:43)  48 (06-28 @ 07:42)    137 (06-27 @ 20:46) L13                            11.1   6.4   )-----------( 149      ( 29 Jun 2017 03:59 )             32.6       06-29    132<L>  |  90<L>  |  36<H>  ----------------------------<  237<H>  4.5   |  28  |  5.21<H>    EGFR if : 12<L>  EGFR if non : 11<L>    Ca    8.2<L>      06-29     Hemoglobin A1C, Whole Blood: 11.5 % <H> [4.0 - 5.6] (06-28-17 @ 07:17)

## 2017-06-29 NOTE — CONSULT NOTE ADULT - ATTENDING COMMENTS
Agree with assessment and plan as above by Dr. Castaneda. Evaluating this 63 y/o M w/ uncontrolled Type 1 DM with nephropathy A1c >11 w/ HTN and HLD (high risk patient w/ high level decision-making). Increase Lantus to 10 units qhs and Humalog to 3 units qac. Discharge on Lantus 10 and Humalog 3. Follow-up at [facility]. Goal glucose 100-200 given brittle diabetes and risk of hypoglycemia. Reviewed all pertinent labs, glucose values, and imaging studies. Modifications made as indicated above.

## 2017-06-29 NOTE — CHART NOTE - NSCHARTNOTEFT_GEN_A_CORE
Patient is a 62y old  Male who presents with a chief complaint of s/p cardiac cath (2017 22:58)    S/P PCI: EDGAR x1 to pLAD via R groin.    Allergies    No Known Allergies    Intolerances        Medications:  epoetin mayelin Injectable 4000 Unit(s) IV Push <User Schedule>  clopidogrel Tablet 600 milliGRAM(s) Oral once  aspirin  chewable 81 milliGRAM(s) Oral daily  losartan 50 milliGRAM(s) Oral daily  atorvastatin 40 milliGRAM(s) Oral at bedtime  clopidogrel Tablet 75 milliGRAM(s) Oral daily  carvedilol 6.25 milliGRAM(s) Oral every 12 hours  brimonidine 0.2% Ophthalmic Solution 1 Drop(s) Both EYES two times a day  calcium acetate 667 milliGRAM(s) Oral three times a day with meals  pantoprazole    Tablet 40 milliGRAM(s) Oral before breakfast  hydrALAZINE 50 milliGRAM(s) Oral three times a day  multivitamin 1 Tablet(s) Oral daily  cholecalciferol 2000 Unit(s) Oral daily  insulin glargine Injectable (LANTUS) 8 Unit(s) SubCutaneous at bedtime  insulin lispro Injectable (HumaLOG) 2 Unit(s) SubCutaneous before breakfast  insulin lispro Injectable (HumaLOG) 2 Unit(s) SubCutaneous before lunch  insulin lispro Injectable (HumaLOG) 2 Unit(s) SubCutaneous before dinner  insulin lispro (HumaLOG) corrective regimen sliding scale   SubCutaneous three times a day before meals  insulin lispro (HumaLOG) corrective regimen sliding scale   SubCutaneous at bedtime  dextrose 5%. 1000 milliLiter(s) IV Continuous <Continuous>  dextrose Gel 1 Dose(s) Oral once PRN  dextrose 50% Injectable 12.5 Gram(s) IV Push once  dextrose 50% Injectable 25 Gram(s) IV Push once  dextrose 50% Injectable 25 Gram(s) IV Push once  glucagon  Injectable 1 milliGRAM(s) IntraMuscular once PRN      Vitals:  T(C): 36.6 (17 @ 00:54), Max: 36.8 (17 @ 20:50)  HR: 85 (17 @ 00:54) (68 - 90)  BP: 131/55 (17 @ 00:54) (105/49 - 131/55)  BP(mean): --  RR: 17 (17 @ 00:54) (17 - 18)  SpO2: 98% (17 @ 00:54) (98% - 100%)  Wt(kg): --  Daily     Daily Weight in k (2017 22:58)  I&O's Summary    2017 07:01  -  2017 07:00  --------------------------------------------------------  IN: 390 mL / OUT: 0 mL / NET: 390 mL        Physical Exam:  Appearance: Pt in NAD, non-toxic  Cardiovascular: S1 S2  Procedural Access Site: No bleeding, No hematoma, Non-tender to palpation, 2+ pulse  Respiratory: Clear to auscultation bilaterally  Gastrointestinal: Soft, NT/ND, Bowel Sounds +                          11.1   6.4   )-----------( 149      ( 2017 03:59 )             32.6         132<L>  |  90<L>  |  36<H>  ----------------------------<  237<H>  4.5   |  28  |  5.21<H>    Ca    8.2<L>      2017 03:59      Lipid panel   Hgb A1c     ECG: SR at HR 85, no significant changes from previous EKG.    Interpretation of Telemetry: SR at HR     Cath: S/P PCI: EDGAR x1 to pLAD via R groin. Pt tolerated procedure well and overnight remained uneventful. No c/o chest pain or SOB. Pt is hemodynamically stable, EKG and all lab results reviewed. Insertion/incision site benign, no bleeding or hematoma, and cath site dressing changed. Discharge teaching provided to Pt/caretaker and verbalized understanding the instruction. Pt is stable for discharge home as per attending.   F/U with cardiologist in 1-2 weeks.  Plan to see diabetes educator this morning, then may d/c home if stable.

## 2017-07-05 NOTE — ED ADULT TRIAGE NOTE - PAIN: PRESENCE, MLM
Abbreva is not a recommended treatment for cold sores. Will not be able to provide Rx for this.     Valtrex is.     If she would like a Rx for this, I am happy to prescribe this.   
Patient calling requesting order for Abreva be sent to the residence where she is staying so they can administer the medication to her.  Prescription not on patients active medication list.  Reports she has cold sores present currently and needs an order for PRN use.  Patient stated she has the medication so she doesn't need a new prescription sent to pharmacy, just an order faxed to [facility] ATTN: Kayla.  Fax # [phone].  Please advise.        
See [employer] message.  
denies pain/discomfort

## 2017-07-23 PROCEDURE — 99261: CPT

## 2017-07-23 PROCEDURE — C1769: CPT

## 2017-07-23 PROCEDURE — 83036 HEMOGLOBIN GLYCOSYLATED A1C: CPT

## 2017-07-23 PROCEDURE — 93005 ELECTROCARDIOGRAM TRACING: CPT

## 2017-07-23 PROCEDURE — 85027 COMPLETE CBC AUTOMATED: CPT

## 2017-07-23 PROCEDURE — C1894: CPT

## 2017-07-23 PROCEDURE — C1874: CPT

## 2017-07-23 PROCEDURE — C9600: CPT | Mod: LD

## 2017-07-23 PROCEDURE — C1725: CPT

## 2017-07-23 PROCEDURE — 80048 BASIC METABOLIC PNL TOTAL CA: CPT

## 2017-07-23 PROCEDURE — 93460 R&L HRT ART/VENTRICLE ANGIO: CPT

## 2017-07-23 PROCEDURE — C1887: CPT

## 2017-08-01 PROCEDURE — G9001: CPT

## 2017-11-26 ENCOUNTER — EMERGENCY (EMERGENCY)
Facility: HOSPITAL | Age: 62
LOS: 0 days | Discharge: HOME | End: 2017-11-26
Attending: EMERGENCY MEDICINE
Payer: MEDICARE

## 2017-11-26 VITALS
OXYGEN SATURATION: 98 % | HEART RATE: 63 BPM | DIASTOLIC BLOOD PRESSURE: 68 MMHG | SYSTOLIC BLOOD PRESSURE: 140 MMHG | RESPIRATION RATE: 18 BRPM

## 2017-11-26 VITALS
WEIGHT: 147.93 LBS | HEIGHT: 68 IN | RESPIRATION RATE: 16 BRPM | SYSTOLIC BLOOD PRESSURE: 136 MMHG | TEMPERATURE: 99 F | HEART RATE: 65 BPM | OXYGEN SATURATION: 100 % | DIASTOLIC BLOOD PRESSURE: 67 MMHG

## 2017-11-26 DIAGNOSIS — Z98.890 OTHER SPECIFIED POSTPROCEDURAL STATES: Chronic | ICD-10-CM

## 2017-11-26 DIAGNOSIS — S90.422A BLISTER (NONTHERMAL), LEFT GREAT TOE, INITIAL ENCOUNTER: ICD-10-CM

## 2017-11-26 DIAGNOSIS — H40.9 UNSPECIFIED GLAUCOMA: ICD-10-CM

## 2017-11-26 DIAGNOSIS — Z79.2 LONG TERM (CURRENT) USE OF ANTIBIOTICS: ICD-10-CM

## 2017-11-26 DIAGNOSIS — Z99.2 DEPENDENCE ON RENAL DIALYSIS: ICD-10-CM

## 2017-11-26 DIAGNOSIS — I10 ESSENTIAL (PRIMARY) HYPERTENSION: ICD-10-CM
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
## 2017-11-26 DIAGNOSIS — I77.0 ARTERIOVENOUS FISTULA, ACQUIRED: Chronic | ICD-10-CM

## 2017-11-26 DIAGNOSIS — N18.6 END STAGE RENAL DISEASE: ICD-10-CM

## 2017-11-26 DIAGNOSIS — E11.9 TYPE 2 DIABETES MELLITUS WITHOUT COMPLICATIONS: ICD-10-CM

## 2017-11-26 DIAGNOSIS — N17.9 ACUTE KIDNEY FAILURE, UNSPECIFIED: ICD-10-CM

## 2017-11-26 DIAGNOSIS — Z95.9 PRESENCE OF CARDIAC AND VASCULAR IMPLANT AND GRAFT, UNSPECIFIED: Chronic | ICD-10-CM

## 2017-11-26 DIAGNOSIS — M79.675 PAIN IN LEFT TOE(S): ICD-10-CM

## 2017-11-26 DIAGNOSIS — L98.9 DISORDER OF THE SKIN AND SUBCUTANEOUS TISSUE, UNSPECIFIED: ICD-10-CM

## 2017-11-26 DIAGNOSIS — T82.7XXA INFECTION AND INFLAMMATORY REACTION DUE TO OTHER CARDIAC AND VASCULAR DEVICES, IMPLANTS AND GRAFTS, INITIAL ENCOUNTER: Chronic | ICD-10-CM
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
## 2017-11-26 DIAGNOSIS — Z79.82 LONG TERM (CURRENT) USE OF ASPIRIN: ICD-10-CM

## 2017-11-26 LAB
ALBUMIN SERPL ELPH-MCNC: 3.4 G/DL — SIGNIFICANT CHANGE UP (ref 3.3–5)
ALP SERPL-CCNC: 152 U/L — HIGH (ref 40–120)
ALT FLD-CCNC: 61 U/L — SIGNIFICANT CHANGE UP (ref 12–78)
ANION GAP SERPL CALC-SCNC: 7 MMOL/L — SIGNIFICANT CHANGE UP (ref 5–17)
AST SERPL-CCNC: 69 U/L — HIGH (ref 15–37)
BASOPHILS # BLD AUTO: 0 K/UL — SIGNIFICANT CHANGE UP (ref 0–0.2)
BASOPHILS NFR BLD AUTO: 0.6 % — SIGNIFICANT CHANGE UP (ref 0–2)
BILIRUB SERPL-MCNC: 0.6 MG/DL — SIGNIFICANT CHANGE UP (ref 0.2–1.2)
BUN SERPL-MCNC: 52 MG/DL — HIGH (ref 7–23)
CALCIUM SERPL-MCNC: 8 MG/DL — LOW (ref 8.5–10.1)
CHLORIDE SERPL-SCNC: 93 MMOL/L — LOW (ref 96–108)
CO2 SERPL-SCNC: 33 MMOL/L — HIGH (ref 22–31)
CREAT SERPL-MCNC: 5.67 MG/DL — HIGH (ref 0.5–1.3)
CRP SERPL-MCNC: 0.3 MG/DL — SIGNIFICANT CHANGE UP (ref 0–0.4)
EOSINOPHIL # BLD AUTO: 1 K/UL — HIGH (ref 0–0.5)
EOSINOPHIL NFR BLD AUTO: 17.7 % — HIGH (ref 0–6)
ERYTHROCYTE [SEDIMENTATION RATE] IN BLOOD: 7 MM/HR — SIGNIFICANT CHANGE UP (ref 0–20)
GLUCOSE SERPL-MCNC: 114 MG/DL — HIGH (ref 70–99)
HCT VFR BLD CALC: 37.3 % — LOW (ref 39–50)
HGB BLD-MCNC: 12.2 G/DL — LOW (ref 13–17)
LACTATE SERPL-SCNC: 1.2 MMOL/L — SIGNIFICANT CHANGE UP (ref 0.7–2)
LYMPHOCYTES # BLD AUTO: 1 K/UL — SIGNIFICANT CHANGE UP (ref 1–3.3)
LYMPHOCYTES # BLD AUTO: 18.6 % — SIGNIFICANT CHANGE UP (ref 13–44)
MCHC RBC-ENTMCNC: 32.5 PG — SIGNIFICANT CHANGE UP (ref 27–34)
MCHC RBC-ENTMCNC: 32.7 GM/DL — SIGNIFICANT CHANGE UP (ref 32–36)
MCV RBC AUTO: 99.3 FL — SIGNIFICANT CHANGE UP (ref 80–100)
MONOCYTES # BLD AUTO: 0.4 K/UL — SIGNIFICANT CHANGE UP (ref 0–0.9)
MONOCYTES NFR BLD AUTO: 7.4 % — SIGNIFICANT CHANGE UP (ref 2–14)
NEUTROPHILS # BLD AUTO: 3.1 K/UL — SIGNIFICANT CHANGE UP (ref 1.8–7.4)
NEUTROPHILS NFR BLD AUTO: 55.7 % — SIGNIFICANT CHANGE UP (ref 43–77)
PLATELET # BLD AUTO: 139 K/UL — LOW (ref 150–400)
POTASSIUM SERPL-MCNC: 4.8 MMOL/L — SIGNIFICANT CHANGE UP (ref 3.5–5.3)
POTASSIUM SERPL-SCNC: 4.8 MMOL/L — SIGNIFICANT CHANGE UP (ref 3.5–5.3)
PROT SERPL-MCNC: 6.9 GM/DL — SIGNIFICANT CHANGE UP (ref 6–8.3)
RBC # BLD: 3.75 M/UL — LOW (ref 4.2–5.8)
RBC # FLD: 13.9 % — SIGNIFICANT CHANGE UP (ref 11–15)
SODIUM SERPL-SCNC: 133 MMOL/L — LOW (ref 135–145)
WBC # BLD: 5.5 K/UL — SIGNIFICANT CHANGE UP (ref 3.8–10.5)
WBC # FLD AUTO: 5.5 K/UL — SIGNIFICANT CHANGE UP (ref 3.8–10.5)

## 2017-11-26 PROCEDURE — 99284 EMERGENCY DEPT VISIT MOD MDM: CPT

## 2017-11-26 PROCEDURE — 73660 X-RAY EXAM OF TOE(S): CPT | Mod: 26,LT

## 2017-11-26 RX ORDER — ATORVASTATIN CALCIUM 80 MG/1
1 TABLET, FILM COATED ORAL
Qty: 0 | Refills: 0 | COMMUNITY

## 2017-11-26 RX ORDER — LOSARTAN POTASSIUM 100 MG/1
1 TABLET, FILM COATED ORAL
Qty: 0 | Refills: 0 | COMMUNITY

## 2017-11-26 RX ORDER — ASPIRIN/CALCIUM CARB/MAGNESIUM 324 MG
1 TABLET ORAL
Qty: 0 | Refills: 0 | COMMUNITY

## 2017-11-26 RX ORDER — HYDRALAZINE HCL 50 MG
0 TABLET ORAL
Qty: 0 | Refills: 0 | COMMUNITY

## 2017-11-26 RX ORDER — LINAGLIPTIN 5 MG/1
1 TABLET, FILM COATED ORAL
Qty: 0 | Refills: 0 | COMMUNITY

## 2017-11-26 RX ORDER — CLOPIDOGREL BISULFATE 75 MG/1
1 TABLET, FILM COATED ORAL
Qty: 0 | Refills: 0 | COMMUNITY

## 2017-11-26 RX ORDER — LABETALOL HCL 100 MG
1 TABLET ORAL
Qty: 0 | Refills: 0 | COMMUNITY

## 2017-11-26 RX ORDER — AMOXICILLIN 250 MG/5ML
1 SUSPENSION, RECONSTITUTED, ORAL (ML) ORAL
Qty: 0 | Refills: 0 | COMMUNITY

## 2017-11-26 RX ORDER — CALCIUM ACETATE 667 MG
0 TABLET ORAL
Qty: 0 | Refills: 0 | COMMUNITY

## 2017-11-26 RX ORDER — CARVEDILOL PHOSPHATE 80 MG/1
1 CAPSULE, EXTENDED RELEASE ORAL
Qty: 0 | Refills: 0 | COMMUNITY

## 2017-11-26 RX ORDER — PANTOPRAZOLE SODIUM 20 MG/1
1 TABLET, DELAYED RELEASE ORAL
Qty: 0 | Refills: 0 | COMMUNITY

## 2017-11-26 NOTE — CONSULT NOTE ADULT - ASSESSMENT
61 y/o male pt with left hallux wound to subQ  - pt seen and evaluated  - loose eschar removed to evaluated wound   - no signs of infection present, no cellulitis present, dry sterile dressing applied  - explained to pt and family the risk of non healing of the wound due to poor circulation and the risks of reinfection ans well as the need to return to ED if pt experiences any f/n/v/c or if any cellulitis develops   - pt and family expressed verbal understanding  - continue Augmentin 875 BID  - continue with daily dressing changes with bacitracin and DSD  - cont to follow up with current pod or follow up with Dr. Ortega (call  to make an appointment)

## 2017-11-26 NOTE — ED PROVIDER NOTE - OBJECTIVE STATEMENT
62 year old male with PMh of DM II , HTN, renal failure noted 2015 ESRD on dialysis presenting to ED due to left great toe lesion, - as per family saw podiatrist 1 week ago concerned that wound turning dark color so came in for evaluation. Denies any fever/chills.

## 2017-11-26 NOTE — ED PROVIDER NOTE - MEDICAL DECISION MAKING DETAILS
great toe wound noted not actively infected, Podiatry evaluated pt in ED otherwise well appearing to dc with continued augmentin and follow up with own podiatrist scheduled on Tuesday.

## 2017-11-26 NOTE — ED PROVIDER NOTE - MUSCULOSKELETAL, MLM
Spine appears normal, range of motion is not limited, left great toe with some mild swelling of great toe area with dark coloration, serous fluid noted below otherwise no erythema

## 2017-11-26 NOTE — ED ADULT NURSE NOTE - OBJECTIVE STATEMENT
per son at bedside pt had a little scab by the left bid toe went to  see the doctor got started on antibiotic( amox- clav [id number]) ,   on arrival pt noted to have an  intact blister to the left big toe with dry scab on top denies any fever   denies any trauma

## 2017-11-26 NOTE — CONSULT NOTE ADULT - SUBJECTIVE AND OBJECTIVE BOX
Patient is a 62y old  Male who presents with a chief complaint of left hallux wound    HPI: Pt states that he developed a blister one week ago in his left hallux. He states that he went to his podiatrist who drained it and put him on Augmentin. His son relates that the pt will follow up with the podiatrist tuesday but he was concerned about the appearance of the wound so he wanted it to be looked at before tuesday since the area around the wound started to become darkened. Pt denies any f/n/v/c/sob.      PAST MEDICAL & SURGICAL HISTORY:  Glaucoma  Renal failure: 2015  Hypertension  Diabetes mellitus  H/O eye surgery: 2016 left eye  AV fistula      MEDICATIONS  (STANDING):    MEDICATIONS  (PRN):      Allergies    No Known Allergies    Intolerances        VITALS:    Vital Signs Last 24 Hrs  T(C): 37.1 (26 Nov 2017 13:06), Max: 37.1 (26 Nov 2017 13:06)  T(F): 98.7 (26 Nov 2017 13:06), Max: 98.7 (26 Nov 2017 13:06)  HR: 63 (26 Nov 2017 16:19) (63 - 65)  BP: 140/68 (26 Nov 2017 16:19) (136/67 - 140/68)  BP(mean): --  RR: 18 (26 Nov 2017 16:19) (16 - 18)  SpO2: 98% (26 Nov 2017 16:19) (98% - 100%)    LABS:                          12.2   5.5   )-----------( 139      ( 26 Nov 2017 15:23 )             37.3       11-26    133<L>  |  93<L>  |  52<H>  ----------------------------<  114<H>  4.8   |  33<H>  |  5.67<H>    Ca    8.0<L>      26 Nov 2017 15:23    TPro  6.9  /  Alb  3.4  /  TBili  0.6  /  DBili  x   /  AST  69<H>  /  ALT  61  /  AlkPhos  152<H>  11-26      CAPILLARY BLOOD GLUCOSE              LOWER EXTREMITY PHYSICAL EXAM:    Vasular: DP 1/4, B/L, PT 0/4 b/l CFT <3 seconds B/L, Temperature gradient WNL, B/L.   Neuro: Epicritic sensation absent to the level of toes B/L  Skin: left hallux wound with overlying loosely adhered eschar  Wound #1:   Location: medial aspect of the left hallux  Size: 1 cm x 0.7 cm   Depth: 0.3 cm to subQ  Wound bed: granular   Drainage: none  Odor: none   Periwound: hyperkeratotic    RADIOLOGY & ADDITIONAL STUDIES:  < from: Xray Toes, Left Foot (11.26.17 @ 15:38) >  EXAM:  TOE(S)   LT                            PROCEDURE DATE:  11/26/2017          INTERPRETATION:  Left great toe pain    Findings.    3 views left great toe were obtained.     radiographs no evidence for an acute  fracture or a radiopaque foreign  body.      The visualized soft tissues are unremarkable.    IMPRESSION:    Left great toe negative for fracture                YOVANY VALLES M.D., ATTENDING RADIOLOGIST  This document has been electronically signed. Nov 26 2017  3:45PM        < end of copied text >

## 2017-11-26 NOTE — ED ADULT NURSE REASSESSMENT NOTE - NS ED NURSE REASSESS COMMENT FT1
pt seen and evalaued by podiatrist at bedside d/c ready in stable condition left Ed ambultory accompanied by family

## 2017-12-20 NOTE — PROVIDER CONTACT NOTE (OTHER) - SITUATION
cookies. · Bake, broil, or steam foods. Don't arana them. · Be physically active. Get at least 30 minutes of exercise on most days of the week. Walking is a good choice. You also may want to do other activities, such as running, swimming, cycling, or playing tennis or team sports. · Stay at a healthy weight or lose weight by making the changes in eating and physical activity listed above. Losing just a small amount of weight, even 5 to 10 pounds, can reduce your risk for having a heart attack or stroke. · Do not smoke. When should you call for help? Watch closely for changes in your health, and be sure to contact your doctor if:  ? · You need help making lifestyle changes. ? · You have questions about your medicine. Where can you learn more? Go to https://[employer]pe[employer]iceweb.[employer]. org and sign in to your [employer] account. Enter G929 in the [employer] box to learn more about \"High Cholesterol: Care Instructions. \"     If you do not have an account, please click on the \"Sign Up Now\" link. Current as of: September 21, 2016  Content Version: 11.4  © 1864-5378 Health[employer], Incorporated. Care instructions adapted under license by [facility] [place] ([facility]). If you have questions about a medical condition or this instruction, always ask your healthcare professional. [address] any warranty or liability for your use of this information. pt c/o shortness of breath

## 2018-01-30 ENCOUNTER — APPOINTMENT (OUTPATIENT)
Dept: CARDIOLOGY | Facility: CLINIC | Age: 63
End: 2018-01-30
Payer: MEDICARE

## 2018-01-30 ENCOUNTER — NON-APPOINTMENT (OUTPATIENT)
Age: 63
End: 2018-01-30

## 2018-01-30 VITALS
WEIGHT: 150 LBS | SYSTOLIC BLOOD PRESSURE: 156 MMHG | DIASTOLIC BLOOD PRESSURE: 82 MMHG | BODY MASS INDEX: 23.49 KG/M2 | OXYGEN SATURATION: 99 % | HEART RATE: 68 BPM

## 2018-01-30 DIAGNOSIS — I50.22 CHRONIC SYSTOLIC (CONGESTIVE) HEART FAILURE: ICD-10-CM

## 2018-01-30 DIAGNOSIS — E78.00 PURE HYPERCHOLESTEROLEMIA, UNSPECIFIED: ICD-10-CM

## 2018-01-30 DIAGNOSIS — I10 ESSENTIAL (PRIMARY) HYPERTENSION: ICD-10-CM

## 2018-01-30 PROCEDURE — 93000 ELECTROCARDIOGRAM COMPLETE: CPT

## 2018-01-30 PROCEDURE — 99215 OFFICE O/P EST HI 40 MIN: CPT

## 2018-02-20 RX ORDER — INSULIN LISPRO 100/ML
3 VIAL (ML) SUBCUTANEOUS
Qty: 0 | Refills: 0 | COMMUNITY

## 2018-02-20 RX ORDER — CALCIUM ACETATE 667 MG
1 TABLET ORAL
Qty: 0 | Refills: 0 | COMMUNITY

## 2018-02-20 RX ORDER — INSULIN GLARGINE 100 [IU]/ML
10 INJECTION, SOLUTION SUBCUTANEOUS
Qty: 0 | Refills: 0 | COMMUNITY

## 2018-02-20 RX ORDER — INSULIN LISPRO 100/ML
5 VIAL (ML) SUBCUTANEOUS
Qty: 0 | Refills: 0 | COMMUNITY

## 2018-02-20 RX ORDER — ENOXAPARIN SODIUM 100 MG/ML
15 INJECTION SUBCUTANEOUS
Qty: 0 | Refills: 0 | COMMUNITY

## 2018-02-20 RX ORDER — CHOLECALCIFEROL (VITAMIN D3) 125 MCG
1 CAPSULE ORAL
Qty: 0 | Refills: 0 | COMMUNITY

## 2018-02-20 RX ORDER — HYDRALAZINE HCL 50 MG
1 TABLET ORAL
Qty: 90 | Refills: 0 | COMMUNITY

## 2018-02-20 RX ORDER — INSULIN LISPRO 100/ML
8 VIAL (ML) SUBCUTANEOUS
Qty: 0 | Refills: 0 | COMMUNITY

## 2018-02-20 RX ORDER — HYDRALAZINE HCL 50 MG
1 TABLET ORAL
Qty: 0 | Refills: 0 | COMMUNITY

## 2018-02-20 RX ORDER — INSULIN GLARGINE 100 [IU]/ML
15 INJECTION, SOLUTION SUBCUTANEOUS
Qty: 0 | Refills: 0 | COMMUNITY

## 2018-02-20 RX ORDER — LABETALOL HCL 100 MG
1 TABLET ORAL
Qty: 0 | Refills: 0 | COMMUNITY

## 2018-02-20 RX ORDER — ASPIRIN/CALCIUM CARB/MAGNESIUM 324 MG
1 TABLET ORAL
Qty: 0 | Refills: 0 | COMMUNITY

## 2018-02-20 RX ORDER — SIMVASTATIN 20 MG/1
1 TABLET, FILM COATED ORAL
Qty: 0 | Refills: 0 | COMMUNITY

## 2018-02-20 RX ORDER — BRIMONIDINE TARTRATE 2 MG/MG
1 SOLUTION/ DROPS OPHTHALMIC
Qty: 0 | Refills: 0 | COMMUNITY

## 2018-12-20 NOTE — ED ADULT NURSE NOTE - [facility] ED SYMPTOMS POS
Physical Therapy Daily Treatment    Visit Count: 7    Plan of Care: 12/3/2018 Through: 1/28/2019  Insurance Information: [employer]  20 max PT visits for 2018  (visit counts will start over for 2019 on 1/1/19)  Referred by: Joel Booker MD; Next provider visit (if known/scheduled): 1/3/19  Medical Diagnosis (from order): Status post total right knee replacement [Z96.651]  Date of Surgery: 11/15/2018 Surgery Performed: RIGHT TOTAL KNEE ARTHROPLASTY CEMENTED - Right  Physician Guidelines/Precautions: WBAT   Chart reviewed at time of initial evaluation (relevant co-morbidities, allergies, tests and medications listed): overweight, herpes simplex, DJD.    SUBJECTIVE -  Peter in waiting room   Patient reports trying moist heat a couple times on calf and thigh which is helping with pain. Does note discomfort in lower calf starting yesterday that feels more like muscle tightness. Denies fever, nausea or lightheadedness. Has been wearing compression stockings and icing the knee itself. Did start to walk without crutches in the house the last few days a few feet.     Current Pain (0-10 scale): 0/10 \"feels swollen ache\"  Functional Change: improved motion, less swelling, improved ease with ADLs, walking without crutches sometimes for short distances, has been doing recumbent bike at home 2x/day for 5 minutes     OBJECTIVE   Posture/Observation:  Dry, clean incision with all steristrips missing     Gait Analysis:  Ambulate with single crutch- improved TKE RLE      Palpation: Negative sd's, normal warmth, no increased errythema of vein distension. Hypertonic distal soleus with slight tenderness.     Right knee PROM: flexion 102°*, extension - 5 °    Range of Motion (degrees)    Left Right Right Right    Date Initial Initial 12/5/18 12/17/18   Knee Flexion (135) WFL 81*  12/17/18: 102 A   Knee Extension (0-5) WFL -15 Pre-manual -15   Post manual: -10 resting, 8 quad set in supine  12/20/18:   -7 AROM  longsitting  -10 resting   Knee Extension Lag in sitting   approx 10 °      standard testing positions unless otherwise noted; Key: ranges are reported in active range of motion unless noted as AA=active assistive or P=passive range of motion, (norms), * denotes pain   All motions within functional/normal limits except as noted    Treatment     Therapeutic Exercise:   Stationary bike seat 4 level 1- 5 minutes, seat 2, 1 minute - 6 minutes total   Verbal review to incorporate both knee flexion and extension stretches.   Provided picture of weighted extension hang for improved HEP carryover.   Reminded patient to avoid resting leg in bent and ER position with standing.     Manual Therapy:   Prone with pillow under stomach    R innominate inferior functional mobilization with knee extension isometric    sacral gapping functional mobilization with bilateral hip ER isometric    L SI joint PA functional mobilization with glute squeeze     L hip on axis functional mobilization with resisted hip IR progressing ER ROM, progressed to concentric/eccentric and alternating isotonics    Proximal hamstring functional mobilization with knee flexion isometric progressing extension ROM   Distal hamstring functional mobilization with TKE    Proximal gastrocnemius functional mobilization with resisted ankle PF and TKE   Popliteal fascia functional mobilization with plunger with TKE  Longsitting   Patellar functional mobilization with plunger and quad set   Distal scar functional mobilization with plunger and ankle pumps   Foam roll under proximal tibia PA functional mobilization at distal femur progressing rotation to target posterior knee capsule with quad sets and ankle pumps      Therapeutic Activity:   None this date    Home Program:  * above=instructed home program   Continue post op home program from HHPT (supine HS, SAQ, AP, standing hip exercises), seated and supine heel slides, knee hang with weight, seated hamstring stretch,  long sit calf stretch, prone hang      Writer verbally educated the patient and received verbal consent from the patient on hand placement, positioning of patient, and techniques to be performed today and how they are pertinent to the patient's plan of care.      Suggestions for next session as indicated: progress per plan of care, review home program, bike warm up, progress range of motion - AA, P, A, emphasis on extension ROM (TKE standing), knee and scar mobilizations, gait mechanics, progress leg press (SL/DL)    ASSESSMENT   Patient continues to be limited in knee AROM between sessions needing manual work to address muscle and capsular mobility. Patient demo significant hypertonicity of hamstrings and gastrocnemius this date. Able to achieve -7 deg of active quad set. Continue skilled PT to progress knee AROM, gait and activity tolerance.     Pain after treatment (patient reported, 0-10 scale): 2/10 less calf pain   Result of above outlined education: Verbalizes understanding, Demonstrates understanding and Needs reinforcement    PLAN   Goals: To be obtained by end of this plan of care:  1. Patient will be independent with progressed and modified home exercise program  2. Decrease pain/symptoms to 0-3/10  3. Improve involved strength to 4+/5  4. Improve involved range of motion to 0-120 degrees  through improvements listed above patient will:  5. Be able to ambulate for 10-20 minutes without AD and minimal pain/difficulty  6. Be able to ascend and descend 1 flight of stairs using reciprocal pattern with minimal pain/difficulty  7. Be able to complete independent transfers with minimal pain/difficulty  8. Be able to ambulate safely and timely across the street  9. Be able to bend/squat with minimal pain/difficulty to improve completion of household tasks    THERAPY DAILY BILLING   Insurance: [employer] EXCHANGE 2. N/A    Evaluation Procedures:  No evaluation codes were used on this date of  service    Timed Procedures:  Manual Therapy, 35 minutes  Therapeutic Exercise, 10 minutes    Untimed Procedures:  No untimed codes were used on this date of service    Total Treatment Time: 45 minutes    Referring provider signature on file   PAIN

## 2019-07-02 NOTE — PROVIDER CONTACT NOTE (OTHER) - BACKGROUND
61M PMH HTN, DM2, ESRD on HD (MWF), transferred from  with IWSTEMI, s/p EDGAR to mLAD (90% stenosis), and hyperglycemia to 900s requiring insulin gtt. no

## 2019-10-29 NOTE — CONSULT NOTE ADULT - ASSESSMENT
Wife called today and scheduled him for screening (10yr) colonoscopy with Dr Laura on Feb 11 2020 at [facility]. IV sedation. He takes no prescription medications. Some verbal instructions given, and written instructions will be mailed to his home.   ·	ESRD on HD  ·	Dyspnoea, ? Fluid overload. Elevated BNP  ·	Diabetes  ·	Hypertension    Will arrange for emergent dialysis. message left with answering service. Awaiting call back. 2 k bath, To continue HD TIW as scheduled. Fluid removal as tolerated by BP. Monitor blood sugar levels. Coverage as needed.   Dietary restriction. Monitor BP trend. Titrate BP meds as needed. Salt restriction. Dietary and PO fluid restriction. Epogen as needed for anemia. Pt advised on compliance with fluid restriction. Further recommendations pending clinical course. Thank you for the courtesy of this referral.

## 2020-11-03 NOTE — DISCHARGE NOTE ADULT - MEDICATION SUMMARY - MEDICATIONS TO STOP TAKING
Detail Level: Simple
I will STOP taking the medications listed below when I get home from the hospital:    simvastatin 20 mg oral tablet  -- 1 tab(s) by mouth once a day (at bedtime)    Tradjenta 5 mg oral tablet  -- 1 tab(s) by mouth once a day

## 2021-01-01 NOTE — PROVIDER CONTACT NOTE (OTHER) - ASSESSMENT
58.6 Denies dizziness or lightheadedness. Patient is sitting on side of bed with wife at bedside, finishing eating breakfast. AAOx4.

## 2021-05-05 NOTE — DISCHARGE NOTE ADULT - PLAN OF CARE
Continue medications You have a heart attack and had a stent placed. Continue Aspirin, plavix, coreg, losartan and lipitor. Follow up with your cardiologist in 1 week. Stable. Continue with coreg and losartan. Continue with your nephrologist as an outpatient. Follow up with your Primary care physician regarding elevated liver function tests. The ultrasound of your liver showed evidence of hepatic congestion. You should follow up with your primary care physician regarding your diabetes. You will need to go on insulin as you HgA1c was 12.2 You were instructed on how to take insulin, because whe nyou came in your diabetes was very poorly controlled and you developed acid in your blood. You should follow up with your primary care physician regarding your diabetes. You will need to go on insulin as you HgA1c was 12.2 You were instructed on how to take insulin, because when you came in your diabetes was very poorly controlled and you developed acid in your blood. vaccinated/No

## 2021-07-09 NOTE — CONSULT NOTE ADULT - CONSULT REQUESTED BY NAME
Dr. Banuelos
Jamin
Regular rate and rhythm, Heart sounds S1 S2 present. +systolic murmur appreciated

## 2021-10-06 PROBLEM — I10 ESSENTIAL HYPERTENSION: Status: ACTIVE | Noted: 2017-01-31

## 2023-07-07 NOTE — PROVIDER CONTACT NOTE (OTHER) - ASSESSMENT
Problem: At Risk for Falls  Goal: # Patient does not fall  Outcome: Outcome Not Met, Continue to Monitor     Problem: At Risk for Falls  Goal: # Takes action to control fall-related risks  Outcome: Outcome Not Met, Continue to Monitor     Problem: At Risk for Falls  Goal: # Verbalizes understanding of fall risk/precautions  Description: Document education using the patient education activity  Outcome: Outcome Not Met, Continue to Monitor     Problem: Self Care Deficit  Goal: # Functional status is maintained or returned to baseline - REHAB ONLY  Outcome: Outcome Not Met, Continue to Monitor     Problem: Self Care Deficit  Goal: Functional status is maintained or returned to baseline  Outcome: Outcome Not Met, Continue to Monitor     Problem: Self Care Deficit  Goal: # Tolerates activity for d/c setting with no clinical problems  Outcome: Outcome Not Met, Continue to Monitor     Problem: Impaired Physical Mobility  Goal: # Bed mobility, ambulation, and ADLs are maintained or returned to baseline during hospitalization  Outcome: Outcome Not Met, Continue to Monitor     Problem: Pain  Goal: #Acceptable pain level achieved/maintained at rest using NRS/Faces  Description: This goal is used for patients who can self-report.  Acceptable means the level is at or below the identified comfort/function goal.  Outcome: Outcome Not Met, Continue to Monitor     Problem: Pain  Goal: # Acceptable pain level achieved/maintained at rest using NRS/Faces without oversedation (opioid naive or PCA/Epidural infusion)  Description: This goal is used if Opioid-naïve or on PCA/Epidural Infusion.  Outcome: Outcome Not Met, Continue to Monitor     Problem: Pain  Goal: # Acceptable pain level achieved/maintained with activity using NRS/Faces  Description: This goal is used for patients who can self-report and are not achieving acceptable pain control during activity.  Outcome: Outcome Not Met, Continue to Monitor     Problem: Pain  Goal: #  Verbalizes understanding of pain management  Description: Documented in Patient Education Activity  Outcome: Outcome Not Met, Continue to Monitor     Problem: Ostomy Management  Goal: Maintains skin integrity around stoma  Outcome: Outcome Not Met, Continue to Monitor     Problem: Ostomy Management  Goal: Demonstrates ability to manage ileostomy  Description: Document on Patient Education Activity  Outcome: Outcome Not Met, Continue to Monitor      patient c/o of feeling hungry at time, dinner was on unit at time of FS 70, patient ate dinner; after dinner

## 2024-01-01 NOTE — PROVIDER CONTACT NOTE (OTHER) - SITUATION
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 Site: Forehead (14 Apr 2024 06:20)  Bilirubin: 7.7 (14 Apr 2024 06:20)  Bilirubin Comment: 44 HOL. WDL per Bili[employer] - threshold for TSB 13.1, phototherapy 16.0 (14 Apr 2024 06:20)  Site: Forehead (13 Apr 2024 13:00)  Bilirubin: 5.9 (13 Apr 2024 13:00)  Bilirubin Comment: @ 27 HOL (13 Apr 2024 13:00)

## 2024-09-03 NOTE — ED ADULT TRIAGE NOTE - NSWEIGHTCALCTOOLDRUG_GEN_A_CORE
Chief Complaint: MGUS   Interval History:    Patient is with Hx prostate cancer, s/p XRT, mild cytopenia is here for further evaluation of MGUS on UPEP/IF last year     He was found to have mild cytopenia.(thrombocytopenia/leucopenia)   Further work up showed 8.3 mg monoclonal free kappa light chains in 24 hour urine but no M protein in SPEP in 8/2022. Other chemistry showed no significant abnormalities.  Bone marrow biopsy done on 10/2022 showed 1-2% plasma cell but no light chain restriction.  There was not B cell/t cell clonal abnormalities. NGS was not performed.      5/4/23: first visit with me. He remains asymptomatic and denies recent weight loss, poor appetite, night sweats, f/f/n/v/d.  3/5/24: here for follow up. No new c/o. Feels well     ROS: negative except in HPI     PMHx: Prostate cancer diagnosed in 6/2000-> s/p XRT  PShx; left inguinal hernia repair, bilateral Bunion surgery  FHx: colon cancer father around 70, DM(2 sisters and one brother)        Allergies and Intolerances:       Allergies:         No Known Allergies: Active        Social History:   Social Substance History:  ·  Social History denies smoking, alcohol and drug use   ·  Smoking Status never smoker    ·  Tobacco Use denies   ·  Alcohol Use denies   ·  Drug Use denies   ·  Additional History     lives alone, retired           Vitals and Measurements:   Vitals: Temp: 36.3  HR: 55  RR: 16  BP: 131/74  SPO2%:   100   Measurements: HT(cm): 174.8  WT(kg): 69.6  BSA: 1.87  BMI:  22.3      Physical Exam:      Constitutional: Well developed, awake/alert/oriented  x3, no distress, alert and cooperative   Eyes: PERRL, EOMI, clear sclera   ENMT: mucous membranes moist, no apparent injury,  no lesions seen   Head/Neck: Neck supple, no apparent injury, thyroid  without mass or tenderness, No JVD, trachea midline, no bruits   Respiratory/Thorax: Patent airways, CTAB, normal  breath sounds with good chest expansion, thorax symmetric 
 MATERNAL FETAL MEDICINE IS FOLLOWING THE PATIENT  FOR   1. History of premature rupture of membranes in previous pregnancy, ([facility])        SUBJECTIVE:   The patient reports no problems, since last visit    CURRENT MEDICATIONS  Current Outpatient Prescriptions   Medication Sig Dispense Refill   • Prenatal-FeCbn-FeAspGl-FA-Omeg (ULTIMATECARE ONE) 27-1 MG capsule Take 1 capsule by mouth daily.       No current facility-administered medications for this visit.        Allergies as of 12/08/2017   • (No Known Allergies)         PHYSICAL EXAMINATION:  VITAL SIGNS:    Visit Vitals  /62   Ht 5' 1\" (1.549 m)   Wt 59.1 kg   LMP 07/27/2017   BMI 24.60 kg/m²       LABORATORY RESULTS SINCE LAST VISIT:      TODAY'S ULTRASOUND SHOWED:   cervical length 3.3 cm    ASSESSMENT AND PLAN: A 19w1d pregnancy being followed by [facility] for   PROBLEM 1.  1. History of premature rupture of membranes in previous pregnancy, ([facility])      PLAN;  1. Follow-up in 2 weeks and continue progesterone  2. Continue to follow-up with Vidal Vincent MD for routine obstetrical care    Total time of today's office visit 10 minutes, greater than 50% spent face to face discussion on  A. Reviewing with the patient the care plan noted above      
  Cardiovascular: Regular, rate and rhythm, no murmurs,  2+ equal pulses of the extremities, normal S 1and S 2   Gastrointestinal: Nondistended, soft, non-tender,  no rebound tenderness or guarding, no masses palpable, no organomegaly, +BS, no bruits   Genitourinary: No Discharge, vesicles or other abnormalities   Musculoskeletal: ROM intact, no joint swelling, normal  strength   Extremities: normal extremities, no cyanosis edema,  contusions or wounds, no clubbing   Neurological: alert and oriented x3, intact senses,  motor, response and reflexes, normal strength   Breast: No masses, tenderness, no discharge or discoloration   Lymphatic: No significant lymphadenopathy   Psychological: Appropriate mood and behavior   Skin: Warm and dry, no lesions, no rashes         Pathology Results:     ·  Results     Surgical Pathology [Oct 17 2022 11:04AM] ([id number])     FINAL DIAGNOSIS   A&B: BONE MARROW, ASPIRATE WITH CLOT AND CORE BIOPSY WITH TOUCH IMPRINT, LEFT   ILIAC CREST:   -- NORMOCELLULAR BONE MARROW FOR AGE (20-30%) WITH A TINY ABNORMAL PLASMA CELL   POPULATION DETECTED BY FLOW CYTOMETRY STUDIES  (0.2% OF TOTAL EVENTS). SEE NOTE.     NOTE: Per electronic medical records, patient's longstanding history of   leukopenia and anemia was noted. His recent labs showed monoclonal free kappa   light chain (8.3 mg/24 hours) by urine electrophoresis.  Serum electrophoresis   showed no monoclonal protein. Kappa/lambda free light chain quantification   ratio showed slight kappa predominance (3.94).     SPECIAL STAINS: Performed on clot section, A1.   Iron: Storage iron is reduced;  no ring sideroblasts identified.     IMMUNOHISTOCHEMISTRY: Performed on core biopsy, B1.   : Highlights scattered plasma cells (1-2% of overall cellularity).   Kappa, lambda RAF/IHC: Demonstrates no definite light chain restriction.      Assessment and Plan:   Assessment:    Patient is with Hx prostate cancer, s/p XRT, is here for further 
evaluation of MGUS on UPEP/IF  Plan:    1. MGUS: monoclonal kappa light chains in urine  - bone marrow biopsy on 10/17/22 showed Plasma cell 1-2 % without light chain restriction.  - Hx monoclonal free kappa light chain (8.3 mg/24 hours) by urine electrophoresis. Serum electrophoresis   showed no monoclonal protein. Kappa/lambda free light chain ratio showed slight kappa predominance (3.94).   - remains asymptomatic  - ca++, Cr, Hgb remains WNL.  - repeat SPEP and UPEP with IF today-> pending  - check b2M, Ig's     2. Cytopenia: chronic Leucopenia and mild thrombocytopenia at least since 2016  - s/o Bone marrow biopsy  - no evidence clonal B/T cell abnormalities  - No organomegaly or palpable LN's on exam.   - partially due to XRT for prostate cancer(?)  - cont observe only     3. Hx Prostate cancer, s/p XRT in 2000     RTC ~ 6 months or earlier if needed,           
 used

## 2024-11-19 NOTE — DISCHARGE NOTE ADULT - WEIGHT IN KG
Caller: Avery Coffman    Relationship: Self    Best call back number: [phone]    What form or medical record are you requesting: WORK    Who is requesting this form or medical record from you: SELF    How would you like to receive the form or medical records (pick-up, mail, fax):       Timeframe paperwork needed: ASAP    Additional notes: PLEASE CALL PT WHEN READY TO            60.2 65.3 68.1